# Patient Record
Sex: FEMALE | Race: BLACK OR AFRICAN AMERICAN | NOT HISPANIC OR LATINO | URBAN - METROPOLITAN AREA
[De-identification: names, ages, dates, MRNs, and addresses within clinical notes are randomized per-mention and may not be internally consistent; named-entity substitution may affect disease eponyms.]

---

## 2017-02-23 ENCOUNTER — EMERGENCY (EMERGENCY)
Facility: HOSPITAL | Age: 47
LOS: 1 days | Discharge: HOME | End: 2017-02-23
Attending: EMERGENCY MEDICINE | Admitting: EMERGENCY MEDICINE
Payer: MEDICAID

## 2017-02-23 VITALS
TEMPERATURE: 98 F | SYSTOLIC BLOOD PRESSURE: 110 MMHG | DIASTOLIC BLOOD PRESSURE: 39 MMHG | RESPIRATION RATE: 16 BRPM | OXYGEN SATURATION: 100 % | HEART RATE: 83 BPM

## 2017-02-23 LAB
ALBUMIN SERPL ELPH-MCNC: 3.9 G/DL — SIGNIFICANT CHANGE UP (ref 3.3–5)
ALP SERPL-CCNC: 47 U/L — SIGNIFICANT CHANGE UP (ref 40–120)
ALT FLD-CCNC: 16 U/L — SIGNIFICANT CHANGE UP (ref 4–33)
AST SERPL-CCNC: 24 U/L — SIGNIFICANT CHANGE UP (ref 4–32)
BASOPHILS # BLD AUTO: 0.03 K/UL — SIGNIFICANT CHANGE UP (ref 0–0.2)
BASOPHILS NFR BLD AUTO: 0.4 % — SIGNIFICANT CHANGE UP (ref 0–2)
BILIRUB SERPL-MCNC: 0.4 MG/DL — SIGNIFICANT CHANGE UP (ref 0.2–1.2)
BUN SERPL-MCNC: 6 MG/DL — LOW (ref 7–23)
CALCIUM SERPL-MCNC: 9.5 MG/DL — SIGNIFICANT CHANGE UP (ref 8.4–10.5)
CHLORIDE SERPL-SCNC: 100 MMOL/L — SIGNIFICANT CHANGE UP (ref 98–107)
CO2 SERPL-SCNC: 23 MMOL/L — SIGNIFICANT CHANGE UP (ref 22–31)
CREAT SERPL-MCNC: 0.82 MG/DL — SIGNIFICANT CHANGE UP (ref 0.5–1.3)
EOSINOPHIL # BLD AUTO: 0.54 K/UL — HIGH (ref 0–0.5)
EOSINOPHIL NFR BLD AUTO: 6.6 % — HIGH (ref 0–6)
GLUCOSE SERPL-MCNC: 95 MG/DL — SIGNIFICANT CHANGE UP (ref 70–99)
HCT VFR BLD CALC: 35.7 % — SIGNIFICANT CHANGE UP (ref 34.5–45)
HGB BLD-MCNC: 11.8 G/DL — SIGNIFICANT CHANGE UP (ref 11.5–15.5)
IMM GRANULOCYTES NFR BLD AUTO: 0.1 % — SIGNIFICANT CHANGE UP (ref 0–1.5)
LYMPHOCYTES # BLD AUTO: 4.17 K/UL — HIGH (ref 1–3.3)
LYMPHOCYTES # BLD AUTO: 50.7 % — HIGH (ref 13–44)
MCHC RBC-ENTMCNC: 30.5 PG — SIGNIFICANT CHANGE UP (ref 27–34)
MCHC RBC-ENTMCNC: 33.1 % — SIGNIFICANT CHANGE UP (ref 32–36)
MCV RBC AUTO: 92.2 FL — SIGNIFICANT CHANGE UP (ref 80–100)
MONOCYTES # BLD AUTO: 0.52 K/UL — SIGNIFICANT CHANGE UP (ref 0–0.9)
MONOCYTES NFR BLD AUTO: 6.3 % — SIGNIFICANT CHANGE UP (ref 2–14)
NEUTROPHILS # BLD AUTO: 2.96 K/UL — SIGNIFICANT CHANGE UP (ref 1.8–7.4)
NEUTROPHILS NFR BLD AUTO: 35.9 % — LOW (ref 43–77)
PLATELET # BLD AUTO: 258 K/UL — SIGNIFICANT CHANGE UP (ref 150–400)
PMV BLD: 10.5 FL — SIGNIFICANT CHANGE UP (ref 7–13)
POTASSIUM SERPL-MCNC: 4.6 MMOL/L — SIGNIFICANT CHANGE UP (ref 3.5–5.3)
POTASSIUM SERPL-SCNC: 4.6 MMOL/L — SIGNIFICANT CHANGE UP (ref 3.5–5.3)
PROT SERPL-MCNC: 7.1 G/DL — SIGNIFICANT CHANGE UP (ref 6–8.3)
RBC # BLD: 3.87 M/UL — SIGNIFICANT CHANGE UP (ref 3.8–5.2)
RBC # FLD: 13.2 % — SIGNIFICANT CHANGE UP (ref 10.3–14.5)
SODIUM SERPL-SCNC: 138 MMOL/L — SIGNIFICANT CHANGE UP (ref 135–145)
WBC # BLD: 8.23 K/UL — SIGNIFICANT CHANGE UP (ref 3.8–10.5)
WBC # FLD AUTO: 8.23 K/UL — SIGNIFICANT CHANGE UP (ref 3.8–10.5)

## 2017-02-23 PROCEDURE — 99284 EMERGENCY DEPT VISIT MOD MDM: CPT | Mod: 25

## 2017-02-23 PROCEDURE — 70450 CT HEAD/BRAIN W/O DYE: CPT | Mod: 26

## 2017-02-23 NOTE — ED ADULT TRIAGE NOTE - CHIEF COMPLAINT QUOTE
pt amb to triage c/o dizziness upon waking yesterday and today, presented to PMD today and was told blood sugar "low" unknown number, on presentation pt has no complaints,  on presentation

## 2017-02-24 LAB
CK MB BLD-MCNC: 1 NG/ML — SIGNIFICANT CHANGE UP (ref 1–4.7)
CK MB BLD-MCNC: 1.05 NG/ML — SIGNIFICANT CHANGE UP (ref 1–4.7)
CK MB BLD-MCNC: SIGNIFICANT CHANGE UP (ref 0–2.5)
CK MB BLD-MCNC: SIGNIFICANT CHANGE UP (ref 0–2.5)
CK SERPL-CCNC: 129 U/L — SIGNIFICANT CHANGE UP (ref 25–170)
CK SERPL-CCNC: 140 U/L — SIGNIFICANT CHANGE UP (ref 25–170)
HBA1C BLD-MCNC: 6.6 % — HIGH (ref 4–5.6)
TROPONIN T SERPL-MCNC: < 0.06 NG/ML — SIGNIFICANT CHANGE UP (ref 0–0.06)

## 2017-02-24 PROCEDURE — 93306 TTE W/DOPPLER COMPLETE: CPT | Mod: 26

## 2017-02-24 PROCEDURE — 70547 MR ANGIOGRAPHY NECK W/O DYE: CPT | Mod: 26

## 2017-02-24 PROCEDURE — 70551 MRI BRAIN STEM W/O DYE: CPT | Mod: 26

## 2017-02-24 PROCEDURE — 99285 EMERGENCY DEPT VISIT HI MDM: CPT | Mod: GC

## 2017-02-24 RX ORDER — ASPIRIN/CALCIUM CARB/MAGNESIUM 324 MG
324 TABLET ORAL ONCE
Qty: 0 | Refills: 0 | Status: COMPLETED | OUTPATIENT
Start: 2017-02-24 | End: 2017-02-24

## 2017-02-24 RX ORDER — LANOLIN ALCOHOL/MO/W.PET/CERES
3 CREAM (GRAM) TOPICAL ONCE
Qty: 0 | Refills: 0 | Status: COMPLETED | OUTPATIENT
Start: 2017-02-24 | End: 2017-02-24

## 2017-02-24 RX ORDER — ATORVASTATIN CALCIUM 80 MG/1
20 TABLET, FILM COATED ORAL AT BEDTIME
Qty: 0 | Refills: 0 | Status: DISCONTINUED | OUTPATIENT
Start: 2017-02-24 | End: 2017-02-27

## 2017-02-24 RX ORDER — SODIUM CHLORIDE 9 MG/ML
1000 INJECTION INTRAMUSCULAR; INTRAVENOUS; SUBCUTANEOUS ONCE
Qty: 0 | Refills: 0 | Status: COMPLETED | OUTPATIENT
Start: 2017-02-24 | End: 2017-02-24

## 2017-02-24 RX ORDER — METFORMIN HYDROCHLORIDE 850 MG/1
500 TABLET ORAL DAILY
Qty: 0 | Refills: 0 | Status: DISCONTINUED | OUTPATIENT
Start: 2017-02-24 | End: 2017-02-27

## 2017-02-24 RX ORDER — ASPIRIN/CALCIUM CARB/MAGNESIUM 324 MG
325 TABLET ORAL DAILY
Qty: 0 | Refills: 0 | Status: DISCONTINUED | OUTPATIENT
Start: 2017-02-24 | End: 2017-02-24

## 2017-02-24 RX ORDER — SODIUM CHLORIDE 9 MG/ML
1000 INJECTION INTRAMUSCULAR; INTRAVENOUS; SUBCUTANEOUS
Qty: 0 | Refills: 0 | Status: DISCONTINUED | OUTPATIENT
Start: 2017-02-24 | End: 2017-02-27

## 2017-02-24 RX ORDER — ACETAMINOPHEN 500 MG
650 TABLET ORAL ONCE
Qty: 0 | Refills: 0 | Status: COMPLETED | OUTPATIENT
Start: 2017-02-24 | End: 2017-02-24

## 2017-02-24 RX ADMIN — SODIUM CHLORIDE 1000 MILLILITER(S): 9 INJECTION INTRAMUSCULAR; INTRAVENOUS; SUBCUTANEOUS at 12:21

## 2017-02-24 RX ADMIN — Medication 3 MILLIGRAM(S): at 03:40

## 2017-02-24 RX ADMIN — Medication 650 MILLIGRAM(S): at 19:26

## 2017-02-24 RX ADMIN — SODIUM CHLORIDE 125 MILLILITER(S): 9 INJECTION INTRAMUSCULAR; INTRAVENOUS; SUBCUTANEOUS at 03:39

## 2017-02-24 RX ADMIN — SODIUM CHLORIDE 125 MILLILITER(S): 9 INJECTION INTRAMUSCULAR; INTRAVENOUS; SUBCUTANEOUS at 10:58

## 2017-02-24 RX ADMIN — Medication 650 MILLIGRAM(S): at 18:56

## 2017-02-24 RX ADMIN — Medication 324 MILLIGRAM(S): at 03:39

## 2017-02-24 RX ADMIN — METFORMIN HYDROCHLORIDE 500 MILLIGRAM(S): 850 TABLET ORAL at 12:21

## 2017-02-24 RX ADMIN — ATORVASTATIN CALCIUM 20 MILLIGRAM(S): 80 TABLET, FILM COATED ORAL at 23:28

## 2017-02-24 NOTE — ED PROVIDER NOTE - OBJECTIVE STATEMENT
pt c/o dizziness earlier today  no associated GI sxs no SOB  has chronic sternal chest pain worse with breathing or movement    Pt denies change in medications or diet  She denies fever abd pain or dysuria  She was seen by her PMD and told her glucose was low (on metformin only) and that her blood pressure was in the 80s  She was given something to eat and told to go to the hospital   Pt also felt left leg was heavy

## 2017-02-24 NOTE — ED CDU PROVIDER NOTE - CARDIAC, MLM
Normal rate, regular rhythm.  Heart sounds S1, S2.  No murmurs, rubs or gallops, no LE edema, mild TTP over sternum.

## 2017-02-24 NOTE — ED PROVIDER NOTE - MEDICAL DECISION MAKING DETAILS
pt with dizziness and reported hypotension  physical w/o orthostasis  no suspicious murmur  no focal finding on neuro   labs unremarkable    EKG  no acute ischemic changes  isolated small R in V3 likely due to lead placement   Because of complaints of leg heaviness  CT performed  negative  Plan place in CDU  serial enzymes  monitor  obtain ECHO

## 2017-02-24 NOTE — ED CDU PROVIDER NOTE - CHPI ED SYMPTOMS NEG
no nausea/no fever/no chills/no diaphoresis/no shortness of breath/no vomiting/no back pain/no syncope

## 2017-02-24 NOTE — ED CDU PROVIDER NOTE - ATTENDING CONTRIBUTION TO CARE
Dr. James Dr. James attending  Pt placed in CDU for monitoring and ECHO  Presented with dizziness and was told by PMD her sugar was low (on only metformin) and hwer BP was in the 80s   Pt was w/o sxs of fever SOB or GI volume loss  She notes chronic sternal pain with upper body movement    In ED had normal BP  no orthostatic changes  benign abd  no significant murmur,  nonfocal neuro unremarkable labs and nonischemic EKG

## 2017-02-24 NOTE — ED CDU PROVIDER NOTE - MEDICAL DECISION MAKING DETAILS
Pt. was evaluated in the ED and sent to CDU for cardiac telemetry monitoring, IV hydration, CE #2 and #3, echo, observation and reassessment.

## 2017-02-24 NOTE — ED CDU PROVIDER NOTE - PROGRESS NOTE DETAILS
CDU HANNAH Ruiz - Patient states she still feels lightheaded slightly when she stands. No other complaints this am. Exam: heart- RRR s1s2 nl Lungs - clear bilaterally  abd - soft NT ND extrem- no edema or cyanosis, Cardiac enzyme #1 trop negative  #2 trop negative. Plan for third cardiac enzyme this am. Echocardiogram to be done today. Continue to monitor on telemetry. CDU HANNAH Zhang - patient seen by neurology team this am. patient still with complaints of dizziness. neurology recommending MRI MRA for further evaluation. Continue to monitor. CDU HANNAH Zhang - Patient states she still feels lightheaded slightly when she stands. No other complaints this am. Exam: heart- RRR s1s2 nl Lungs - clear bilaterally  abd - soft NT ND extrem- no edema or cyanosis, Cardiac enzyme #1 trop negative  #2 trop negative. Plan for third cardiac enzyme this am. Echocardiogram to be done today. Continue to monitor on telemetry. CDU Attending Note: Dr. Dean - Pt states headache on left side of head has improved.  Occipital pain is resolved.  Describes it as heaviness, no numbness, no tingling, no weakness.  On exam, well appearing, stable vitals, no distress, clear lungs, normal cardiac, soft non tender abd, unremarkable neuro exam. Awaiting MRI and echo results. CDU HANNAH Marshall Addendum------  This patient was signed out to me by CDU HANNAH Flynn and CDU attending Dr. Dean on 02/24/17 at 1900 hrs; test results reviewed.  In interim, pt has been resting comfortably; no complaints in interim.  MRI/MRA pending completion.  Pt stable at present; will continue to monitor / reassess. DEBORA Marshall Addendum----  Pt. resting comfortably in interim; no issues to date; will continue to monitor / reassess. CDU HANNAH Arvizu: MRI results reviewed. Neuro attending seen pt at bedside. Pt stable for discharge and to follow up with neuro outpt at . CDU DC note Dahlia: Pt. stable, MR brain with contrast shows no concerning sx, pt. has mild HA and cont. to c/o L thigh "heaviness" which is worse from baseline LLE c/o following CVA. ERYN, EOMI, no photophobia, neck supple, full ROM, NT spine, neg SLR, full active ROM, good distal pulses. Will FU Neuro outpt.

## 2017-02-24 NOTE — ED CDU PROVIDER NOTE - OBJECTIVE STATEMENT
48 y/o F PMH DM (metformin) and HLD c/o orthostatic lightheadedness (resolves within seconds), sternal nonradiating CP and fatigue since yesterday. Pt states the CP is pleuritic and worse with exertion but is always there. Also notes left calf "tightness". Denies fever, chills, SOB, cough, abdominal pain, N/V, diaphoresis, dysuria, numbness/tingling/wekaness of extremities, recent travel, h/o DVT/PE. 48 y/o F PMH DM (metformin) and HLD c/o orthostatic lightheadedness (resolves within seconds), sternal nonradiating CP and fatigue since yesterday. Pt states the CP is pleuritic and worse with exertion but is always there. Also notes left calf "tightness". Denies fever, chills, SOB, cough, abdominal pain, N/V, diaphoresis, dysuria, numbness/tingling/wekaness/swelling of extremities, recent travel, h/o DVT/PE. Pt went to PMD office today (Brenna) who sent her to ED for "low BP and low sugar" per pt.

## 2017-02-24 NOTE — ED PROVIDER NOTE - PHYSICAL EXAMINATION
pt was not orthostatic  lying to standing  systolic went from 109  to  116  pt not tachycardic  central chest pain reproduced with upper body movement

## 2017-02-25 VITALS
RESPIRATION RATE: 14 BRPM | DIASTOLIC BLOOD PRESSURE: 76 MMHG | OXYGEN SATURATION: 100 % | HEART RATE: 97 BPM | TEMPERATURE: 98 F | SYSTOLIC BLOOD PRESSURE: 109 MMHG

## 2017-02-25 PROCEDURE — 99217: CPT

## 2017-02-25 RX ORDER — ACETAMINOPHEN 500 MG
650 TABLET ORAL ONCE
Qty: 0 | Refills: 0 | Status: COMPLETED | OUTPATIENT
Start: 2017-02-25 | End: 2017-02-25

## 2017-02-25 RX ADMIN — METFORMIN HYDROCHLORIDE 500 MILLIGRAM(S): 850 TABLET ORAL at 08:59

## 2017-02-25 RX ADMIN — Medication 650 MILLIGRAM(S): at 12:43

## 2017-03-24 PROCEDURE — 99218: CPT

## 2017-04-17 ENCOUNTER — EMERGENCY (EMERGENCY)
Facility: HOSPITAL | Age: 47
LOS: 1 days | Discharge: HOME | End: 2017-04-17
Attending: EMERGENCY MEDICINE | Admitting: EMERGENCY MEDICINE
Payer: MEDICAID

## 2017-04-17 VITALS
SYSTOLIC BLOOD PRESSURE: 117 MMHG | TEMPERATURE: 98 F | DIASTOLIC BLOOD PRESSURE: 79 MMHG | HEART RATE: 80 BPM | RESPIRATION RATE: 17 BRPM | OXYGEN SATURATION: 99 %

## 2017-04-17 PROCEDURE — 99284 EMERGENCY DEPT VISIT MOD MDM: CPT

## 2017-04-17 PROCEDURE — 93971 EXTREMITY STUDY: CPT | Mod: 26,LT

## 2017-04-17 RX ORDER — GABAPENTIN 400 MG/1
400 CAPSULE ORAL ONCE
Qty: 0 | Refills: 0 | Status: COMPLETED | OUTPATIENT
Start: 2017-04-17 | End: 2017-04-17

## 2017-04-17 RX ORDER — GABAPENTIN 400 MG/1
1 CAPSULE ORAL
Qty: 90 | Refills: 0 | OUTPATIENT
Start: 2017-04-17 | End: 2017-05-17

## 2017-04-17 RX ADMIN — GABAPENTIN 400 MILLIGRAM(S): 400 CAPSULE ORAL at 15:05

## 2017-04-17 NOTE — ED PROVIDER NOTE - PLAN OF CARE
Follow up with your PMD within 48-72 hrs. Show copies of your reports given to you. Take all of your medications as previously prescribed. Take Neurontin 300mg 1 tab 3x/day. You need to have tight control of you diabetes and aim to keep your HGA1C under 7- discuss further with your PMD . Follow up with a Neurologist within the week- referral list given. Worsening, continued or new concerning symptoms return to the emergency department.

## 2017-04-17 NOTE — ED PROVIDER NOTE - LOWER EXTREMITY EXAM, LEFT
SWELLING/TENDERNESS/Slight swelling to left upper leg. No calf tenderness. Pain to plantar surface of big toe.

## 2017-04-17 NOTE — ED PROVIDER NOTE - NS ED MD SCRIBE ATTENDING SCRIBE SECTIONS
HIV/PAST MEDICAL/SURGICAL/SOCIAL HISTORY/PHYSICAL EXAM/REVIEW OF SYSTEMS/VITAL SIGNS( Pullset)/DISPOSITION/HISTORY OF PRESENT ILLNESS

## 2017-04-17 NOTE — ED PROVIDER NOTE - PROGRESS NOTE DETAILS
RAHUL YOUNG TIBERIO- Duplex US negative for acute DVT. Neurontin started. Will DC home with PMD and Neuro follow up RAHUL WILLARD- Duplex US negative for acute DVT. Neurontin started. Will DC home with PMD and Neuro follow up- has a Neurologist she is following Dr. Mg.

## 2017-04-17 NOTE — ED ADULT TRIAGE NOTE - CHIEF COMPLAINT QUOTE
pt c/o of generalized body cramping x 2 weeks worsening today with left thigh swelling and left toe pain. appears comfortable in triage, ambulating with steady gait.

## 2017-04-17 NOTE — ED PROVIDER NOTE - OBJECTIVE STATEMENT
46 y/o F pt with PMHx of HLD, DM (on Metformin) and herniated disc presents to the ED for diffuse body aches, left leg swelling and pain in toes described as burning x2 weeks. Patient is followed by pain management and gets routine epidural injections. States taking medication for pain with no relief of symptoms. No prolonged immobilization. Denies fall. 48 y/o F pt with PMHx of HLD, DM (on Metformin) and herniated disc presents to the ED for diffuse body aches, left leg pain and swelling and pain in 1st toe described as burning x2 weeks. Patient is followed by pain management and gets routine epidural injections. States taking medication for pain with no relief of symptoms. No prolonged immobilization. Denies fall.

## 2017-04-17 NOTE — ED PROVIDER NOTE - CARE PLAN
Principal Discharge DX:	Diabetic neuropathy  Instructions for follow-up, activity and diet:	Follow up with your PMD within 48-72 hrs. Show copies of your reports given to you. Take all of your medications as previously prescribed. Take Neurontin 300mg 1 tab 3x/day. You need to have tight control of you diabetes and aim to keep your HGA1C under 7- discuss further with your PMD . Follow up with a Neurologist within the week- referral list given. Worsening, continued or new concerning symptoms return to the emergency department.

## 2017-04-17 NOTE — ED PROVIDER NOTE - MEDICAL DECISION MAKING DETAILS
Patient with chronic history of herniated disc with sciatica. Will obtain US to r/o atypical DVT. Description of toe pain appears very much likely peripheral neuropathy. Will provide Gabapentin. Patient has follow up with orthopedics and pain management.

## 2017-04-17 NOTE — ED PROVIDER NOTE - MUSCULOSKELETAL [+], MLM
diffuse body aches, left leg swelling, pain in toes diffuse body aches, left leg pain and swelling and pain in 1st toe

## 2017-04-19 RX ORDER — GABAPENTIN 400 MG/1
1 CAPSULE ORAL
Qty: 90 | Refills: 0 | OUTPATIENT
Start: 2017-04-19 | End: 2017-05-19

## 2017-10-06 ENCOUNTER — EMERGENCY (EMERGENCY)
Facility: HOSPITAL | Age: 47
LOS: 1 days | Discharge: HOME | End: 2017-10-06
Attending: EMERGENCY MEDICINE | Admitting: EMERGENCY MEDICINE
Payer: MEDICAID

## 2017-10-06 VITALS
RESPIRATION RATE: 18 BRPM | TEMPERATURE: 98 F | OXYGEN SATURATION: 100 % | SYSTOLIC BLOOD PRESSURE: 115 MMHG | HEART RATE: 83 BPM | DIASTOLIC BLOOD PRESSURE: 73 MMHG

## 2017-10-06 PROCEDURE — 99284 EMERGENCY DEPT VISIT MOD MDM: CPT | Mod: 25

## 2017-10-06 NOTE — ED ADULT TRIAGE NOTE - CHIEF COMPLAINT QUOTE
Pt c/o generalize back pain and swelling x 2 weeks that is getting worse. Pt has chronic back pain. Pt states that the left side of her back feels "freezing". Pt denies numbness in  b/l LE. Pt ambulating in triage with steady gait. PMH DM, HDL.

## 2017-10-07 VITALS
HEART RATE: 82 BPM | OXYGEN SATURATION: 100 % | SYSTOLIC BLOOD PRESSURE: 124 MMHG | DIASTOLIC BLOOD PRESSURE: 81 MMHG | RESPIRATION RATE: 16 BRPM

## 2017-10-07 DIAGNOSIS — R22.30 LOCALIZED SWELLING, MASS AND LUMP, UNSPECIFIED UPPER LIMB: Chronic | ICD-10-CM

## 2017-10-07 RX ORDER — DIAZEPAM 5 MG
1 TABLET ORAL
Qty: 6 | Refills: 0 | OUTPATIENT
Start: 2017-10-07

## 2017-10-07 RX ORDER — ACETAMINOPHEN 500 MG
325 TABLET ORAL ONCE
Qty: 0 | Refills: 0 | Status: DISCONTINUED | OUTPATIENT
Start: 2017-10-07 | End: 2017-10-07

## 2017-10-07 RX ORDER — ACETAMINOPHEN 500 MG
975 TABLET ORAL ONCE
Qty: 0 | Refills: 0 | Status: COMPLETED | OUTPATIENT
Start: 2017-10-07 | End: 2017-10-07

## 2017-10-07 RX ORDER — LIDOCAINE 4 G/100G
2 CREAM TOPICAL ONCE
Qty: 0 | Refills: 0 | Status: COMPLETED | OUTPATIENT
Start: 2017-10-07 | End: 2017-10-07

## 2017-10-07 RX ORDER — KETOROLAC TROMETHAMINE 30 MG/ML
30 SYRINGE (ML) INJECTION ONCE
Qty: 0 | Refills: 0 | Status: DISCONTINUED | OUTPATIENT
Start: 2017-10-07 | End: 2017-10-07

## 2017-10-07 RX ADMIN — Medication 30 MILLIGRAM(S): at 03:37

## 2017-10-07 RX ADMIN — LIDOCAINE 2 PATCH: 4 CREAM TOPICAL at 03:37

## 2017-10-07 RX ADMIN — Medication 975 MILLIGRAM(S): at 06:37

## 2017-10-07 RX ADMIN — Medication 30 MILLIGRAM(S): at 04:55

## 2017-10-07 NOTE — ED PROVIDER NOTE - NEUROLOGICAL SENSATION DIMINISHED
left upper extremity/left lower extremity left lower extremity/L3-S1 diminished on left compared to right/left upper extremity

## 2017-10-07 NOTE — ED PROVIDER NOTE - MEDICAL DECISION MAKING DETAILS
48 yo F c PMH of CVA last year and disc herniation presenting with 2 weeks of L arm and L leg pain and numbness, as well as back pain and swelling. VS are normal. Pt has point 48 yo F c PMH of CVA last year and disc herniation presenting with 2 weeks of L arm and L leg pain and numbness, as well as back pain and swelling. VS are normal. Pt has point tenderness diffusely throughout back, no marked C/T/L spinous process tenderness. Will tx with lidocaine patch, valium, and toradol, will reassess. 48 yo F with worsening right back pain.  No trauma, neuro intact.  Plan to pain control, reassess, outpatient spine follow-up.

## 2017-10-07 NOTE — ED PROVIDER NOTE - RESPIRATORY, MLM
Breath sounds clear and equal bilaterally. Breath sounds clear and equal bilaterally. no wheezes or ronchi

## 2017-10-07 NOTE — ED PROVIDER NOTE - MUSCULOSKELETAL MINIMAL EXAM
point TTP diffusely throughout back/motor intact/normal range of motion/neck supple point TTP diffusely throughout back, no marked C/T/L midline spinous process TTP; no ecchymosis, lacerations, or erythema/normal range of motion/neck supple/motor intact

## 2017-10-07 NOTE — ED PROVIDER NOTE - OBJECTIVE STATEMENT
48 yo F c PMH of HLD, DM, CVA in 2016 presenting with 2 week hx of L arm and L leg numbness and pain.  Pt had L arm numbness for 1 week with stroke last year, had no numbness in L arm since then until 2 weeks ago. Pt reports sharp pain in L arm and L leg that occurs at night and when pt wakes up, pt denies trauma. Pt reports L arm and L leg numbness that is constant for 2 weeks. Pt denies falls or trauma. Naproxen does not help alleviate the pain. Pt also reports associated diffuse back swelling and pain for 1 week. PCP today told pt to go to ED.    Meds: atoravastatin, metformin  PCP: Nayeli Ceja  Pharm: [facility] 46 yo F c PMH of HLD, DM, CVA in 2016 presenting with 2 week hx of L arm and L leg numbness and pain.  Pt had L arm numbness for 1 week with stroke last year, had no numbness in L arm since then until 2 weeks ago. Pt reports sharp pain in L arm and L leg that occurs at night and when pt wakes up, pt denies trauma. Pt reports L arm and L leg numbness that is constant for 2 weeks. Pt denies falls or trauma. Naproxen does not help alleviate the pain. Pt also reports associated diffuse back swelling and pain for 1 week. PCP today told pt to go to ED. Pt denies saddle anesthesia or bowel or bladder incontinence.    Meds: atoravastatin, metformin  PCP: Nayeli Ceja  Pharm: [facility] **ATTENDIN46 y/o F with PMH of HLD, DM, chronic back pain presenting with 2 week hx of R back pain/swelling.  Pt has h/o chronic back pain (was previously seeing a spine specialist and receiving monthly injections, but has not seen this doctor in 6 months), but reports that it has been worse in the past 2 weeks, with associated sensation of swelling of her back.  Pt denies falls or trauma.  Pt has been taking naproxen with minimal relief.  She also was prescribed oxycodone for pain, which she does not like to take because it makes her feel tired.  PCP today told pt to go to ED. Pt denies saddle anesthesia or bowel or bladder incontinence, extremity weakness or numbness.    Meds: atoravastatin, metformin  PCP: Nayeli Ceja  Pharm: [facility]

## 2017-10-07 NOTE — ED PROVIDER NOTE - CARE PLAN
Principal Discharge DX:	Pain Principal Discharge DX:	Pain  Instructions for follow-up, activity and diet:	1. You were seen for back pain. Your back pain improved with pain medication, a lidocaine patch, and warm compresses.  2.  the prescription for diazepam, toradol, and lidocaine patch from your pharmacy and take and use as prescribed.  3. Follow up with your primary care doctor within 48 hours. Follow up with a spinal surgeon within 48 hours.  4. Return to the emergency department immediately if you cannot control your urination or stool, if you have numbness or tingling between your thighs, if you cannot walk, if you have persistent or worsening pain, if you faint, or if you have a fever. Principal Discharge DX:	Pain  Instructions for follow-up, activity and diet:	1. You were seen for back pain. Your back pain improved with pain medication, a lidocaine patch, and warm compresses.  2.  the prescription for diazepam, toradol, and lidocaine patch from your pharmacy and take and use as prescribed.  3. Follow up with your primary care doctor within 48 hours. Follow up with a spinal surgeon within 48 hours. Call [phone] to make an appointment with a spine specialist.  4. Return to the emergency department immediately if you cannot control your urination or stool, if you have numbness or tingling between your thighs, if you cannot walk, if you have persistent or worsening pain, if you faint, or if you have a fever. Principal Discharge DX:	Back pain  Instructions for follow-up, activity and diet:	1. You were seen for back pain. Your back pain improved with pain medication, a lidocaine patch, and warm compresses.  2.  the prescription for diazepam, toradol, and lidocaine patch from your pharmacy and take and use as prescribed.  3. Follow up with your primary care doctor within 48 hours. Follow up with a spinal surgeon within 48 hours. Call [phone] to make an appointment with a spine specialist.  4. Return to the emergency department immediately if you cannot control your urination or stool, if you have numbness or tingling between your thighs, if you cannot walk, if you have persistent or worsening pain, if you faint, or if you have a fever.

## 2017-10-07 NOTE — ED PROVIDER NOTE - PMH
CVA (cerebral vascular accident)    Diabetes    Disc disorder  herniated  Hyperlipidemia, unspecified hyperlipidemia type

## 2017-10-07 NOTE — ED PROVIDER NOTE - PLAN OF CARE
1. You were seen for back pain. Your back pain improved with pain medication, a lidocaine patch, and warm compresses.  2.  the prescription for diazepam, toradol, and lidocaine patch from your pharmacy and take and use as prescribed.  3. Follow up with your primary care doctor within 48 hours. Follow up with a spinal surgeon within 48 hours.  4. Return to the emergency department immediately if you cannot control your urination or stool, if you have numbness or tingling between your thighs, if you cannot walk, if you have persistent or worsening pain, if you faint, or if you have a fever. 1. You were seen for back pain. Your back pain improved with pain medication, a lidocaine patch, and warm compresses.  2.  the prescription for diazepam, toradol, and lidocaine patch from your pharmacy and take and use as prescribed.  3. Follow up with your primary care doctor within 48 hours. Follow up with a spinal surgeon within 48 hours. Call [phone] to make an appointment with a spine specialist.  4. Return to the emergency department immediately if you cannot control your urination or stool, if you have numbness or tingling between your thighs, if you cannot walk, if you have persistent or worsening pain, if you faint, or if you have a fever.

## 2017-10-07 NOTE — ED PROVIDER NOTE - NEUROLOGICAL GAIT WEIGHT BEARING
5/5 BUE and BLE: deltoids, triceps, biceps, wrist flexors/extensors, hand  strength, hip flexors, knee flexors/extensors, dorsi/plantarflexors, hallux flexors/extensors normal/5/5 BUE and BLE: deltoids, triceps, biceps, wrist flexors/extensors, hand  strength, hip flexors, knee flexors/extensors, dorsi/plantarflexors, hallux flexors/extensors

## 2017-10-07 NOTE — ED PROVIDER NOTE - ATTENDING CONTRIBUTION TO CARE
48 y/o F with h/o hyperlipidemia, dm, chronic back pain here with 2 weeks of right sided diffuse back pain.  Pt also reports sensation of swelling to her back.  No preceding trauma.  No weakness, numbness, bladder or caitlyn incontinence, saddle anesthesia.  Pt reports h/o chronic back pain, take naproxyn with minimal relief.  Has not seen her spine doctor in 6 months (was previously getting injections for pain).  Well appearing, lying comfortably in stretcher, awake and alert, nontoxic.  VSS.  Lungs cta bl.  Cards nl S1/S2, RRR, no MRG.  Abd soft ntnd.  No pedal edema or calf tenderness.  No midline spinal tenderness.  Diffuse right paraspinal muscular tenderness to palpation from neck to low back.  No focal neuro deficits, strength is full and gait is normal.  Plan to pain control, reassess.

## 2017-10-07 NOTE — ED PROVIDER NOTE - PROGRESS NOTE DETAILS
pt states her back pain has improved s/p diazepam, toradol, lidocaine patch, and warm compress. will d/c with spinal surgery follow up

## 2017-10-07 NOTE — ED PROVIDER NOTE - FAMILY HISTORY
Mother  Still living? Unknown  Family history of breast cancer, Age at diagnosis: Age Unknown  Family history of diabetes mellitus (DM), Age at diagnosis: Age Unknown     Father  Still living? Unknown  Family history of diabetes mellitus (DM), Age at diagnosis: Age Unknown  Family history of hypertension, Age at diagnosis: Age Unknown

## 2017-10-07 NOTE — ED ADULT NURSE NOTE - OBJECTIVE STATEMENT
pt c/o left sided arm and leg pain/numbness, pt has sensation in all extremities and full ROM- pt is a/ox3, ambulatory at baseline, vitally stable in NAD pt has hx of stroke last year with no residual weakness. MD at bedside, awaiting further orders from MD, will continue to monitor, pt safety maintained. pt has no other medical complaints at this time,

## 2017-12-01 ENCOUNTER — OUTPATIENT (OUTPATIENT)
Dept: OUTPATIENT SERVICES | Facility: HOSPITAL | Age: 47
LOS: 1 days | End: 2017-12-01
Payer: MEDICAID

## 2017-12-01 DIAGNOSIS — R22.30 LOCALIZED SWELLING, MASS AND LUMP, UNSPECIFIED UPPER LIMB: Chronic | ICD-10-CM

## 2017-12-01 PROCEDURE — G9001: CPT

## 2017-12-26 ENCOUNTER — EMERGENCY (EMERGENCY)
Facility: HOSPITAL | Age: 47
LOS: 1 days | Discharge: HOME | End: 2017-12-26
Attending: EMERGENCY MEDICINE | Admitting: EMERGENCY MEDICINE
Payer: MEDICAID

## 2017-12-26 VITALS
HEART RATE: 86 BPM | TEMPERATURE: 99 F | DIASTOLIC BLOOD PRESSURE: 86 MMHG | RESPIRATION RATE: 16 BRPM | SYSTOLIC BLOOD PRESSURE: 125 MMHG | OXYGEN SATURATION: 100 %

## 2017-12-26 DIAGNOSIS — R22.30 LOCALIZED SWELLING, MASS AND LUMP, UNSPECIFIED UPPER LIMB: Chronic | ICD-10-CM

## 2017-12-26 LAB
ALBUMIN SERPL ELPH-MCNC: 4.4 G/DL — SIGNIFICANT CHANGE UP (ref 3.3–5)
ALP SERPL-CCNC: 44 U/L — SIGNIFICANT CHANGE UP (ref 40–120)
ALT FLD-CCNC: 20 U/L — SIGNIFICANT CHANGE UP (ref 4–33)
AST SERPL-CCNC: 36 U/L — HIGH (ref 4–32)
BASOPHILS # BLD AUTO: 0.07 K/UL — SIGNIFICANT CHANGE UP (ref 0–0.2)
BASOPHILS NFR BLD AUTO: 1.1 % — SIGNIFICANT CHANGE UP (ref 0–2)
BILIRUB SERPL-MCNC: < 0.2 MG/DL — LOW (ref 0.2–1.2)
BUN SERPL-MCNC: 6 MG/DL — LOW (ref 7–23)
CALCIUM SERPL-MCNC: 9 MG/DL — SIGNIFICANT CHANGE UP (ref 8.4–10.5)
CHLORIDE SERPL-SCNC: 101 MMOL/L — SIGNIFICANT CHANGE UP (ref 98–107)
CK MB BLD-MCNC: 0.8 — SIGNIFICANT CHANGE UP (ref 0–2.5)
CK MB BLD-MCNC: 2.14 NG/ML — SIGNIFICANT CHANGE UP (ref 1–4.7)
CK SERPL-CCNC: 278 U/L — HIGH (ref 25–170)
CO2 SERPL-SCNC: 26 MMOL/L — SIGNIFICANT CHANGE UP (ref 22–31)
CREAT SERPL-MCNC: 0.93 MG/DL — SIGNIFICANT CHANGE UP (ref 0.5–1.3)
EOSINOPHIL # BLD AUTO: 0.63 K/UL — HIGH (ref 0–0.5)
EOSINOPHIL NFR BLD AUTO: 9.9 % — HIGH (ref 0–6)
GLUCOSE SERPL-MCNC: 118 MG/DL — HIGH (ref 70–99)
HBA1C BLD-MCNC: 6.8 % — HIGH (ref 4–5.6)
HCT VFR BLD CALC: 36.1 % — SIGNIFICANT CHANGE UP (ref 34.5–45)
HGB BLD-MCNC: 12.2 G/DL — SIGNIFICANT CHANGE UP (ref 11.5–15.5)
IMM GRANULOCYTES # BLD AUTO: 0.01 # — SIGNIFICANT CHANGE UP
IMM GRANULOCYTES NFR BLD AUTO: 0.2 % — SIGNIFICANT CHANGE UP (ref 0–1.5)
LYMPHOCYTES # BLD AUTO: 3.73 K/UL — HIGH (ref 1–3.3)
LYMPHOCYTES # BLD AUTO: 58.7 % — HIGH (ref 13–44)
MCHC RBC-ENTMCNC: 30.1 PG — SIGNIFICANT CHANGE UP (ref 27–34)
MCHC RBC-ENTMCNC: 33.8 % — SIGNIFICANT CHANGE UP (ref 32–36)
MCV RBC AUTO: 89.1 FL — SIGNIFICANT CHANGE UP (ref 80–100)
MONOCYTES # BLD AUTO: 0.47 K/UL — SIGNIFICANT CHANGE UP (ref 0–0.9)
MONOCYTES NFR BLD AUTO: 7.4 % — SIGNIFICANT CHANGE UP (ref 2–14)
NEUTROPHILS # BLD AUTO: 1.44 K/UL — LOW (ref 1.8–7.4)
NEUTROPHILS NFR BLD AUTO: 22.7 % — LOW (ref 43–77)
NRBC # FLD: 0 — SIGNIFICANT CHANGE UP
NT-PROBNP SERPL-SCNC: 9.6 PG/ML — SIGNIFICANT CHANGE UP
PLATELET # BLD AUTO: 273 K/UL — SIGNIFICANT CHANGE UP (ref 150–400)
PMV BLD: 9.9 FL — SIGNIFICANT CHANGE UP (ref 7–13)
POTASSIUM SERPL-MCNC: 5.1 MMOL/L — SIGNIFICANT CHANGE UP (ref 3.5–5.3)
POTASSIUM SERPL-SCNC: 5.1 MMOL/L — SIGNIFICANT CHANGE UP (ref 3.5–5.3)
PROT SERPL-MCNC: 7.7 G/DL — SIGNIFICANT CHANGE UP (ref 6–8.3)
RBC # BLD: 4.05 M/UL — SIGNIFICANT CHANGE UP (ref 3.8–5.2)
RBC # FLD: 13.5 % — SIGNIFICANT CHANGE UP (ref 10.3–14.5)
SODIUM SERPL-SCNC: 138 MMOL/L — SIGNIFICANT CHANGE UP (ref 135–145)
TROPONIN T SERPL-MCNC: < 0.06 NG/ML — SIGNIFICANT CHANGE UP (ref 0–0.06)
WBC # BLD: 6.35 K/UL — SIGNIFICANT CHANGE UP (ref 3.8–10.5)
WBC # FLD AUTO: 6.35 K/UL — SIGNIFICANT CHANGE UP (ref 3.8–10.5)

## 2017-12-26 PROCEDURE — 93010 ELECTROCARDIOGRAM REPORT: CPT | Mod: 59

## 2017-12-26 PROCEDURE — 93971 EXTREMITY STUDY: CPT | Mod: 26,LT

## 2017-12-26 PROCEDURE — 99219: CPT | Mod: 25

## 2017-12-26 PROCEDURE — 71020: CPT | Mod: 26

## 2017-12-26 RX ORDER — DIPHENHYDRAMINE HCL 50 MG
50 CAPSULE ORAL ONCE
Qty: 0 | Refills: 0 | Status: COMPLETED | OUTPATIENT
Start: 2017-12-26 | End: 2017-12-26

## 2017-12-26 RX ORDER — ATORVASTATIN CALCIUM 80 MG/1
1 TABLET, FILM COATED ORAL
Qty: 0 | Refills: 0 | COMMUNITY

## 2017-12-26 RX ORDER — ATORVASTATIN CALCIUM 80 MG/1
40 TABLET, FILM COATED ORAL AT BEDTIME
Qty: 0 | Refills: 0 | Status: DISCONTINUED | OUTPATIENT
Start: 2017-12-26 | End: 2017-12-30

## 2017-12-26 RX ORDER — OXYCODONE AND ACETAMINOPHEN 5; 325 MG/1; MG/1
1 TABLET ORAL ONCE
Qty: 0 | Refills: 0 | Status: DISCONTINUED | OUTPATIENT
Start: 2017-12-26 | End: 2017-12-26

## 2017-12-26 RX ORDER — ASPIRIN/CALCIUM CARB/MAGNESIUM 324 MG
81 TABLET ORAL DAILY
Qty: 0 | Refills: 0 | Status: DISCONTINUED | OUTPATIENT
Start: 2017-12-27 | End: 2017-12-30

## 2017-12-26 RX ORDER — ASPIRIN/CALCIUM CARB/MAGNESIUM 324 MG
1 TABLET ORAL
Qty: 0 | Refills: 0 | COMMUNITY

## 2017-12-26 RX ORDER — ASPIRIN/CALCIUM CARB/MAGNESIUM 324 MG
325 TABLET ORAL ONCE
Qty: 0 | Refills: 0 | Status: COMPLETED | OUTPATIENT
Start: 2017-12-26 | End: 2017-12-26

## 2017-12-26 RX ORDER — ACETAMINOPHEN 500 MG
0 TABLET ORAL
Qty: 0 | Refills: 0 | COMMUNITY

## 2017-12-26 RX ORDER — KETOROLAC TROMETHAMINE 30 MG/ML
30 SYRINGE (ML) INJECTION ONCE
Qty: 0 | Refills: 0 | Status: DISCONTINUED | OUTPATIENT
Start: 2017-12-26 | End: 2017-12-26

## 2017-12-26 RX ADMIN — Medication 50 MILLIGRAM(S): at 21:04

## 2017-12-26 RX ADMIN — Medication 325 MILLIGRAM(S): at 18:39

## 2017-12-26 RX ADMIN — Medication 30 MILLIGRAM(S): at 21:19

## 2017-12-26 RX ADMIN — Medication 30 MILLIGRAM(S): at 21:04

## 2017-12-26 RX ADMIN — ATORVASTATIN CALCIUM 40 MILLIGRAM(S): 80 TABLET, FILM COATED ORAL at 21:33

## 2017-12-26 NOTE — ED PROVIDER NOTE - MEDICAL DECISION MAKING DETAILS
47F pmhx DM, HLD, here w/ right breast pain & heat x 3 days. On exam pt mildly ttp, slight increased temp noticed in R breast. Plan for EKG, CXR, basic labs, and out-pt mammogram f/u. Abscess vs CA vs cardiac issue.

## 2017-12-26 NOTE — ED PROVIDER NOTE - GASTROINTESTINAL NEGATIVE STATEMENT, MLM
no abdominal pain, no diarrhea, no nausea and no vomiting. no abdominal pain, no bloating, no constipation, no diarrhea, no nausea and no vomiting.

## 2017-12-26 NOTE — ED PROVIDER NOTE - ATTENDING CONTRIBUTION TO CARE
MD Goetz:  patient seen and evaluated with the resident.  I was present for key portions of the History & Physical, and I agree with the Impression & Plan.  MD Goetz:  46 yo F, c/o 2d of "breast pain."  She has no cutaneous or constitutional complaints to suggest infection, nor does she have any physical exam findings concerning for infectious etiology.  However, she has a MHx of CAD, and reports that her AMI that she had 3yrs ago felt just like this.  Furthermore, she describes a 1wk Hx of CAMPBELL.  ECG wnl.  Physical Exam: unremarkable.  Breast exam is unremarkable:  no masses, no erythema, no TTP.  CTA B, RRR.  Impression:  ACS until proven otherwise.  Plan:  , Hermelinda, reassess. If 1st trop negative --> CDU for stress in AM.

## 2017-12-26 NOTE — ED CDU PROVIDER INITIAL DAY NOTE - PMH
CVA (cerebral vascular accident)    Diabetes    Disc disorder  herniated  Hyperlipidemia, unspecified hyperlipidemia type CAD (coronary artery disease)  (Pt states she was told she had an MI in the past; denies hx/o stent or other interventional procedure)  Chronic back pain  (thoracic)  CVA (cerebral vascular accident)    Diabetes    Disc disorder  (herniated discs to back/?neck)  Hyperlipidemia, unspecified hyperlipidemia type

## 2017-12-26 NOTE — ED PROVIDER NOTE - OBJECTIVE STATEMENT
47F pmhx DM, HLD, right shoulder lipoma, here c/o R breast pain that started 3 days ago (Saturday night while sleeping). Pt describes waking up from this 8/10 pain in her R breast Saturday night, classifying it as a burning pain that produced actual heat in her R breast. Pt says she has never had this before however she does suffer from chronic pain in her shoulder and back of unknown origin. Per the pt the pain has been constant since. She denies fevers and discharge from her breast. She also denies headache, n/v/d, urinary symptoms. No history of heart problems and the pain is not related to movement or breathing. 47F pmhx DM, HLD, right shoulder lipoma, here c/o R breast pain that started 3 days ago (Saturday night while sleeping). Pt describes waking up from this 8/10 pain in her R breast Saturday night, classifying it as a burning pain that produced actual heat in her R breast. Pt says she has never had this before however she does suffer from chronic pain in her shoulder and back of unknown origin. Per the pt the pain has been constant since. She denies fevers and discharge from her breast. She also denies headache, n/v/d, urinary symptoms. Her pain is not related to movement or breathing. 47F pmhx DM, HLD, CAD, right shoulder lipoma, here c/o R breast pain that started 3 days ago (Saturday night while sleeping). Pt describes waking up from this 8/10 pain in her R breast Saturday night, classifying it as a burning pain that produced actual heat in her R breast. Pt says she has never had this before however she does suffer from chronic pain in her shoulder and back of unknown origin. Per the pt the pain has been constant since. She denies fevers and discharge from her breast. She also denies headache, n/v/d, urinary symptoms. Her pain is not related to movement or breathing.

## 2017-12-26 NOTE — ED CDU PROVIDER INITIAL DAY NOTE - CARDIOVASCULAR [-], MLM
no syncope/pt reports feeling like left leg has been swollen x 2 days (no pain, no hx/o trauma)/no palpitations

## 2017-12-26 NOTE — ED ADULT TRIAGE NOTE - CHIEF COMPLAINT QUOTE
Pt c/o of four days of right breast pain states pain was worse today and she had back  PMH DM MI no stents or surgery.

## 2017-12-26 NOTE — ED CDU PROVIDER INITIAL DAY NOTE - ATTENDING CONTRIBUTION TO CARE
MD Goetz:  I have personally performed a face to face diagnostic evaluation on this patient with the PA.  I have reviewed the ACP note and agree with the history, exam, and plan of care, except as noted.  History and Exam by me shows a 46 yo F, c/o 2d of "breast pain."  She has no cutaneous or constitutional complaints to suggest infection, nor does she have any physical exam findings concerning for infectious etiology.  However, she has a MHx of CAD, and reports that her AMI that she had 3yrs ago felt just like this.  Furthermore, she describes a 1wk Hx of CAMPBELL.  ECG wnl.  Physical Exam: unremarkable.  Breast exam is unremarkable:  no masses, no erythema, no TTP.  CTA B, RRR.   ECG unremarkable.  1st trop < 0.06. Impression:  ACS until proven otherwise.  Plan:  serial enzymes, CDU for stress in AM.

## 2017-12-26 NOTE — ED CDU PROVIDER INITIAL DAY NOTE - MUSCULOSKELETAL, MLM
Range of motion is not limited, no thoracic abnormality noted on visualization or palpation. Range of motion is not limited, no thoracic abnormality noted on visualization or palpation.  Breast exam bilaterally chaperoned by SIMEON Crane; on my exam, no palpable mass or TTP, no skin changes noted.  Lower extremities are without pitting edema; no gross asymmetry noted.  On calf palpation, no objective TTP; pt states "sometimes" she gets discomfort "on and off".

## 2017-12-26 NOTE — ED CDU PROVIDER INITIAL DAY NOTE - OBJECTIVE STATEMENT
47F pmhx DM, HLD, CAD, right shoulder lipoma, here c/o R breast pain that started 3 days ago (Saturday night while sleeping). Pt describes waking up from this 8/10 pain in her R breast Saturday night, classifying it as a burning pain that produced actual heat in her R breast. Pt says she has never had this before however she does suffer from chronic pain in her shoulder and back of unknown origin. Per the pt the pain has been constant since. She denies fevers and discharge from her breast. She also denies headache, n/v/d, urinary symptoms. Her pain is not related to movement or breathing.    CDU HANNAH Marshall Note------  46 yo female with PMH of CAD, DM, HTN, herniated discs, presenting to the ED with right breast pain.  Pt was evaluated in the ED had indicated that she had similar pain with episode of chest pain due to CAD in the past.  CE #1 was negative; pt was sent to CDU for cardiac telemetry monitoring, CE #2, stress test.   On CDU arrival, pt has no active c/o.  CDU plan discussed with patient who verbalizes agreement with plan. 47F pmhx DM, HLD, CAD, right shoulder lipoma, here c/o R breast pain that started 3 days ago (Saturday night while sleeping). Pt describes waking up from this 8/10 pain in her R breast Saturday night, classifying it as a burning pain that produced actual heat in her R breast. Pt says she has never had this before however she does suffer from chronic pain in her shoulder and back of unknown origin. Per the pt the pain has been constant since. She denies fevers and discharge from her breast. She also denies headache, n/v/d, urinary symptoms. Her pain is not related to movement or breathing.    CDU HANNAH Marshall Note------  46 yo female with PMH of CAD (pt states she was told she had a heart attack; denies having any type of intervention), DM, HTN, herniated discs, chronic back pain, presenting to the ED with right breast pain.  Pt was evaluated in the ED had indicated that she had similar pain with episode of chest pain due to CAD in the past.  CE #1 was negative; pt was sent to CDU for cardiac telemetry monitoring, CE #2, stress test.   On CDU arrival, pt has no active c/o apart from right breast pain; states pain feels "deep"; no c/o SOB/dyspnea / abdominal pain at this time.  Pt states she has also noticed x past 2 days that left leg "feels swollen"; no hx/o trauma or injury; no pain to leg; no active discomfort to left leg at the present time.  Pt. denies any right leg pain / discomfort / swelling / issues.  No hx/o fever/chills, cough, recent illness, N/V, or other c/o.  CDU plan discussed with patient who verbalizes agreement with plan.  Regarding pt's outpatient providers, pt states she does not like the cardiologist she was seeing in the past; states she has not followed up in "awhile" and will not be going back to that cardiologist.  Pt follows with PMD Dr. Briana Ceja.    Regarding home meds, pt states she takes aspirin 81 mg every other day as was advised to her by her PMD; states she stopped taking gabapentin and baclofen for her chronic back pain because she felt that it wasn't helping; hasn't followed back up with PMD regarding this.  Pt. also states she stopped taking metformin ~2 months ago because her fingerstick glucose checks have been averaging 120s - 130's (including postprandial checks) and pt felt metformin was no longer necessary.  Pt states her only home meds at present is aspirin 81 mg every other day, Tylenol prn, and atorvastatin 40 mg at bedtime.

## 2017-12-27 VITALS
HEART RATE: 76 BPM | SYSTOLIC BLOOD PRESSURE: 102 MMHG | RESPIRATION RATE: 16 BRPM | OXYGEN SATURATION: 99 % | DIASTOLIC BLOOD PRESSURE: 70 MMHG | TEMPERATURE: 98 F

## 2017-12-27 LAB
CHOLEST SERPL-MCNC: 238 MG/DL — HIGH (ref 120–199)
CK MB BLD-MCNC: 0.8 — SIGNIFICANT CHANGE UP (ref 0–2.5)
CK MB BLD-MCNC: 1.63 NG/ML — SIGNIFICANT CHANGE UP (ref 1–4.7)
CK SERPL-CCNC: 211 U/L — HIGH (ref 25–170)
HDLC SERPL-MCNC: 30 MG/DL — LOW (ref 45–65)
LIPID PNL WITH DIRECT LDL SERPL: 181 MG/DL — SIGNIFICANT CHANGE UP
TRIGL SERPL-MCNC: 240 MG/DL — HIGH (ref 10–149)
TROPONIN T SERPL-MCNC: < 0.06 NG/ML — SIGNIFICANT CHANGE UP (ref 0–0.06)

## 2017-12-27 PROCEDURE — 78452 HT MUSCLE IMAGE SPECT MULT: CPT | Mod: 26

## 2017-12-27 PROCEDURE — 93016 CV STRESS TEST SUPVJ ONLY: CPT | Mod: GC

## 2017-12-27 PROCEDURE — 93018 CV STRESS TEST I&R ONLY: CPT | Mod: GC

## 2017-12-27 PROCEDURE — 99217: CPT

## 2017-12-27 RX ORDER — ACETAMINOPHEN 500 MG
650 TABLET ORAL ONCE
Qty: 0 | Refills: 0 | Status: COMPLETED | OUTPATIENT
Start: 2017-12-27 | End: 2017-12-27

## 2017-12-27 RX ORDER — OXYCODONE AND ACETAMINOPHEN 5; 325 MG/1; MG/1
1 TABLET ORAL ONCE
Qty: 0 | Refills: 0 | Status: DISCONTINUED | OUTPATIENT
Start: 2017-12-27 | End: 2017-12-27

## 2017-12-27 RX ORDER — FAMOTIDINE 10 MG/ML
20 INJECTION INTRAVENOUS ONCE
Qty: 0 | Refills: 0 | Status: COMPLETED | OUTPATIENT
Start: 2017-12-27 | End: 2017-12-27

## 2017-12-27 RX ADMIN — Medication 650 MILLIGRAM(S): at 13:22

## 2017-12-27 RX ADMIN — OXYCODONE AND ACETAMINOPHEN 1 TABLET(S): 5; 325 TABLET ORAL at 00:35

## 2017-12-27 RX ADMIN — FAMOTIDINE 20 MILLIGRAM(S): 10 INJECTION INTRAVENOUS at 06:49

## 2017-12-27 RX ADMIN — Medication 81 MILLIGRAM(S): at 12:09

## 2017-12-27 RX ADMIN — OXYCODONE AND ACETAMINOPHEN 1 TABLET(S): 5; 325 TABLET ORAL at 01:33

## 2017-12-27 RX ADMIN — Medication 650 MILLIGRAM(S): at 13:52

## 2017-12-27 RX ADMIN — Medication 30 MILLILITER(S): at 06:49

## 2017-12-27 NOTE — ED CDU PROVIDER SUBSEQUENT DAY NOTE - ATTENDING CONTRIBUTION TO CARE
Dr Bloch, ATTENDING MD-  I performed a face to face bedside interview with patient regarding history of present illness, review of symptoms and past medical history. I completed an independent physical exam.  I have discussed patient's plan of care with PA.   Documentation as above in the note.

## 2017-12-27 NOTE — ED CDU PROVIDER DISPOSITION NOTE - ATTENDING CONTRIBUTION TO CARE
Dr Bloch, ATTENDING MD-  I performed a face to face bedside interview with patient regarding history of present illness, review of symptoms and past medical history. I completed an independent physical exam.  I have discussed patient's plan of care with PA.   Documentation as above in the note by me. Dr Bloch

## 2017-12-27 NOTE — ED CDU PROVIDER DISPOSITION NOTE - CLINICAL COURSE
Patient came in with right breast pain  on top of her usual chronic back pain and leg pain, 47F pmhx DM, HLD, CAD, right shoulder lipoma, here c/o R breast pain that started 3 days ago , no trauma  no exercise induced. Pt describes waking up from this 8/10 pain in her R breast Saturday night, classifying it as a burning pain that produced actual heat in her R breast. Pt says she has never had this before however she does suffer from chronic pain in her shoulder and back. The pain has been constant . Work up has been negative for DVT,  neg cardiac enzymes, neg stress test. labs sign for elvated glucose, elevated cholesterol. Patient is often noncompliant with meds by Hx.  Wes for discharge- will f/u PMD 47F pmhx DM, HLD, CAD, right shoulder lipoma, here c/o R breast pain that started 3 days ago , no trauma  no exercise induced. Pt describes waking up from this 8/10 pain in her R breast Saturday night, classifying it as a burning pain that produced actual heat in her R breast. Pt says she has never had this before however she does suffer from chronic pain in her shoulder and back. The pain has been constant . Work up has been negative for DVT,  neg cardiac enzymes, neg stress test. labs sign for elevated glucose, elevated cholesterol. Patient is often noncompliant with meds by Derik Harevy for discharge- will f/u PMD . Consider rheum referral.

## 2017-12-27 NOTE — ED CDU PROVIDER SUBSEQUENT DAY NOTE - HISTORY
47F pmhx DM, HLD, CAD, right shoulder lipoma, here c/o R breast pain that started 3 days ago. Has chronic pain syndromes, supposed hx of MI in past. 47F pmhx DM, HLD, CAD, right shoulder lipoma, here c/o R breast pain that started 3 days ago. Has chronic pain syndromes, supposed hx of MI in past. CVA, noncompliant with meds.

## 2017-12-27 NOTE — ED CDU PROVIDER SUBSEQUENT DAY NOTE - PROGRESS NOTE DETAILS
Pt resting comfortably in interim; no issues or complaints to date.  CE #2 negative; US LLE negative.  Pt. will be signed out to day CDU PA and attending at 0700 hrs. Pts stress test normal- ambulating around ED. pt to follow up with PMD. pt agrees with plan. ready for DC.

## 2017-12-27 NOTE — ED CDU PROVIDER SUBSEQUENT DAY NOTE - PMH
CAD (coronary artery disease)  (Pt states she was told she had an MI in the past; denies hx/o stent or other interventional procedure)  Chronic back pain  (thoracic)  CVA (cerebral vascular accident)    Diabetes    Disc disorder  (herniated discs to back/?neck)  Hyperlipidemia, unspecified hyperlipidemia type

## 2018-01-02 DIAGNOSIS — R69 ILLNESS, UNSPECIFIED: ICD-10-CM

## 2018-06-20 ENCOUNTER — APPOINTMENT (OUTPATIENT)
Dept: ORTHOPEDIC SURGERY | Facility: CLINIC | Age: 48
End: 2018-06-20
Payer: MEDICAID

## 2018-06-20 VITALS
WEIGHT: 172 LBS | HEIGHT: 64 IN | DIASTOLIC BLOOD PRESSURE: 70 MMHG | SYSTOLIC BLOOD PRESSURE: 102 MMHG | BODY MASS INDEX: 29.37 KG/M2 | HEART RATE: 78 BPM

## 2018-06-20 DIAGNOSIS — M54.16 RADICULOPATHY, LUMBAR REGION: ICD-10-CM

## 2018-06-20 PROCEDURE — 72040 X-RAY EXAM NECK SPINE 2-3 VW: CPT

## 2018-06-20 PROCEDURE — 72100 X-RAY EXAM L-S SPINE 2/3 VWS: CPT

## 2018-06-20 PROCEDURE — 99215 OFFICE O/P EST HI 40 MIN: CPT

## 2018-07-02 ENCOUNTER — FORM ENCOUNTER (OUTPATIENT)
Age: 48
End: 2018-07-02

## 2018-07-03 ENCOUNTER — APPOINTMENT (OUTPATIENT)
Dept: MRI IMAGING | Facility: CLINIC | Age: 48
End: 2018-07-03
Payer: MEDICAID

## 2018-07-03 ENCOUNTER — OUTPATIENT (OUTPATIENT)
Dept: OUTPATIENT SERVICES | Facility: HOSPITAL | Age: 48
LOS: 1 days | End: 2018-07-03
Payer: MEDICAID

## 2018-07-03 DIAGNOSIS — R22.30 LOCALIZED SWELLING, MASS AND LUMP, UNSPECIFIED UPPER LIMB: Chronic | ICD-10-CM

## 2018-07-03 DIAGNOSIS — Z00.8 ENCOUNTER FOR OTHER GENERAL EXAMINATION: ICD-10-CM

## 2018-07-03 PROCEDURE — 72148 MRI LUMBAR SPINE W/O DYE: CPT | Mod: 26

## 2018-07-03 PROCEDURE — 72141 MRI NECK SPINE W/O DYE: CPT

## 2018-07-03 PROCEDURE — 72141 MRI NECK SPINE W/O DYE: CPT | Mod: 26

## 2018-07-03 PROCEDURE — 72148 MRI LUMBAR SPINE W/O DYE: CPT

## 2018-07-06 ENCOUNTER — RESULT REVIEW (OUTPATIENT)
Age: 48
End: 2018-07-06

## 2018-07-16 PROBLEM — M51.9 UNSPECIFIED THORACIC, THORACOLUMBAR AND LUMBOSACRAL INTERVERTEBRAL DISC DISORDER: Chronic | Status: ACTIVE | Noted: 2017-04-17

## 2018-07-26 ENCOUNTER — EMERGENCY (EMERGENCY)
Facility: HOSPITAL | Age: 48
LOS: 1 days | Discharge: HOME | End: 2018-07-26
Attending: EMERGENCY MEDICINE | Admitting: EMERGENCY MEDICINE
Payer: MEDICAID

## 2018-07-26 VITALS
OXYGEN SATURATION: 100 % | HEART RATE: 83 BPM | TEMPERATURE: 98 F | RESPIRATION RATE: 16 BRPM | DIASTOLIC BLOOD PRESSURE: 75 MMHG | SYSTOLIC BLOOD PRESSURE: 118 MMHG

## 2018-07-26 DIAGNOSIS — R22.30 LOCALIZED SWELLING, MASS AND LUMP, UNSPECIFIED UPPER LIMB: Chronic | ICD-10-CM

## 2018-07-26 PROCEDURE — 99283 EMERGENCY DEPT VISIT LOW MDM: CPT

## 2018-07-26 RX ORDER — IBUPROFEN 200 MG
600 TABLET ORAL ONCE
Qty: 0 | Refills: 0 | Status: COMPLETED | OUTPATIENT
Start: 2018-07-26 | End: 2018-07-26

## 2018-07-26 RX ORDER — ACETAMINOPHEN 500 MG
650 TABLET ORAL ONCE
Qty: 0 | Refills: 0 | Status: COMPLETED | OUTPATIENT
Start: 2018-07-26 | End: 2018-07-26

## 2018-07-26 RX ADMIN — Medication 650 MILLIGRAM(S): at 21:52

## 2018-07-26 RX ADMIN — Medication 600 MILLIGRAM(S): at 21:52

## 2018-07-26 RX ADMIN — Medication 1 TABLET(S): at 21:52

## 2018-07-26 NOTE — ED PROVIDER NOTE - OBJECTIVE STATEMENT
48y F presents to the ED for tactile fever, headache, body aches, dizziness, and ear pain x3 days. States left ear pain worse than right ear pain. Also has mild cough. No recent travel or sick contact.

## 2018-07-26 NOTE — ED PROVIDER NOTE - ENMT, MLM
Airway patent, Nasal mucosa clear. Mouth with normal mucosa. Throat has no vesicles, no oropharyngeal exudates and uvula is midline. Left TM erythematous and bulging. No discharge. Right TM clear. Dry mucous membranes. Supple neck

## 2018-07-26 NOTE — ED PROVIDER NOTE - MEDICAL DECISION MAKING DETAILS
48y F with few days of left earache, myalgia, headache, and subjective fever. Likely viral syndrome. However given worsening earache will cover for otitis media. Give abx, NSAIDs, tylenol, and dc home with PCP follow up 48y F with few days of left earache, myalgia, headache, and subjective fever. Likely viral syndrome. However given worsening earache and tm changes will cover for otitis media. Give abx, NSAIDs, tylenol, and dc home with PCP follow up

## 2018-07-27 PROBLEM — I63.9 CEREBRAL INFARCTION, UNSPECIFIED: Chronic | Status: ACTIVE | Noted: 2017-10-07

## 2018-07-27 PROBLEM — M54.9 DORSALGIA, UNSPECIFIED: Chronic | Status: ACTIVE | Noted: 2017-12-26

## 2018-07-27 PROBLEM — I25.10 ATHEROSCLEROTIC HEART DISEASE OF NATIVE CORONARY ARTERY WITHOUT ANGINA PECTORIS: Chronic | Status: ACTIVE | Noted: 2017-12-26

## 2018-08-13 ENCOUNTER — APPOINTMENT (OUTPATIENT)
Dept: PHYSICAL MEDICINE AND REHAB | Facility: CLINIC | Age: 48
End: 2018-08-13

## 2018-08-20 ENCOUNTER — EMERGENCY (EMERGENCY)
Facility: HOSPITAL | Age: 48
LOS: 1 days | Discharge: HOME | End: 2018-08-20
Attending: EMERGENCY MEDICINE | Admitting: EMERGENCY MEDICINE
Payer: MEDICAID

## 2018-08-20 VITALS
SYSTOLIC BLOOD PRESSURE: 118 MMHG | HEART RATE: 78 BPM | RESPIRATION RATE: 18 BRPM | DIASTOLIC BLOOD PRESSURE: 97 MMHG | TEMPERATURE: 98 F | OXYGEN SATURATION: 100 %

## 2018-08-20 DIAGNOSIS — R22.30 LOCALIZED SWELLING, MASS AND LUMP, UNSPECIFIED UPPER LIMB: Chronic | ICD-10-CM

## 2018-08-20 PROCEDURE — 99284 EMERGENCY DEPT VISIT MOD MDM: CPT | Mod: 25

## 2018-08-20 PROCEDURE — 93010 ELECTROCARDIOGRAM REPORT: CPT

## 2018-08-20 NOTE — ED ADULT TRIAGE NOTE - CHIEF COMPLAINT QUOTE
Pt sent by PCP for abnormal EKG.  PT co epigastric pain that radiates to back.  Pain worse with lying and deep breathing.  ekg in progress.  PT NAD.

## 2018-08-21 VITALS
RESPIRATION RATE: 17 BRPM | DIASTOLIC BLOOD PRESSURE: 65 MMHG | HEART RATE: 73 BPM | SYSTOLIC BLOOD PRESSURE: 117 MMHG | OXYGEN SATURATION: 99 % | TEMPERATURE: 98 F

## 2018-08-21 LAB
ALBUMIN SERPL ELPH-MCNC: 4.4 G/DL — SIGNIFICANT CHANGE UP (ref 3.3–5)
ALP SERPL-CCNC: 55 U/L — SIGNIFICANT CHANGE UP (ref 40–120)
ALT FLD-CCNC: 17 U/L — SIGNIFICANT CHANGE UP (ref 4–33)
AST SERPL-CCNC: 20 U/L — SIGNIFICANT CHANGE UP (ref 4–32)
BASOPHILS # BLD AUTO: 0.05 K/UL — SIGNIFICANT CHANGE UP (ref 0–0.2)
BASOPHILS NFR BLD AUTO: 0.7 % — SIGNIFICANT CHANGE UP (ref 0–2)
BILIRUB SERPL-MCNC: < 0.2 MG/DL — LOW (ref 0.2–1.2)
BUN SERPL-MCNC: 8 MG/DL — SIGNIFICANT CHANGE UP (ref 7–23)
CALCIUM SERPL-MCNC: 9.2 MG/DL — SIGNIFICANT CHANGE UP (ref 8.4–10.5)
CHLORIDE SERPL-SCNC: 100 MMOL/L — SIGNIFICANT CHANGE UP (ref 98–107)
CHOLEST SERPL-MCNC: 196 MG/DL — SIGNIFICANT CHANGE UP (ref 120–199)
CO2 SERPL-SCNC: 25 MMOL/L — SIGNIFICANT CHANGE UP (ref 22–31)
CREAT SERPL-MCNC: 0.69 MG/DL — SIGNIFICANT CHANGE UP (ref 0.5–1.3)
EOSINOPHIL # BLD AUTO: 0.67 K/UL — HIGH (ref 0–0.5)
EOSINOPHIL NFR BLD AUTO: 9.4 % — HIGH (ref 0–6)
GLUCOSE SERPL-MCNC: 149 MG/DL — HIGH (ref 70–99)
HCG SERPL-ACNC: < 5 MIU/ML — SIGNIFICANT CHANGE UP
HCT VFR BLD CALC: 35 % — SIGNIFICANT CHANGE UP (ref 34.5–45)
HDLC SERPL-MCNC: 31 MG/DL — LOW (ref 45–65)
HGB BLD-MCNC: 10.9 G/DL — LOW (ref 11.5–15.5)
IMM GRANULOCYTES # BLD AUTO: 0.01 # — SIGNIFICANT CHANGE UP
IMM GRANULOCYTES NFR BLD AUTO: 0.1 % — SIGNIFICANT CHANGE UP (ref 0–1.5)
LIDOCAIN IGE QN: 30.2 U/L — SIGNIFICANT CHANGE UP (ref 7–60)
LIPID PNL WITH DIRECT LDL SERPL: 149 MG/DL — SIGNIFICANT CHANGE UP
LYMPHOCYTES # BLD AUTO: 3.8 K/UL — HIGH (ref 1–3.3)
LYMPHOCYTES # BLD AUTO: 53.1 % — HIGH (ref 13–44)
MCHC RBC-ENTMCNC: 27.3 PG — SIGNIFICANT CHANGE UP (ref 27–34)
MCHC RBC-ENTMCNC: 31.1 % — LOW (ref 32–36)
MCV RBC AUTO: 87.5 FL — SIGNIFICANT CHANGE UP (ref 80–100)
MONOCYTES # BLD AUTO: 0.48 K/UL — SIGNIFICANT CHANGE UP (ref 0–0.9)
MONOCYTES NFR BLD AUTO: 6.7 % — SIGNIFICANT CHANGE UP (ref 2–14)
NEUTROPHILS # BLD AUTO: 2.15 K/UL — SIGNIFICANT CHANGE UP (ref 1.8–7.4)
NEUTROPHILS NFR BLD AUTO: 30 % — LOW (ref 43–77)
NRBC # FLD: 0 — SIGNIFICANT CHANGE UP
PLATELET # BLD AUTO: 279 K/UL — SIGNIFICANT CHANGE UP (ref 150–400)
PMV BLD: 10.1 FL — SIGNIFICANT CHANGE UP (ref 7–13)
POTASSIUM SERPL-MCNC: 4.2 MMOL/L — SIGNIFICANT CHANGE UP (ref 3.5–5.3)
POTASSIUM SERPL-SCNC: 4.2 MMOL/L — SIGNIFICANT CHANGE UP (ref 3.5–5.3)
PROT SERPL-MCNC: 7.6 G/DL — SIGNIFICANT CHANGE UP (ref 6–8.3)
RBC # BLD: 4 M/UL — SIGNIFICANT CHANGE UP (ref 3.8–5.2)
RBC # FLD: 14.6 % — HIGH (ref 10.3–14.5)
SODIUM SERPL-SCNC: 137 MMOL/L — SIGNIFICANT CHANGE UP (ref 135–145)
TRIGL SERPL-MCNC: 162 MG/DL — HIGH (ref 10–149)
TROPONIN T, HIGH SENSITIVITY: < 6 NG/L — SIGNIFICANT CHANGE UP (ref ?–14)
TROPONIN T, HIGH SENSITIVITY: < 6 NG/L — SIGNIFICANT CHANGE UP (ref ?–14)
WBC # BLD: 7.16 K/UL — SIGNIFICANT CHANGE UP (ref 3.8–10.5)
WBC # FLD AUTO: 7.16 K/UL — SIGNIFICANT CHANGE UP (ref 3.8–10.5)

## 2018-08-21 PROCEDURE — 76705 ECHO EXAM OF ABDOMEN: CPT | Mod: 26

## 2018-08-21 PROCEDURE — 71046 X-RAY EXAM CHEST 2 VIEWS: CPT | Mod: 26

## 2018-08-21 RX ORDER — FAMOTIDINE 10 MG/ML
20 INJECTION INTRAVENOUS ONCE
Qty: 0 | Refills: 0 | Status: COMPLETED | OUTPATIENT
Start: 2018-08-21 | End: 2018-08-21

## 2018-08-21 RX ORDER — IBUPROFEN 200 MG
400 TABLET ORAL ONCE
Qty: 0 | Refills: 0 | Status: COMPLETED | OUTPATIENT
Start: 2018-08-21 | End: 2018-08-21

## 2018-08-21 RX ORDER — LIDOCAINE 4 G/100G
5 CREAM TOPICAL ONCE
Qty: 0 | Refills: 0 | Status: COMPLETED | OUTPATIENT
Start: 2018-08-21 | End: 2018-08-21

## 2018-08-21 RX ORDER — ACETAMINOPHEN 500 MG
975 TABLET ORAL ONCE
Qty: 0 | Refills: 0 | Status: COMPLETED | OUTPATIENT
Start: 2018-08-21 | End: 2018-08-21

## 2018-08-21 RX ADMIN — Medication 975 MILLIGRAM(S): at 02:04

## 2018-08-21 RX ADMIN — LIDOCAINE 5 MILLILITER(S): 4 CREAM TOPICAL at 02:04

## 2018-08-21 RX ADMIN — Medication 975 MILLIGRAM(S): at 01:01

## 2018-08-21 RX ADMIN — Medication 400 MILLIGRAM(S): at 07:16

## 2018-08-21 RX ADMIN — Medication 30 MILLILITER(S): at 02:04

## 2018-08-21 RX ADMIN — FAMOTIDINE 20 MILLIGRAM(S): 10 INJECTION INTRAVENOUS at 02:04

## 2018-08-21 NOTE — ED PROVIDER NOTE - NS ED ROS FT
CONST: no fevers, no chills, no trauma  EYES: no pain, no visual disturbances  ENT: no sore throat, no epistaxis, no rhinorrhea, no hearing changes  CV: + chest pain, no palpitations, no orthopnea, no extremity pain or swelling  RESP: no shortness of breath, no cough, no sputum, no pleurisy, no wheezing  ABD: no abdominal pain, no nausea, no vomiting, no diarrhea, no black or bloody stool  : no dysuria, no hematuria, no frequency, no urgency  MSK: + back pain, no neck pain, no extremity pain  NEURO: no headache, no sensory disturbances, no focal weakness, no dizziness  HEME: no easy bleeding or bruising  SKIN: no diaphoresis, no rash

## 2018-08-21 NOTE — ED PROVIDER NOTE - CARE PLAN
Principal Discharge DX:	Chest pain Principal Discharge DX:	Chest pain  Secondary Diagnosis:	Gastroesophageal reflux disease without esophagitis

## 2018-08-21 NOTE — ED PROVIDER NOTE - MEDICAL DECISION MAKING DETAILS
47 yo F hx HTN, HLD, DM2, CVA, presenting with CP, ACS r/o, likely admit 47 yo F hx HTN, HLD, DM2, CVA, presenting with CP, ACS r/o, no risk factors for PE, no EKG findings suspicious for pericarditis, no friction rub heard on auscultation, pulses are equal b/l and not hypertensive- patient is stable low risk aortic dissection, no difficulty breathing/sob unlikely tension pneumo or Boorhave's (no retching/emesis), likely CDU vs admit for stress testing 49 yo F hx HTN, HLD, DM2, CVA, presenting with CP, ACS r/o, no risk factors for PE, no EKG findings suspicious for pericarditis, no friction rub heard on auscultation, pulses are equal b/l and not hypertensive- patient is stable low risk aortic dissection, no difficulty breathing/sob unlikely tension pneumo or Boorhave's (no retching/emesis). Will obtain labs and imaging and reassess.

## 2018-08-21 NOTE — ED PROVIDER NOTE - PHYSICAL EXAMINATION
GEN: NAD, A & O x 3  HEAD/EYES: NCAT, PERRL, EOMI, anicteric sclerae, no conjunctival pallor  ENT: mucus membranes moist, oropharynx WNL, trachea midline, no JVD  RESP: lungs CTA with equal breath sounds bilaterally, chest wall nontender and atraumatic  CV: heart with reg rhythm S1, S2, no murmur; distal pulses intact and symmetric bilaterally  ABDOMEN: normoactive bowel sounds, soft, nondistended, nontender, no palpable masses  : no CVAT  MSK: extremities atraumatic, no edema, no asymmetry. back ttp ~T4-T5, there is no spinal deformity or stepoff and the back is ranged painlessly.  SKIN: warm, dry, no rash, no bruising, no cyanosis. color appropriate for ethnicity  NEURO: alert, mentating appropriately, no facial asymmetry. gross sensation, motor, coordination are intact  PSYCH: Affect appropriate

## 2018-08-21 NOTE — ED PROVIDER NOTE - ATTENDING CONTRIBUTION TO CARE
DR. SANTOS, ATTENDING MD-  I performed a face to face bedside interview with patient regarding history of present illness, review of symptoms and past medical history. I completed an independent physical exam.  I have discussed patient's plan of care with the resident.   Documentation as above in the note.   HPI: 49 yo F hx HTN, DM2, HLD, CVA, CAD (no stents/CABG), presenting with chest pain x 4 days of chest pain radiating to back, sharp in nature, worse with lying supine and with deep breath described as "needle". Denies nausea/vomiting. No numbness/tingling. Pain acutely worsened tonight, non exertional. Denies recent URI sx, no congestion/cough. No recent travel/surgeries. No calf pain/swelling. States she did not take aspirin because she cannot take aspirin as it makes her throat feel like it is closing/getting stuck. Hasn't seen a cardiologist in years.   EXAM: NAD, heart RRR, no M/R/G, lungs ctab, abd tenderness to palpation in RUQ, otherwise diffusely non tender, no guarding, no rebound.   MDM: concern for GERD vs ACS. Will obtain labs for both and imaging. Most likely should stay for cardiac stress given history of MI complaint of chest pain and not seeing Cards for years. Pt amenable to plan. DR. SANTOS, ATTENDING MD-  I performed a face to face bedside interview with patient regarding history of present illness, review of symptoms and past medical history. I completed an independent physical exam.  I have discussed patient's plan of care with the resident.   Documentation as above in the note.   HPI: 47 yo F hx HTN, DM2, HLD, CVA, CAD (no stents/CABG), presenting with chest pain x 4 days of chest pain radiating to back, sharp in nature, worse with lying supine and with deep breath described as "needle". Denies nausea/vomiting. No numbness/tingling. Pain acutely worsened tonight, non exertional. Denies recent URI sx, no congestion/cough. No recent travel/surgeries. No calf pain/swelling. States she did not take aspirin because she cannot take aspirin as it makes her throat feel like it is closing/getting stuck. Hasn't seen a cardiologist in years.   EXAM: NAD, heart RRR, no M/R/G, lungs ctab, abd tenderness to palpation in RUQ, otherwise diffusely non tender, no guarding, no rebound.   MDM: concern for GERD vs ACS. Will obtain labs for both and imaging. Normal stress Dec 2017, Normal ECHO within last year.

## 2018-08-21 NOTE — ED PROVIDER NOTE - PROGRESS NOTE DETAILS
[place] PGY2: Pt resting comfortably at bedside, states that her pain is improved since coming to ED. Discussed lab results and ultrasound findings, and recommended patient stay for stress testing. Patient unable to stay as her daughter is leaving for college in the morning. She understands the risks of leaving without further work up, and will follow up with cardiologist or return to ED for any worsening symptoms. Montrell PGY2: pt changed her mind and would like to stay for further work up, will CDU pt for stress testing [place]rell PGY2: Pt resting comfortably at bedside, states that her pain is improved since coming to ED. Discussed lab results and ultrasound findings. Trop neg x 1. Will continue to monitor and reassess. Repeat Trop. TOM: Upon chart review, pt has had a normal Stress test Dec 2017, and normal ECHO within last year. Trop Neg x2. Will discharge to f/u with PMD and cards, most likely not ACS causing symptoms, most likely GERD. Will advise strong return precautions.

## 2018-08-21 NOTE — ED ADULT NURSE NOTE - OBJECTIVE STATEMENT
pt arrives w/ c/o chest pain and back pain x few days. pt states she was seen at her PMD earlier and told she had an abnormal EKG and referred to the ED. pt states also has hx of chronic back pain. IV accessed 22 gauge Left hand labs sent. waiting further orders will continue to monitor.

## 2018-08-21 NOTE — ED PROVIDER NOTE - OBJECTIVE STATEMENT
47 yo F hx HTN, DM2, HLD, CVA, CAD (no stents/CABG), presenting with chest pain x 4 days of chest pain radiating to back, sharp in nature, worse with lying supine and with deep breath described as "needle". Denies nausea/vomiting. No numbness/tingling. Pain acutely worsened tonight, non exertional. Denies recent URI sx, no congestion/cough. No recent travel/surgeries. No calf pain/swelling. States she did not take aspirin because she cannot take aspirin as it makes her throat feel like it is closing/getting stuck.      PCP Dr. Stubbs, no cardiologist, last stress/echo two years ago 47 yo F hx HTN, DM2, HLD, CVA, CAD (no stents/CABG), presenting with chest pain x 4 days of chest pain radiating to back, sharp in nature, worse with lying supine and with deep breath described as "needle". Denies nausea/vomiting. No numbness/tingling. Pain acutely worsened tonight, non exertional. Denies recent URI sx, no congestion/cough. No recent travel/surgeries. No calf pain/swelling. States she did not take aspirin because she cannot take aspirin as it makes her throat feel like it is closing/getting stuck.    Sent in by PMD for abnormal EKG today.     PCP Dr. Stubbs, no cardiologist, last stress/echo two years ago

## 2019-01-16 NOTE — ED ADULT NURSE NOTE - CAS EDN DISCHARGE ASSESSMENT
23262 Problem Focused - Low complexity
Alert and oriented to person, place and time/Awake/Patient baseline mental status

## 2019-02-01 ENCOUNTER — OUTPATIENT (OUTPATIENT)
Dept: OUTPATIENT SERVICES | Facility: HOSPITAL | Age: 49
LOS: 1 days | End: 2019-02-01
Payer: MEDICAID

## 2019-02-01 DIAGNOSIS — R22.30 LOCALIZED SWELLING, MASS AND LUMP, UNSPECIFIED UPPER LIMB: Chronic | ICD-10-CM

## 2019-02-01 PROCEDURE — G9001: CPT

## 2019-02-18 ENCOUNTER — EMERGENCY (EMERGENCY)
Facility: HOSPITAL | Age: 49
LOS: 1 days | Discharge: HOME | End: 2019-02-18
Attending: EMERGENCY MEDICINE | Admitting: EMERGENCY MEDICINE
Payer: MEDICAID

## 2019-02-18 VITALS — SYSTOLIC BLOOD PRESSURE: 132 MMHG | HEART RATE: 91 BPM | DIASTOLIC BLOOD PRESSURE: 89 MMHG | RESPIRATION RATE: 18 BRPM

## 2019-02-18 DIAGNOSIS — R22.30 LOCALIZED SWELLING, MASS AND LUMP, UNSPECIFIED UPPER LIMB: Chronic | ICD-10-CM

## 2019-02-18 LAB
ALBUMIN SERPL ELPH-MCNC: 4.3 G/DL — SIGNIFICANT CHANGE UP (ref 3.3–5)
ALP SERPL-CCNC: 56 U/L — SIGNIFICANT CHANGE UP (ref 40–120)
ALT FLD-CCNC: 13 U/L — SIGNIFICANT CHANGE UP (ref 4–33)
ANION GAP SERPL CALC-SCNC: 14 MMO/L — SIGNIFICANT CHANGE UP (ref 7–14)
APTT BLD: 28.9 SEC — SIGNIFICANT CHANGE UP (ref 27.5–36.3)
AST SERPL-CCNC: 21 U/L — SIGNIFICANT CHANGE UP (ref 4–32)
BASOPHILS # BLD AUTO: 0.05 K/UL — SIGNIFICANT CHANGE UP (ref 0–0.2)
BASOPHILS NFR BLD AUTO: 0.7 % — SIGNIFICANT CHANGE UP (ref 0–2)
BILIRUB SERPL-MCNC: < 0.2 MG/DL — LOW (ref 0.2–1.2)
BUN SERPL-MCNC: 8 MG/DL — SIGNIFICANT CHANGE UP (ref 7–23)
CALCIUM SERPL-MCNC: 9 MG/DL — SIGNIFICANT CHANGE UP (ref 8.4–10.5)
CHLORIDE SERPL-SCNC: 101 MMOL/L — SIGNIFICANT CHANGE UP (ref 98–107)
CO2 SERPL-SCNC: 23 MMOL/L — SIGNIFICANT CHANGE UP (ref 22–31)
CREAT SERPL-MCNC: 0.72 MG/DL — SIGNIFICANT CHANGE UP (ref 0.5–1.3)
EOSINOPHIL # BLD AUTO: 0.62 K/UL — HIGH (ref 0–0.5)
EOSINOPHIL NFR BLD AUTO: 9.2 % — HIGH (ref 0–6)
GLUCOSE SERPL-MCNC: 156 MG/DL — HIGH (ref 70–99)
HCT VFR BLD CALC: 39.4 % — SIGNIFICANT CHANGE UP (ref 34.5–45)
HGB BLD-MCNC: 12.5 G/DL — SIGNIFICANT CHANGE UP (ref 11.5–15.5)
IMM GRANULOCYTES NFR BLD AUTO: 0.1 % — SIGNIFICANT CHANGE UP (ref 0–1.5)
INR BLD: 1.01 — SIGNIFICANT CHANGE UP (ref 0.88–1.17)
LYMPHOCYTES # BLD AUTO: 3.24 K/UL — SIGNIFICANT CHANGE UP (ref 1–3.3)
LYMPHOCYTES # BLD AUTO: 48.1 % — HIGH (ref 13–44)
MCHC RBC-ENTMCNC: 28.9 PG — SIGNIFICANT CHANGE UP (ref 27–34)
MCHC RBC-ENTMCNC: 31.7 % — LOW (ref 32–36)
MCV RBC AUTO: 91.2 FL — SIGNIFICANT CHANGE UP (ref 80–100)
MONOCYTES # BLD AUTO: 0.43 K/UL — SIGNIFICANT CHANGE UP (ref 0–0.9)
MONOCYTES NFR BLD AUTO: 6.4 % — SIGNIFICANT CHANGE UP (ref 2–14)
NEUTROPHILS # BLD AUTO: 2.38 K/UL — SIGNIFICANT CHANGE UP (ref 1.8–7.4)
NEUTROPHILS NFR BLD AUTO: 35.5 % — LOW (ref 43–77)
NRBC # FLD: 0 K/UL — LOW (ref 25–125)
PLATELET # BLD AUTO: 283 K/UL — SIGNIFICANT CHANGE UP (ref 150–400)
PMV BLD: 9.9 FL — SIGNIFICANT CHANGE UP (ref 7–13)
POTASSIUM SERPL-MCNC: 4.1 MMOL/L — SIGNIFICANT CHANGE UP (ref 3.5–5.3)
POTASSIUM SERPL-SCNC: 4.1 MMOL/L — SIGNIFICANT CHANGE UP (ref 3.5–5.3)
PROT SERPL-MCNC: 7.5 G/DL — SIGNIFICANT CHANGE UP (ref 6–8.3)
PROTHROM AB SERPL-ACNC: 11.5 SEC — SIGNIFICANT CHANGE UP (ref 9.8–13.1)
RBC # BLD: 4.32 M/UL — SIGNIFICANT CHANGE UP (ref 3.8–5.2)
RBC # FLD: 15.1 % — HIGH (ref 10.3–14.5)
SODIUM SERPL-SCNC: 138 MMOL/L — SIGNIFICANT CHANGE UP (ref 135–145)
TROPONIN T, HIGH SENSITIVITY: < 6 NG/L — SIGNIFICANT CHANGE UP (ref ?–14)
WBC # BLD: 6.73 K/UL — SIGNIFICANT CHANGE UP (ref 3.8–10.5)
WBC # FLD AUTO: 6.73 K/UL — SIGNIFICANT CHANGE UP (ref 3.8–10.5)

## 2019-02-18 PROCEDURE — 70450 CT HEAD/BRAIN W/O DYE: CPT | Mod: 26

## 2019-02-18 PROCEDURE — 99220: CPT

## 2019-02-18 RX ORDER — ACETAMINOPHEN 500 MG
975 TABLET ORAL ONCE
Qty: 0 | Refills: 0 | Status: COMPLETED | OUTPATIENT
Start: 2019-02-18 | End: 2019-02-18

## 2019-02-18 RX ORDER — ASPIRIN/CALCIUM CARB/MAGNESIUM 324 MG
81 TABLET ORAL ONCE
Qty: 0 | Refills: 0 | Status: COMPLETED | OUTPATIENT
Start: 2019-02-18 | End: 2019-02-18

## 2019-02-18 RX ORDER — KETOROLAC TROMETHAMINE 30 MG/ML
30 SYRINGE (ML) INJECTION ONCE
Qty: 0 | Refills: 0 | Status: DISCONTINUED | OUTPATIENT
Start: 2019-02-18 | End: 2019-02-18

## 2019-02-18 RX ADMIN — Medication 30 MILLIGRAM(S): at 23:16

## 2019-02-18 RX ADMIN — Medication 81 MILLIGRAM(S): at 19:47

## 2019-02-18 RX ADMIN — Medication 975 MILLIGRAM(S): at 20:09

## 2019-02-18 NOTE — ED ADULT TRIAGE NOTE - CHIEF COMPLAINT QUOTE
headache that began two days ago worsening today to pain and pressure behind L eye, onset of L sided arm weakness last night that she noted was worse when she awoke today 5am. Pt does have L  weakness no drift no facial droop.     FIT eval by Dr Rodriguez no code stroke called d/t headache that began two days ago worsening today to pain and pressure behind L eye, onset of L sided arm weakness last night that she noted was worse when she awoke today 5am. Pt does have L  weakness no drift no facial droop.     FIT eval by Dr Rodriguez no code stroke called d/t window

## 2019-02-18 NOTE — ED ADULT NURSE NOTE - CHIEF COMPLAINT QUOTE
headache that began two days ago worsening today to pain and pressure behind L eye, onset of L sided arm weakness last night that she noted was worse when she awoke today 5am. Pt does have L  weakness no drift no facial droop.     FIT eval by Dr Rodriguez no code stroke called d/t window

## 2019-02-18 NOTE — ED ADULT NURSE NOTE - OBJECTIVE STATEMENT
Pt received in exam room Pt received in TR-A. Presents to Ed    headache that began two days ago worsening today to pain and pressure behind L eye, onset of L sided arm weakness last night that she noted was worse when she awoke today 5am. Pt does have L  weakness no drift no facial droop. Pt received in TR-A. Presents to ED co headache x 2 days ago with worsening pain behind left eye today. States she had lefta rm weakness     headache that began two days ago worsening today to pain and pressure behind L eye, onset of L sided arm weakness last night that she noted was worse when she awoke today 5am. Pt does have L  weakness no drift no facial droop. Pt received in TR-A. Presents to ED co headache x 2 days ago with worsening pain behind left eye today. States she also had left arm weakness since yesterday and began worse this morning after waking up. No facial droopmDenies n/v/d, dizziness, sob, fevers, chills. No swelling noted to extremities. Pt received in TR-A. Presents to ED co headache x 2 days ago with worsening pain behind left eye today. States she also had left arm weakness since yesterday and began worse this morning after waking up. No facial droop or slurred speech. Pt states she had hx of heart attack in 2014 and was hospitalized for 8 days and states "nothing was done". Denies n/v/d, dizziness, sob, fevers, chills. No swelling noted to extremities. Labs sent. IV placed. VS stable. NSR on cm. In nad. Will continue to monitor.

## 2019-02-18 NOTE — CONSULT NOTE ADULT - ASSESSMENT
Impression:    Plan: Impression: Current headaches could be migrainous vs cervicogenic in nature. Left sided weakness probably secondary to radiculopathy.    Plan: Impression: Current headaches could be migrainous vs cervicogenic in nature. Left sided weakness probably secondary to radiculopathy. Headaches could also be from pseudotumor cerebri.    Plan: Impression: Current headaches could be migrainous vs cervicogenic in nature. Left sided weakness probably secondary to radiculopathy. Can be worked up further on outpatient basis.    Plan:  -outpatient neurology follow up with either [address] [place] [address] (phone: [phone]) or / neurology clinic ([phone])    case discussed with Dr. Antony Cha, attending neurologist

## 2019-02-18 NOTE — ED PROVIDER NOTE - CLINICAL SUMMARY MEDICAL DECISION MAKING FREE TEXT BOX
radha recurrent sx of headache and chest pain which have been worked up in the past. however, as the sx have recurred they will be addressed.  CT head ordered and neurology to be consulted.   cardiac troponins. ASA to be given.

## 2019-02-18 NOTE — ED PROVIDER NOTE - ATTENDING CONTRIBUTION TO CARE
radha: hx from pt. Some details are vague and appears to have diff recalling some details.  1: left sided headaches for 3 days. blurred vision left eye 4 days. left sided weakness more than one day.   2: fluttering in chest, appears ot be chronic and intermittent.  3: mid chest pain for ?? one day. intermittent, pt states she had an MI in 2004, but then changed that to 2014.  states she was hospitalized at [facility] for 8 days but did not have a cardiac cath.   states she has been having intermittent CP in the past , every 2 months.  PMH includes DM and elevated cholesterol. She called her PCP last week re the palpitations and was told to take ASA. She did not take ASA today.   exam: there is diffuse mild left sided weakness upper and lower extremity, compared to Rt. There is a  constricted left visual field.   There is reproducible central and left sided chest pain.   EKG: pending.  review of EMR indicates a past w/u for headaches and left sided weakness, including MRs of head and neck whi9ch were normal. Pt also has a normal echo and normal nuclear stress test.   impression: recurrent sx which have been worked up in the past. however, as the sx have recurred they min be addressed.  CT head ordered and neurology to be consulted.   cardiac troponins. ASA to be given.  labs and cxr. radha: hx from pt. Some details are vague and appears to have diff recalling some details.  1: left sided headaches for 3 days. blurred vision left eye 4 days. left sided weakness more than one day.   2: fluttering in chest, appears to be chronic and intermittent.  3: mid chest pain for ?? one day. intermittent, pt states she had an MI in 2004, but then changed that to 2014.  states she was hospitalized at [facility] for 8 days but did not have a cardiac cath.   states she has been having intermittent CP in the past , every 2 months.  PMH includes DM and elevated cholesterol. She called her PCP last week re the palpitations and was told to take ASA. She did not take ASA today.   exam: there is diffuse mild left sided weakness upper and lower extremity, compared to Rt. There is a  constricted left visual field.   There is reproducible central and left sided chest pain.   EKG: pending.  review of EMR indicates a past w/u for headaches and left sided weakness, including MRs of head and neck whi9ch were normal. Pt also has a normal echo and normal nuclear stress test.   impression: recurrent sx which have been worked up in the past. however, as the sx have recurred they min be addressed.  CT head ordered and neurology to be consulted.   cardiac troponins. ASA to be given.  labs and cxr.

## 2019-02-18 NOTE — ED PROVIDER NOTE - PROGRESS NOTE DETAILS
Jennifer BRYANT: I was asked to see this patient for potential stroke. Patient is a 50 yo F with history of DM, hyperlipidemia, CVA here for left eye heaviness, left arm and leg weakness since last night. Patient states symptoms worse since 10/11 AM. She is c/o mild headache and pain in her left shoulder. Exam: CN 2-12 intact, no pronator drift, strength is decreased on left, 4/5 compared to right. Patient feels heavy in left leg. Patient possibly has a CVA, however patient outside of window for TPA. No stroke code called based on time of onset. Patient to be seen in main ED. radha: given toradol for chronic shoulder pain. will transfer to CDU for MR evaluation and trending troponins. Sukh Josue (Resident): CDU accepted patient - placing MRI orders for morning - CDU aware MRI's being ordered and will follow or sign out to follow results in observation unit

## 2019-02-18 NOTE — ED ADULT NURSE NOTE - NSIMPLEMENTINTERV_GEN_ALL_ED
Implemented All Universal Safety Interventions:  Couch to call system. Call bell, personal items and telephone within reach. Instruct patient to call for assistance. Room bathroom lighting operational. Non-slip footwear when patient is off stretcher. Physically safe environment: no spills, clutter or unnecessary equipment. Stretcher in lowest position, wheels locked, appropriate side rails in place.

## 2019-02-18 NOTE — ED PROVIDER NOTE - OBJECTIVE STATEMENT
radha: hx from pt. Some details are vague and appears to have diff recalling some details.  1: left sided headaches for 3 days. blurred vision left eye 4 days. left sided weakness more than one day.   2: fluttering in chest, appears to be chronic and intermittent.  3: mid chest pain for ?? one day. intermittent, pt states she had an MI in 2004, but then changed that to 2014.  states she was hospitalized at [facility] for 8 days but did not have a cardiac cath.   states she has been having intermittent CP in the past , every 2 months.  PMH includes DM and elevated cholesterol. She called her PCP last week re the palpitations and was told to take ASA. She did not take ASA today.   exam: there is diffuse mild left sided weakness upper and lower extremity, compared to Rt. There is a  constricted left visual field.   There is reproducible central and left sided chest pain.   EKG: pending.  review of EMR indicates a past w/u for headaches and left sided weakness, including MRs of head and neck whi9ch were normal. Pt also has a normal echo and normal nuclear stress test.   impression: recurrent sx which have been worked up in the past. however, as the sx have recurred they min be addressed.  CT head ordered and neurology to be consulted.   cardiac troponins. ASA to be given.

## 2019-02-18 NOTE — CONSULT NOTE ADULT - SUBJECTIVE AND OBJECTIVE BOX
MEDHAT [id number] is a 49y old  Female who presents with a chief complaint of     HPI:          MEDICATIONS  (STANDING):    MEDICATIONS  (PRN):    PAST MEDICAL & SURGICAL HISTORY:  Chronic back pain: (thoracic)  CAD (coronary artery disease): (Pt states she was told she had an MI in the past; denies hx/o stent or other interventional procedure)  CVA (cerebral vascular accident)  Disc disorder: (herniated discs to back/?neck)  Hyperlipidemia, unspecified hyperlipidemia type  Diabetes  Mass of shoulder region: R shoulder 1998 for lipoma    FAMILY HISTORY:  Family history of hypertension (Father)  Family history of diabetes mellitus (DM) (Mother, Father)  Family history of breast cancer (Mother)    Allergies    No Known Allergies    Intolerances        SHx - No smoking, No ETOH, No drug abuse      Review of Systems:  CONSTITUTIONAL:   HEENT:  No visual loss, blurred vision, double vision.  No hearing loss, sneezing, congestion, runny nose or sore throat.  SKIN:  No rash or itching.  CARDIOVASCULAR:  No chest pain, chest pressure or chest discomfort. No palpitations or edema.  RESPIRATORY:  No shortness of breath, cough or sputum.  GASTROINTESTINAL:  No anorexia, nausea, vomiting or diarrhea. No abdominal pain.  GENITOURINARY:  NO dysuria. No increased frequency. No retention. No incontinence.  NEUROLOGICAL:  See HPI  MUSCULOSKELETAL:  No muscle, back pain, joint pain or stiffness.  HEMATOLOGIC:  No anemia, bleeding or bruising.  PSYCHIATRIC:    ENDOCRINOLOGIC:  No reports of sweating, cold or heat intolerance. No polyuria or polydipsia.        Vital Signs Last 24 Hrs  T(C): 36.7 (18 Feb 2019 19:08), Max: 36.7 (18 Feb 2019 19:08)  T(F): 98.1 (18 Feb 2019 19:08), Max: 98.1 (18 Feb 2019 19:08)  HR: 82 (18 Feb 2019 19:08) (82 - 91)  BP: 124/88 (18 Feb 2019 19:08) (124/88 - 132/89)  BP(mean): --  RR: 18 (18 Feb 2019 19:08) (18 - 18)  SpO2: 100% (18 Feb 2019 19:08) (100% - 100%)    General Exam:   General appearance: No acute distress    Cardiac:  Pulm:                 Neurological Exam:  Mental Status: Orientated to self, date and place.  Attention intact.  No dysarthria. Speech fluent.  Cranial Nerves:   PERRL, EOMI, VFF, no nystagmus.    CN V1-3 intact to light touch .  No facial asymmetry.  Hearing intact to finger rub bilaterally.  Tongue, uvula and palate midline.  Sternocleidomastoid and Trapezius intact bilaterally.    Motor:   Tone: normal.                  Strength:     [] Upper extremity                      Delt       Bicep    Tricep                                                  R         5/5 5/5 5/5 5/5                                               L          5/5 5/5 5/5 5/5  [] Lower extremity                       HF          KE          KF        DF         PF                                               R        5/5 5/5 5/5 5/5 5/5                                               L         5/5 5/5 5/5 5/5 5/5  Pronator drift: none                 Dysmetria: None to finger-nose-finger or heel-shin-heel  No truncal ataxia.    Tremor: No resting, postural or action tremor.  No myoclonus.    Sensation: intact to light touch, pinprick, vibration and proprioception    Deep Tendon Reflexes:     Biceps          Triceps      BR        Patellar        Ankle         Babinski                                         R       2+                   2+           2+            2+               2+           downgoing                                         L        2+                  2+           2+            2+               2+           downgoing    Gait: normal.      Other:    02-18    138  |  101  |  8   ----------------------------<  156<H>  4.1   |  23  |  0.72    Ca    9.0      18 Feb 2019 18:30    TPro  7.5  /  Alb  4.3  /  TBili  < 0.2<L>  /  DBili  x   /  AST  21  /  ALT  13  /  AlkPhos  56  02-18                            12.5   6.73  )-----------( 283      ( 18 Feb 2019 18:30 )             39.4       Radiology    CT:   MRI  EKG:  tele:  TTE:  EEG: MEDHAT [id number] is a 49y old  Female who presents with a chief complaint of     HPI:          MEDICATIONS  (STANDING):    MEDICATIONS  (PRN):    PAST MEDICAL & SURGICAL HISTORY:  Chronic back pain: (thoracic)  CAD (coronary artery disease): (Pt states she was told she had an MI in the past; denies hx/o stent or other interventional procedure)  CVA (cerebral vascular accident)  Disc disorder: (herniated discs to back/?neck)  Hyperlipidemia, unspecified hyperlipidemia type  Diabetes  Mass of shoulder region: R shoulder 1998 for lipoma    FAMILY HISTORY:  Family history of hypertension (Father)  Family history of diabetes mellitus (DM) (Mother, Father)  Family history of breast cancer (Mother)    Allergies    No Known Allergies    Intolerances        SHx - No smoking, No ETOH, No drug abuse      Review of Systems:    see hpi    Vital Signs Last 24 Hrs  T(C): 36.7 (18 Feb 2019 19:08), Max: 36.7 (18 Feb 2019 19:08)  T(F): 98.1 (18 Feb 2019 19:08), Max: 98.1 (18 Feb 2019 19:08)  HR: 82 (18 Feb 2019 19:08) (82 - 91)  BP: 124/88 (18 Feb 2019 19:08) (124/88 - 132/89)  BP(mean): --  RR: 18 (18 Feb 2019 19:08) (18 - 18)  SpO2: 100% (18 Feb 2019 19:08) (100% - 100%)    Neurological Exam:  Mental Status: Orientated to self, date and place.  Attention intact.  No dysarthria. Speech fluent.  Cranial Nerves:   PERRL, EOMI, VFF, no nystagmus.    CN V1-3 intact to light touch .  No facial asymmetry.  Hearing intact to finger rub bilaterally.  Tongue, uvula and palate midline.  Sternocleidomastoid and Trapezius intact bilaterally.    Motor:   Tone: normal.                  Strength:     [] Upper extremity                      Delt       Bicep    Tricep                                                  R         5/5        5/5        5/5       5/5                                               L          5/5        5/5        5/5       5/5  [] Lower extremity                       HF          KE          KF        DF         PF                                               R        5/5        5/5        5/5       5/5       5/5                                               L         5/5        5/5       5/5       5/5        5/5  Pronator drift: none                 Dysmetria: None to finger-nose-finger or heel-shin-heel  No truncal ataxia.    Tremor: No resting, postural or action tremor.  No myoclonus.    Sensation: intact to light touch, pinprick, vibration and proprioception    Deep Tendon Reflexes:     Biceps          Triceps      BR        Patellar        Ankle         Babinski                                         R       2+                   2+           2+            2+               2+           downgoing                                         L        2+                  2+           2+            2+               2+           downgoing    Gait: normal.      Other:    02-18    138  |  101  |  8   ----------------------------<  156<H>  4.1   |  23  |  0.72    Ca    9.0      18 Feb 2019 18:30    TPro  7.5  /  Alb  4.3  /  TBili  < 0.2<L>  /  DBili  x   /  AST  21  /  ALT  13  /  AlkPhos  56  02-18                            12.5   6.73  )-----------( 283      ( 18 Feb 2019 18:30 )             39.4       Radiology    CT:   MRI  EKG:  tele:  TTE:  EEG: MEDHAT [id number] is a 49y old  Female who presents with a chief complaint of     HPI: 48 yo F with history of DM, cervical radiculopathy, presents to the ER with worsening left arm pain and weakness along with headaches. She reports that the pain has worsened in the last 3 weeks, and was acutely worse yesterday, which prompted her to come the ER. She has been to the ER several times over the years for similar complaints. She has a sensation of left arm "heaviness."    She reports that she has been having headaches for years. During her usual headaches, she develops watery eyes, phonophobia, and tinnitus. Denies any flashing lights. Denies any visual loss. She reports that she gets headaches 2 times per month.     She has had     Per ER documentation      MEDICATIONS  (STANDING):    MEDICATIONS  (PRN):    PAST MEDICAL & SURGICAL HISTORY:  Chronic back pain: (thoracic)  CAD (coronary artery disease): (Pt states she was told she had an MI in the past; denies hx/o stent or other interventional procedure)  CVA (cerebral vascular accident)  Disc disorder: (herniated discs to back/?neck)  Hyperlipidemia, unspecified hyperlipidemia type  Diabetes  Mass of shoulder region: R shoulder 1998 for lipoma    FAMILY HISTORY:  Family history of hypertension (Father)  Family history of diabetes mellitus (DM) (Mother, Father)  Family history of breast cancer (Mother)    Allergies    No Known Allergies    Intolerances        SHx - No smoking, No ETOH, No drug abuse      Review of Systems:    see hpi    Vital Signs Last 24 Hrs  T(C): 36.7 (18 Feb 2019 19:08), Max: 36.7 (18 Feb 2019 19:08)  T(F): 98.1 (18 Feb 2019 19:08), Max: 98.1 (18 Feb 2019 19:08)  HR: 82 (18 Feb 2019 19:08) (82 - 91)  BP: 124/88 (18 Feb 2019 19:08) (124/88 - 132/89)  BP(mean): --  RR: 18 (18 Feb 2019 19:08) (18 - 18)  SpO2: 100% (18 Feb 2019 19:08) (100% - 100%)    Neurological Exam:  Mental Status: Orientated to self, date and place.  Attention intact.  No dysarthria. Speech fluent.  Cranial Nerves:  EOMI, VFF, no nystagmus. No facial asymmetry.   Tongue midline.                    Strength:     [] Upper extremity                      Delt       Bicep    Tricep                                                  R         4+/5        4+/5        4+/5       4+/5                                               L          5/5        5/5        5/5       5/5  [] Lower extremity                       HF          KE          KF        DF         PF                                               R        5/5        5/5        5/5       5/5       5/5                                               L         5/5        5/5       5/5       5/5        5/5  Pronator drift: none                 Dysmetria: None to finger-nose-finger  No truncal ataxia.    Tremor: No resting, postural or action tremor.      Sensation: subjectively, it is diminshed on the left arm compared to the right arm    Deep Tendon Reflexes:     Biceps          BR        Patellar        Ankle         Babinski                                         R       2+       2+            1+               1+           downgoing                                         L        2+       2+            1+               1+           downgoing    Other:    02-18    138  |  101  |  8   ----------------------------<  156<H>  4.1   |  23  |  0.72    Ca    9.0      18 Feb 2019 18:30    TPro  7.5  /  Alb  4.3  /  TBili  < 0.2<L>  /  DBili  x   /  AST  21  /  ALT  13  /  AlkPhos  56  02-18                            12.5   6.73  )-----------( 283      ( 18 Feb 2019 18:30 )             39.4       Radiology    CT: < from: CT Head No Cont (02.18.19 @ 19:24) >  FINDINGS:  There is no CT evidence of acute transcortical infarct. Age-related   involutional changes and microvascular ischemic changes are seen.    There is no hydrocephalus, mass effect, midline shift, or acute   intracranial hemorrhage. No extra-axial collection. Basal cisterns are   patent.    Mild mucosal thickening involving the ethmoid air cells. The visualized   paranasal sinuses and mastoid air cells are otherwise clear.    The calvarium is intact.    IMPRESSION:  No evidence of acute intracranial pathology.    < end of copied text >    MRI  EKG:  tele:  TTE:  EEG: MEDHAT [id number] is a 49y old  Female who presents with a chief complaint of     HPI: 50 yo F with history of DM, cervical radiculopathy, presents to the ER with worsening left arm pain and weakness along with headaches. She reports that the pain has worsened in the last 3 weeks, and was acutely worse yesterday, which prompted her to come the ER. She has been to the ER several times over the years for similar complaints. She has a sensation of left arm "heaviness," as well as left leg paresthesias.     She reports that she has been having headaches for years. During her usual headaches, she develops watery eyes, phonophobia, and tinnitus. Denies any flashing lights. Denies any visual loss. She reports that she gets headaches 2 times per month and she uses tylenol which relieves her headache.    She has had workup for these headaches in the past. In 2017, she also had headaches with left sided weakness. At that time, she underwent an MRI brain which was significant for partially empty sella. She also had an MRI cervical spine done previously as well, which showed right C6-C7 disc herniation. She is being managed nonsurgically.     MEDICATIONS  (STANDING):    MEDICATIONS  (PRN):    PAST MEDICAL & SURGICAL HISTORY:  Chronic back pain: (thoracic)  CAD (coronary artery disease): (Pt states she was told she had an MI in the past; denies hx/o stent or other interventional procedure)  CVA (cerebral vascular accident)  Disc disorder: (herniated discs to back/?neck)  Hyperlipidemia, unspecified hyperlipidemia type  Diabetes  Mass of shoulder region: R shoulder 1998 for lipoma    FAMILY HISTORY:  Family history of hypertension (Father)  Family history of diabetes mellitus (DM) (Mother, Father)  Family history of breast cancer (Mother)    Allergies    No Known Allergies    Intolerances        SHx - No smoking, No ETOH, No drug abuse      Review of Systems:    see hpi    Vital Signs Last 24 Hrs  T(C): 36.7 (18 Feb 2019 19:08), Max: 36.7 (18 Feb 2019 19:08)  T(F): 98.1 (18 Feb 2019 19:08), Max: 98.1 (18 Feb 2019 19:08)  HR: 82 (18 Feb 2019 19:08) (82 - 91)  BP: 124/88 (18 Feb 2019 19:08) (124/88 - 132/89)  BP(mean): --  RR: 18 (18 Feb 2019 19:08) (18 - 18)  SpO2: 100% (18 Feb 2019 19:08) (100% - 100%)    Neurological Exam:  Mental Status: Orientated to self, date and place.  Attention intact.  No dysarthria. Speech fluent.  Cranial Nerves:  EOMI, VFF, no nystagmus. No facial asymmetry.   Tongue midline.                    Strength:     [] Upper extremity                      Delt       Bicep    Tricep                                                  R         4+/5        4+/5        4+/5       4+/5                                               L          5/5        5/5        5/5       5/5  [] Lower extremity                       HF          KE          KF        DF         PF                                               R        5/5        5/5        5/5       5/5       5/5                                               L         5/5        5/5       5/5       5/5        5/5  Pronator drift: none                 Dysmetria: None to finger-nose-finger  No truncal ataxia.    Tremor: No resting, postural or action tremor.      Sensation: subjectively, it is diminshed on the left arm compared to the right arm    Deep Tendon Reflexes:     Biceps          BR        Patellar        Ankle         Babinski                                         R       2+       2+            1+               1+           downgoing                                         L        2+       2+            1+               1+           downgoing    Other:    02-18    138  |  101  |  8   ----------------------------<  156<H>  4.1   |  23  |  0.72    Ca    9.0      18 Feb 2019 18:30    TPro  7.5  /  Alb  4.3  /  TBili  < 0.2<L>  /  DBili  x   /  AST  21  /  ALT  13  /  AlkPhos  56  02-18                            12.5   6.73  )-----------( 283      ( 18 Feb 2019 18:30 )             39.4       Radiology    CT: < from: CT Head No Cont (02.18.19 @ 19:24) >  FINDINGS:  There is no CT evidence of acute transcortical infarct. Age-related   involutional changes and microvascular ischemic changes are seen.    There is no hydrocephalus, mass effect, midline shift, or acute   intracranial hemorrhage. No extra-axial collection. Basal cisterns are   patent.    Mild mucosal thickening involving the ethmoid air cells. The visualized   paranasal sinuses and mastoid air cells are otherwise clear.    The calvarium is intact.    IMPRESSION:  No evidence of acute intracranial pathology.    < end of copied text >    MRI  EKG:  tele:  TTE:  EEG:

## 2019-02-19 VITALS
HEART RATE: 74 BPM | SYSTOLIC BLOOD PRESSURE: 112 MMHG | TEMPERATURE: 98 F | OXYGEN SATURATION: 99 % | RESPIRATION RATE: 16 BRPM | DIASTOLIC BLOOD PRESSURE: 74 MMHG

## 2019-02-19 PROCEDURE — 70548 MR ANGIOGRAPHY NECK W/DYE: CPT | Mod: 26

## 2019-02-19 PROCEDURE — 70551 MRI BRAIN STEM W/O DYE: CPT | Mod: 26

## 2019-02-19 PROCEDURE — 99217: CPT

## 2019-02-19 RX ORDER — ACETAMINOPHEN 500 MG
650 TABLET ORAL ONCE
Qty: 0 | Refills: 0 | Status: COMPLETED | OUTPATIENT
Start: 2019-02-19 | End: 2019-02-19

## 2019-02-19 RX ORDER — ATORVASTATIN CALCIUM 80 MG/1
40 TABLET, FILM COATED ORAL AT BEDTIME
Qty: 0 | Refills: 0 | Status: DISCONTINUED | OUTPATIENT
Start: 2019-02-19 | End: 2019-02-22

## 2019-02-19 RX ADMIN — Medication 650 MILLIGRAM(S): at 14:11

## 2019-02-19 RX ADMIN — Medication 650 MILLIGRAM(S): at 04:43

## 2019-02-19 RX ADMIN — Medication 30 MILLILITER(S): at 03:43

## 2019-02-19 RX ADMIN — Medication 650 MILLIGRAM(S): at 03:43

## 2019-02-19 RX ADMIN — Medication 650 MILLIGRAM(S): at 14:10

## 2019-02-19 NOTE — ED CDU PROVIDER INITIAL DAY NOTE - ATTENDING CONTRIBUTION TO CARE
Seen and examined, have discussed plan of care with PA.   I agree with note as stated above, having amended the EMR as needed to reflect the findings. Seen and examined, have discussed plan of care with PA.   I agree with note as stated above, having amended the EMR as needed to reflect the findings.    Ermias:   1: left sided headaches for 3 days. blurred vision left eye 4 days. left sided weakness more than one day.   2: fluttering in chest, appears to be chronic and intermittent.  3: mid chest pain for ?? one day. intermittent, pt states she had an MI in 2004, but then changed that to 2014.  states she was hospitalized at [facility] for 8 days but did not have a cardiac cath.   states she has been having intermittent CP in the past , every 2 months.  PMH includes DM and elevated cholesterol. She called her PCP last week re the palpitations and was told to take ASA. She did not take ASA today.   exam: there is diffuse mild left sided weakness upper and lower extremity, compared to Rt. There is a  constricted left visual field.   There is reproducible central and left sided chest pain.   EKG: pending.  review of EMR indicates a past w/u for headaches and left sided weakness, including MRs of head and neck whi9ch were normal. Pt also has a normal echo and normal nuclear stress test.   impression: recurrent sx which have been worked up in the past. however, as the sx have recurred they min be addressed.  pt transferred to CDU for continued diagnostic w/u including MRI brain    cardiac troponins.

## 2019-02-19 NOTE — ED CDU PROVIDER SUBSEQUENT DAY NOTE - CARDIAC, MLM
Normal rate, regular rhythm.  Heart sounds S1, S2.  No murmurs, rubs or gallops. No LE edema. No calf tenderness.

## 2019-02-19 NOTE — ED CDU PROVIDER DISPOSITION NOTE - CLINICAL COURSE
49F with c/o HA x 5d, inc. L sided weakness x 2-3d assoc with neck pain and leg pain and chest burning pain. In ED had normal trop and no dynamic changes on EKG and improved HA but L sided weakness. CT head neg., sent to CDU for MRI and neuro eval. Pt. with improved pain overnight after meds and also improved strength exam on reassessment. Neuro eval. at bedside and review of MRI recommend 49F with c/o HA x 5d, inc. L sided weakness x 2-3d assoc with neck pain and leg pain and chest burning pain. In ED had normal trop and no dynamic changes on EKG and improved HA but L sided weakness. CT head neg., sent to CDU for MRI and neuro eval. Pt. with improved pain overnight after meds and also improved strength exam on reassessment. Neuro eval. at bedside and review of MRI recommend dc home as no signs of acute ischemic stroke, limitation may have been pain related.

## 2019-02-19 NOTE — ED CDU PROVIDER INITIAL DAY NOTE - MEDICAL DECISION MAKING DETAILS
Pt is a 48 y/o F nonsmoker PMHx DM, HLD, "chronic pain" p/w headache x 5 days -- r/o cva, likely musculoskeletal pain with radiculopathy -- mri, pain control, neuro checks

## 2019-02-19 NOTE — ED CDU PROVIDER INITIAL DAY NOTE - OBJECTIVE STATEMENT
Pt is a 48 y/o F nonsmoker PMHx DM, HLD, "chronic pain" p/w headache x 5 days.  Pt notes gradual onset, initially minimal left sided nonradiating headache, described as aching pain, which resolves with tylenol and motrin.  Pt states over 5 days, headache gradually worsened to maximal intensity of 8/10 yesterday.  Pt states she has had same headache intermittently for 3 years.  Pt notes for past three days, having gradual onset, left upper shoulder pain radiating down left arm and left lower back pain radiating down whole leg, associated with arm and leg "weakness" which pt describes as "heaviness."  Pt states she has had same pain for "at least 2 years."  Pt states she has seen a neurologist twice for similar problems and was told she was "thinking too much."  Pt also notes intermittent sharp sternal nonradiating, nonexertional, nonpleuritic, nonpositional chest pain since yesterday, lasting for seconds, without any specific triggers, which resolves with drinking cold water.  Pt denies any fevers, chills, nausea, vomiting, lightheadedness, dizziness, double vision, blurred vision, head trauma, use of blood thinners, calf pain/swelling, h/o dvt/pe in past, hemoptysis, h/o malignancy, recent prolonged immobilization, recent surgeries, hormonal replacement therapy, OCP use, illicit drug use, family history of heart disease.  Pt states she had a heart attack and a "mild" stroke in 2014, for which she was admitted to [facility] for "a few days," during which pt had a stress test, which pt states was normal.  Pt states she did not have a catheterization, stents, MRI, nor CT scan during that hospital stay.  In ED, pt had CT head which was normal.  Pt was seen by neurology who recommended discharge.  Pt received tylenol, with which pt states headache completely resolved.  Pt also notes weakness has resolved, but continues to have pain in arm and leg.

## 2019-02-19 NOTE — ED CDU PROVIDER SUBSEQUENT DAY NOTE - PROGRESS NOTE DETAILS
Pt resting comfortably.  Pt only complaining of "upset stomach" and recurrence of left shoulder pain.  Maalox and tylenol was ordered.

## 2019-02-19 NOTE — ED CDU PROVIDER DISPOSITION NOTE - NSFOLLOWUPINSTRUCTIONS_ED_ALL_ED_FT
Follow up with Neurology at [address] [place] [address]. [phone]. Take copy of results with you. Return to ER for any new or worsening symptoms, weakness, numbness, vision changes, slurred speech, vomiting or any other concerns.    Follow up with Cardiology and Gastroenterology for epigastric pain. You may take Pepcid 20mg twice a day as needed for burning pain. Take Maalox as needed over the counter.

## 2019-02-19 NOTE — ED CDU PROVIDER SUBSEQUENT DAY NOTE - MEDICAL DECISION MAKING DETAILS
48 y/o female with pmhx of DM, HLD, chronic back pain, presents to ED c/o multiple complaints, c/o acute on chronic left sided headaches and left shoulder pain radiating down arm, left leg pain and burning chest pain. plan- sent to cdu for r/o CVA MRI, neuro following, likely msk pain with radiculopathy, GI cocktail for burning CP. trop negative. outpt cards/GI follow up

## 2019-02-19 NOTE — ED CDU PROVIDER SUBSEQUENT DAY NOTE - MUSCULOSKELETAL, MLM
Spine appears normal, range of motion is not limited,. +left trapezius muscle TTP. full rom. atraumatic.

## 2019-02-19 NOTE — ED CDU PROVIDER SUBSEQUENT DAY NOTE - HISTORY
50 y/o female with pmhx of DM, HLD, chronic back pain, presents to ED c/o multiple complaints. Pt c/o left sided body pain x 3 days. States had gradual onset left sided headache, left shoulder pain that radiates down her left arm and left leg pain. Worse with movement, feels a pulling sensation in her shoulder. No heavy lifting or injuries. Feels weaker on that side. Pt c/o intermittent chest tightness x few days. 48 y/o female with pmhx of DM, HLD, chronic back pain, presents to ED c/o multiple complaints. Pt c/o left sided body pain x 3 days. States had gradual onset left sided headache, left shoulder pain that radiates down her left arm and left leg pain. Headaches similar to her headaches in the past. Better with tylenol. Worse with movement, feels a pulling sensation in her shoulder. No heavy lifting or injuries. Feels weaker on that side. Pt c/o intermittent chest "burning" x few days. Worse with food and sipping water. States she has had similar symptoms in the past. No recent travel, surgeries, hospitalizations, le edema. No fever, sob, palpitations, n/v, abd pain, numbness, slurred speech, dizziness, photophobia, facial drooping, ataxia.

## 2019-02-19 NOTE — ED CDU PROVIDER INITIAL DAY NOTE - NONTENDER LOCATION
left lower quadrant/umbilical/suprapubic/periumbilical/left costovertebral angle/right upper quadrant/right lower quadrant/left upper quadrant/right costovertebral angle

## 2019-02-20 DIAGNOSIS — Z71.89 OTHER SPECIFIED COUNSELING: ICD-10-CM

## 2019-03-06 ENCOUNTER — APPOINTMENT (OUTPATIENT)
Dept: CARDIOLOGY | Facility: CLINIC | Age: 49
End: 2019-03-06
Payer: MEDICAID

## 2019-03-06 ENCOUNTER — NON-APPOINTMENT (OUTPATIENT)
Age: 49
End: 2019-03-06

## 2019-03-06 VITALS
HEIGHT: 64 IN | SYSTOLIC BLOOD PRESSURE: 126 MMHG | DIASTOLIC BLOOD PRESSURE: 72 MMHG | WEIGHT: 170 LBS | BODY MASS INDEX: 29.02 KG/M2

## 2019-03-06 DIAGNOSIS — R07.9 CHEST PAIN, UNSPECIFIED: ICD-10-CM

## 2019-03-06 DIAGNOSIS — Z83.3 FAMILY HISTORY OF DIABETES MELLITUS: ICD-10-CM

## 2019-03-06 DIAGNOSIS — Z82.49 FAMILY HISTORY OF ISCHEMIC HEART DISEASE AND OTHER DISEASES OF THE CIRCULATORY SYSTEM: ICD-10-CM

## 2019-03-06 PROCEDURE — 93000 ELECTROCARDIOGRAM COMPLETE: CPT

## 2019-03-06 PROCEDURE — 93306 TTE W/DOPPLER COMPLETE: CPT

## 2019-03-06 PROCEDURE — 99204 OFFICE O/P NEW MOD 45 MIN: CPT

## 2019-03-06 RX ORDER — DICLOFENAC SODIUM 75 MG/1
75 TABLET, DELAYED RELEASE ORAL
Qty: 60 | Refills: 1 | Status: DISCONTINUED | COMMUNITY
Start: 2018-06-20 | End: 2019-03-06

## 2019-03-06 NOTE — DISCUSSION/SUMMARY
[FreeTextEntry1] : In a summary Nataly Portillo is a middle aged female with exertional shortness of breath, palpitations and chest pain, echo done showed normal LV systolic function. Stress echo to rule out ischemia. Hypercholesterolemia, on statins and low cholesterol diet. Diabetes, on medications and low Carb diet. Lose weight. Further plan based on stress test results.

## 2019-03-06 NOTE — REVIEW OF SYSTEMS
[Blurry Vision] : blurred vision [Shortness Of Breath] : shortness of breath [Palpitations] : palpitations [Joint Pain] : joint pain [Dizziness] : dizziness [Numbness (Hypesthesia)] : numbness [Negative] : Endocrine [Seeing Double (Diplopia)] : no diplopia [Eye Pain] : no eye pain [Eyeglasses] : not currently wearing eyeglasses [Lower Ext Edema] : no extremity edema [Tremor] : no tremor was seen [Convulsions] : no convulsions [Tingling (Paresthesia)] : no tingling [Easy Bleeding] : no tendency for easy bleeding [Easy Bruising] : no tendency for easy bruising

## 2019-03-06 NOTE — HISTORY OF PRESENT ILLNESS
[FreeTextEntry1] : Nataly Portillo is a 49 year old female with history of hypercholesterolemia and diabetes comes for cardiac evaluation. Complaining of mild shortness of breath on walking fast along with palpitations and mild heaviness in the chest which is relieved with rest in few minutes of 2 months duration which is getting worse. Complaint to medications and diet. Follows up with PCP.

## 2019-03-06 NOTE — REASON FOR VISIT
[Consultation] : a consultation regarding [Chest Pain] : chest pain [FreeTextEntry1] : shortness of breath on exertion.

## 2019-03-13 ENCOUNTER — APPOINTMENT (OUTPATIENT)
Dept: CARDIOLOGY | Facility: CLINIC | Age: 49
End: 2019-03-13
Payer: MEDICAID

## 2019-03-18 ENCOUNTER — APPOINTMENT (OUTPATIENT)
Dept: GASTROENTEROLOGY | Facility: CLINIC | Age: 49
End: 2019-03-18

## 2019-03-28 ENCOUNTER — APPOINTMENT (OUTPATIENT)
Dept: CARDIOLOGY | Facility: CLINIC | Age: 49
End: 2019-03-28
Payer: MEDICAID

## 2019-03-28 PROCEDURE — 93351 STRESS TTE COMPLETE: CPT

## 2019-04-01 NOTE — ED PROVIDER NOTE - RESPIRATORY [+], MLM
Calling insurance at [phone] to check status of prior auth. Rep [facility] Humira PA was denied on 3/28 due to criteria not met. LVM for appeals dept to get more info on how to appeal and why PA was denied.   COUGH

## 2019-05-31 NOTE — ED CDU PROVIDER SUBSEQUENT DAY NOTE - ENMT, MLM
Airway patent. Nasal mucosa clear. Mouth with normal mucosa. Throat has no vesicles, no oropharyngeal exudates and uvula is midline. [Negative] : Heme/Lymph

## 2019-07-01 ENCOUNTER — APPOINTMENT (OUTPATIENT)
Dept: SURGICAL ONCOLOGY | Facility: CLINIC | Age: 49
End: 2019-07-01
Payer: MEDICAID

## 2019-07-01 VITALS
DIASTOLIC BLOOD PRESSURE: 78 MMHG | SYSTOLIC BLOOD PRESSURE: 117 MMHG | BODY MASS INDEX: 29.02 KG/M2 | HEIGHT: 64 IN | WEIGHT: 170 LBS | OXYGEN SATURATION: 99 % | HEART RATE: 68 BPM

## 2019-07-01 DIAGNOSIS — K31.9 DISEASE OF STOMACH AND DUODENUM, UNSPECIFIED: ICD-10-CM

## 2019-07-01 DIAGNOSIS — N64.4 MASTODYNIA: ICD-10-CM

## 2019-07-01 DIAGNOSIS — Z86.69 PERSONAL HISTORY OF OTHER DISEASES OF THE NERVOUS SYSTEM AND SENSE ORGANS: ICD-10-CM

## 2019-07-01 PROCEDURE — 99214 OFFICE O/P EST MOD 30 MIN: CPT

## 2019-07-01 RX ORDER — BLOOD SUGAR DIAGNOSTIC
STRIP MISCELLANEOUS
Qty: 50 | Refills: 0 | Status: ACTIVE | COMMUNITY
Start: 2019-03-06

## 2019-07-01 RX ORDER — PANCRELIPASE LIPASE, PANCRELIPASE PROTEASE, PANCRELIPASE AMYLASE 15000; 47000; 63000 [USP'U]/1; [USP'U]/1; [USP'U]/1
15000-47000 CAPSULE, DELAYED RELEASE ORAL
Qty: 90 | Refills: 0 | Status: ACTIVE | COMMUNITY
Start: 2019-04-10

## 2019-07-01 RX ORDER — TOBRAMYCIN AND DEXAMETHASONE 3; 1 MG/ML; MG/ML
0.3-0.1 SUSPENSION/ DROPS OPHTHALMIC
Qty: 5 | Refills: 0 | Status: ACTIVE | COMMUNITY
Start: 2019-04-16

## 2019-07-01 RX ORDER — BLOOD-GLUCOSE METER
W/DEVICE KIT MISCELLANEOUS
Qty: 1 | Refills: 0 | Status: ACTIVE | COMMUNITY
Start: 2019-03-12

## 2019-07-01 RX ORDER — MOMETASONE FUROATE 1 MG/ML
0.1 SOLUTION TOPICAL
Qty: 60 | Refills: 0 | Status: ACTIVE | COMMUNITY
Start: 2019-03-07

## 2019-07-01 RX ORDER — SODIUM CHLORIDE 0.65 %
0.65 AEROSOL, SPRAY (ML) NASAL
Qty: 45 | Refills: 0 | Status: ACTIVE | COMMUNITY
Start: 2019-03-06

## 2019-07-01 RX ORDER — LORATADINE 10 MG/1
10 TABLET ORAL
Qty: 30 | Refills: 0 | Status: ACTIVE | COMMUNITY
Start: 2019-03-21

## 2019-07-01 NOTE — REASON FOR VISIT
[Initial Consultation] : an initial consultation for [Mastodynia] : mastodynia [FreeTextEntry2] : Right shoulder lipoma

## 2019-07-01 NOTE — PHYSICAL EXAM
[Normal] : supple, no neck mass and thyroid not enlarged [Normal Neck Lymph Nodes] : normal neck lymph nodes  [Normal Supraclavicular Lymph Nodes] : normal supraclavicular lymph nodes [Normal Axillary Lymph Nodes] : normal axillary lymph nodes [Normal] : oriented to person, place and time, with appropriate affect [de-identified] : fibrocystic but no masses or nipple discharge  [de-identified] : soft, mobile 10 cm right shoulder lipoma  [de-identified] : 15 cm mobile liopma right shoulder

## 2019-07-01 NOTE — CONSULT LETTER
[Dear  ___] : Dear  [unfilled], [Consult Letter:] : I had the pleasure of evaluating your patient, [unfilled]. [Please see my note below.] : Please see my note below. [Consult Closing:] : Thank you very much for allowing me to participate in the care of this patient.  If you have any questions, please do not hesitate to contact me. [Sincerely,] : Sincerely, [FreeTextEntry1] : I will keep you informed of the pathology of the lipoma and the sonogram results. [FreeTextEntry3] : Jhony Gomez MD FACS\par Chief of Surgical Oncology\par \par

## 2019-07-01 NOTE — HISTORY OF PRESENT ILLNESS
[de-identified] : 49 year old female presents for an initial consultation for breast pain.  She reports bilateral breast discomfort which is cyclic in nature and when laying on them.  She denies palpable breast masses, nipple discharge, skin changes or inversion.  She has a family history of breast cancer involving her mother in her 70s.  No family history of ovarian cancer. No prior abnormal mammograms or biopsies. \par \par Bilateral mammogram performed on 12/12/18 with no evidence of malignancy. BIRADS 2\par \par Bilateral Breast ultrasound 2/28/19 - Small hypoechoic nodule 9:00 right breast suggesting benign etiology.  Small cyst 9:00 right breast and 9 mm cyst 12:00 left breast increased in size.  BIRADS 3\par \par PMH notable for right shoulder lipoma which was excised 10 years ago however has significantly grown back in size.  She would like to have it removed. She states it puts pressure on her right breast. Other PMH notable for DM, HTN, HLD.

## 2019-07-01 NOTE — ASSESSMENT
[FreeTextEntry1] : IMP:\par Mastodynia\par Right shoulder lipoma\par BIRADS 3 breast ultrasound\par +FH\par \par PLAN:\par B/L breast ultrasound 8/2019\par \par Excision of right shoulder lipoma

## 2019-07-01 NOTE — REVIEW OF SYSTEMS
[Patient Intake Form Reviewed] : Patient intake form was reviewed [Negative] : Heme/Lymph [de-identified] : see HPI

## 2019-07-17 ENCOUNTER — APPOINTMENT (OUTPATIENT)
Dept: SURGICAL ONCOLOGY | Facility: HOSPITAL | Age: 49
End: 2019-07-17

## 2019-09-07 ENCOUNTER — EMERGENCY (EMERGENCY)
Facility: HOSPITAL | Age: 49
LOS: 1 days | Discharge: HOME | End: 2019-09-07
Admitting: EMERGENCY MEDICINE
Payer: MEDICAID

## 2019-09-07 VITALS
TEMPERATURE: 99 F | OXYGEN SATURATION: 100 % | SYSTOLIC BLOOD PRESSURE: 113 MMHG | DIASTOLIC BLOOD PRESSURE: 75 MMHG | HEART RATE: 81 BPM | RESPIRATION RATE: 18 BRPM

## 2019-09-07 VITALS
SYSTOLIC BLOOD PRESSURE: 122 MMHG | HEART RATE: 79 BPM | RESPIRATION RATE: 16 BRPM | OXYGEN SATURATION: 99 % | TEMPERATURE: 99 F | DIASTOLIC BLOOD PRESSURE: 80 MMHG

## 2019-09-07 DIAGNOSIS — R22.30 LOCALIZED SWELLING, MASS AND LUMP, UNSPECIFIED UPPER LIMB: Chronic | ICD-10-CM

## 2019-09-07 LAB
APPEARANCE UR: CLEAR — SIGNIFICANT CHANGE UP
BILIRUB UR-MCNC: NEGATIVE — SIGNIFICANT CHANGE UP
BLOOD UR QL VISUAL: NEGATIVE — SIGNIFICANT CHANGE UP
COLOR SPEC: SIGNIFICANT CHANGE UP
GLUCOSE UR-MCNC: NEGATIVE — SIGNIFICANT CHANGE UP
KETONES UR-MCNC: NEGATIVE — SIGNIFICANT CHANGE UP
LEUKOCYTE ESTERASE UR-ACNC: NEGATIVE — SIGNIFICANT CHANGE UP
NITRITE UR-MCNC: NEGATIVE — SIGNIFICANT CHANGE UP
PH UR: 6.5 — SIGNIFICANT CHANGE UP (ref 5–8)
PROT UR-MCNC: NEGATIVE — SIGNIFICANT CHANGE UP
SP GR SPEC: 1.02 — SIGNIFICANT CHANGE UP (ref 1–1.04)
UROBILINOGEN FLD QL: NORMAL — SIGNIFICANT CHANGE UP

## 2019-09-07 PROCEDURE — 99284 EMERGENCY DEPT VISIT MOD MDM: CPT

## 2019-09-07 RX ORDER — PSEUDOEPHEDRINE HCL 30 MG
1 TABLET ORAL
Qty: 7 | Refills: 0
Start: 2019-09-07

## 2019-09-07 RX ORDER — PSEUDOEPHEDRINE HCL 30 MG
30 TABLET ORAL ONCE
Refills: 0 | Status: COMPLETED | OUTPATIENT
Start: 2019-09-07 | End: 2019-09-07

## 2019-09-07 RX ORDER — IBUPROFEN 200 MG
1 TABLET ORAL
Qty: 15 | Refills: 0
Start: 2019-09-07 | End: 2019-09-11

## 2019-09-07 RX ORDER — KETOROLAC TROMETHAMINE 30 MG/ML
60 SYRINGE (ML) INJECTION ONCE
Refills: 0 | Status: DISCONTINUED | OUTPATIENT
Start: 2019-09-07 | End: 2019-09-07

## 2019-09-07 RX ADMIN — Medication 30 MILLIGRAM(S): at 19:36

## 2019-09-07 RX ADMIN — Medication 60 MILLIGRAM(S): at 19:21

## 2019-09-07 RX ADMIN — Medication 1 TABLET(S): at 19:21

## 2019-09-07 NOTE — ED ADULT NURSE NOTE - NSIMPLEMENTINTERV_GEN_ALL_ED
Implemented All Universal Safety Interventions:  [place] to call system. Call bell, personal items and telephone within reach. Instruct patient to call for assistance. Room bathroom lighting operational. Non-slip footwear when patient is off stretcher. Physically safe environment: no spills, clutter or unnecessary equipment. Stretcher in lowest position, wheels locked, appropriate side rails in place.

## 2019-09-07 NOTE — ED PROVIDER NOTE - OBJECTIVE STATEMENT
49 y.o female with a PMHx of HTN, HLD, DM, presents to the ED complaining of having chills along with sinus congestion and subjective fevers over the past few days. patient also states that she has been having occasional body aches. patient states that she attempted to take medications however did not have any relief. Pt states that since she has gotten sick now her daughter and multiple family members have gotten sick. Pt denies having any sore throat, CP, SOB, CAMPBELL, abdominal pain, nausea, vomiting, or diarrhea. Pt also mentioned that she had mild discomfort when urinating and wanted to check if she had a UTI.

## 2019-09-07 NOTE — ED ADULT NURSE NOTE - CHIEF COMPLAINT QUOTE
patient reports subjective fevers and chills x 1 week, when asked about the temperature she states it was "99 or 100". was seen at [facility] and given motrin. denies cough HA or NVD

## 2019-09-07 NOTE — ED PROVIDER NOTE - PATIENT PORTAL LINK FT
You can access the FollowMyHealth Patient Portal offered by [facility] by registering at the following website: [email or web address]. By joining [employer]’s FollowMyHealth portal, you will also be able to view your health information using other applications (apps) compatible with our system.

## 2019-09-07 NOTE — ED ADULT TRIAGE NOTE - CHIEF COMPLAINT QUOTE
patient reports subjective fevers and chills x 1 week, when asked about the temperature she states it was "99 or 100". was seen at [facility] and given motrin. denies cough HA or NVD

## 2019-09-07 NOTE — ED ADULT NURSE REASSESSMENT NOTE - GENERAL PATIENT STATE
comfortable appearance/improvement verbalized/states she feels alittle better, continues with body aches and sore throat. improvement verbalized/states she feels alittle better, continues with body aches./comfortable appearance

## 2019-09-07 NOTE — ED ADULT NURSE NOTE - OBJECTIVE STATEMENT
Received pt into spot #9 in intake area. Pt c/o chills, nasal congestion x 1 week. Denies any pain at present. Pt eval by HANNAH Vines. Awaiting orders.

## 2019-09-09 LAB
BACTERIA UR CULT: SIGNIFICANT CHANGE UP
SPECIMEN SOURCE: SIGNIFICANT CHANGE UP

## 2019-09-12 RX ORDER — IBUPROFEN 200 MG
1 TABLET ORAL
Qty: 15 | Refills: 0
Start: 2019-09-12 | End: 2019-09-16

## 2019-09-12 RX ORDER — PSEUDOEPHEDRINE HCL 30 MG
1 TABLET ORAL
Qty: 7 | Refills: 0
Start: 2019-09-12

## 2019-09-12 NOTE — ED POST DISCHARGE NOTE - REASON FOR FOLLOW-UP
Other Pt contacted ED.  Rx failed to send to pharmacy.  Prescriptions for Augmentin, Sudafed and IBU ERX to pharmacy.

## 2019-09-26 ENCOUNTER — APPOINTMENT (OUTPATIENT)
Dept: CARDIOLOGY | Facility: CLINIC | Age: 49
End: 2019-09-26

## 2020-02-18 ENCOUNTER — EMERGENCY (EMERGENCY)
Facility: HOSPITAL | Age: 50
LOS: 1 days | Discharge: HOME | End: 2020-02-18
Attending: STUDENT IN AN ORGANIZED HEALTH CARE EDUCATION/TRAINING PROGRAM | Admitting: EMERGENCY MEDICINE
Payer: MEDICAID

## 2020-02-18 VITALS
DIASTOLIC BLOOD PRESSURE: 87 MMHG | SYSTOLIC BLOOD PRESSURE: 127 MMHG | RESPIRATION RATE: 16 BRPM | OXYGEN SATURATION: 99 % | HEART RATE: 70 BPM | TEMPERATURE: 99 F

## 2020-02-18 DIAGNOSIS — R22.30 LOCALIZED SWELLING, MASS AND LUMP, UNSPECIFIED UPPER LIMB: Chronic | ICD-10-CM

## 2020-02-18 PROCEDURE — 99284 EMERGENCY DEPT VISIT MOD MDM: CPT

## 2020-02-18 RX ORDER — METOCLOPRAMIDE HCL 10 MG
10 TABLET ORAL ONCE
Refills: 0 | Status: COMPLETED | OUTPATIENT
Start: 2020-02-18 | End: 2020-02-18

## 2020-02-18 RX ORDER — ACETAMINOPHEN 500 MG
2 TABLET ORAL
Qty: 56 | Refills: 0
Start: 2020-02-18 | End: 2020-02-24

## 2020-02-18 RX ORDER — MECLIZINE HCL 12.5 MG
50 TABLET ORAL ONCE
Refills: 0 | Status: COMPLETED | OUTPATIENT
Start: 2020-02-18 | End: 2020-02-18

## 2020-02-18 RX ORDER — ACETAMINOPHEN 500 MG
975 TABLET ORAL ONCE
Refills: 0 | Status: COMPLETED | OUTPATIENT
Start: 2020-02-18 | End: 2020-02-18

## 2020-02-18 RX ORDER — IBUPROFEN 200 MG
1 TABLET ORAL
Qty: 28 | Refills: 0
Start: 2020-02-18 | End: 2020-02-24

## 2020-02-18 RX ORDER — SODIUM CHLORIDE 9 MG/ML
1000 INJECTION INTRAMUSCULAR; INTRAVENOUS; SUBCUTANEOUS ONCE
Refills: 0 | Status: COMPLETED | OUTPATIENT
Start: 2020-02-18 | End: 2020-02-18

## 2020-02-18 RX ORDER — MECLIZINE HCL 12.5 MG
1 TABLET ORAL
Qty: 15 | Refills: 0
Start: 2020-02-18 | End: 2020-02-22

## 2020-02-18 RX ADMIN — Medication 50 MILLIGRAM(S): at 17:39

## 2020-02-18 RX ADMIN — SODIUM CHLORIDE 1000 MILLILITER(S): 9 INJECTION INTRAMUSCULAR; INTRAVENOUS; SUBCUTANEOUS at 17:40

## 2020-02-18 RX ADMIN — Medication 975 MILLIGRAM(S): at 17:39

## 2020-02-18 RX ADMIN — Medication 10 MILLIGRAM(S): at 17:40

## 2020-02-18 NOTE — ED PROVIDER NOTE - NS ED ROS FT
GENERAL: No fever or chills  EYES: no change in vision  HEENT: see hpi  CARDIAC: no chest pain or palpitations   PULMONARY: no cough or SOB  GI: no abdominal pain, nausea, vomiting, diarrhea, or constipation   : No changes in urination  SKIN: no rashes  NEURO: see hpi  MSK: see hpi
116

## 2020-02-18 NOTE — ED PROVIDER NOTE - NSFOLLOWUPINSTRUCTIONS_ED_ALL_ED_FT
You were seen in the ED for dizziness  and left shoulder pain   The following labs/imaging were obtained: see attached (if applicable)  Take meclizine as instructed.   Take Acetaminophen (Tylenol 650mg each 6hrs) AND /OR Ibuprofen (Motrin 600mg each 6hrs) with meals for pain.  Return to the ED if you develop fever, slurred speech, weakness, worsening or new concerning symptoms.  Follow up with your primary care in 2-3 days and ENT and Ortho.   Discussed with pt results of work up, strict return precautions, and need for follow up.  Pt expressed understanding and agrees with plan. You were seen in the ED for dizziness  and left shoulder pain   The following labs/imaging were obtained: see attached (if applicable)  Take meclizine as instructed. DO NOT drive or operate heavy machinery while taking this medication.   Take Acetaminophen (Tylenol 650mg each 6hrs) AND /OR Ibuprofen (Motrin 600mg each 6hrs) with meals for pain.  Return to the ED if you develop fever, slurred speech, weakness, worsening or new concerning symptoms.  Follow up with your primary care in 2-3 days and ENT and Ortho.   Discussed with pt results of work up, strict return precautions, and need for follow up.  Pt expressed understanding and agrees with plan.

## 2020-02-18 NOTE — ED ADULT TRIAGE NOTE - CHIEF COMPLAINT QUOTE
Pt c/o dizziness x2 weeks with left arm pain x1 month.  Also c/o HA x2 weeks, coughing and sneezing x1 month.  Appears comfortable

## 2020-02-18 NOTE — ED PROVIDER NOTE - CLINICAL SUMMARY MEDICAL DECISION MAKING FREE TEXT BOX
51 yo F with DM, HLD, chronic back pain presents with headache with no neuro deficits or fever for 2 weeks, left shoulder pain s/p fall and diagnosed AC dissociation x1 wk , and dizziness for 2 weeks. Vitals WNL.  R sided lipoma (present for 10 years according to patient, not growing), Left shoulder pain worse on the anterior and medial aspect with no erythema, warmth or swelling, reduced flexion, otherwise nl ROM. Likely peripheral vertigo given nl neuro exam, duration of symptoms and precipitating factors. No concern for central vertigo. Shoulder pain likely secondary to AC dissociation, no concern for septic joint. Headache likely 2nd to migraines; no concern for SAH or meningitis. Will provide analgesia, anti-histamines, ortho f/u and re-kenisha

## 2020-02-18 NOTE — ED PROVIDER NOTE - PHYSICAL EXAMINATION
Gen: AAOx3, non-toxic  Head: NCAT  HEENT: EOMI, oral mucosa moist, normal conjunctiva  Lung: CTAB, no respiratory distress, no wheezes/rhonchi/rales B/L, speaking in full sentences  CV: RRR, no murmurs, rubs or gallops  Abd: soft, NTND, no guarding, no CVA tenderness  MSK: R sided lipoma (present for 10 years according to patient, not growing), Left shoulder pain worse on the anterior and medial aspect with no erythema, warmth or swelling, reduced flexion, otherwise nl ROM.   Neuro: No focal sensory or motor deficits, normal CN exam, nl finger to nose test. Able to walk on a straight line.   Skin: Warm, well perfused, no rash  Psych: normal affect.

## 2020-02-18 NOTE — ED PROVIDER NOTE - PROGRESS NOTE DETAILS
Reports improvement, dizziness improved, aware of need for ortho and ENT f/u. Patient instructed not to drive or operate heavy machinery while taking meclizine. Harman: Patient endorsed to me from Dr. Devi.  Patient noted to have improving headache with no neurologic complaints.  Denies nausea, or vomiting, or dizziness at this time.  Photophobia now improved.  NEURO: alert, CN 2-12 intact, reflexes nl, sensation nl, coordination nl, finger to nose nl, romberg negative, motor 5/5 RUE/LUE/RLE/LLE/EHL/Plantar flexion, no pronator drift, gait nl.  Pt instructed to follow up with orthopedics and neurology.

## 2020-02-18 NOTE — ED PROVIDER NOTE - ATTENDING CONTRIBUTION TO CARE
Destini Devi M.D: 50F hx DM, HLD, migraines p/w left shoulder pain chronic since fall one month ago diagnosed with AC separation at [facility] via xray, placed in sling which she has used intermittently, since then notes pain in shoulder, nonradiating, worse with movement, better with rest. no numbness/tingling/weakness in arm. also notes 2 weeks of dizziness and headache. headache described as diffuse, throbbing with photophobia and nausea, feels typical of her migraines but worse. also notes dizziness described as room spinning sensation only with movement. no symptoms at rest. no neck pain. no other complaints. has not taken anything for symptoms. on exam no midline ctl spine tenderness cn 2-12 itnact gross motor and sensory intact. 2+ radial pulse in LUE. ttp over shoulder joint, full ROM in left shoulder limited 2/2 pain. suspect shoulder pain is chronic from fall/ AC injury. no new trauma, no indication to reimage at this time. headache is likely 2/2 known migraines, no neuro deficits no concern for intracranial pathology, and has normal ct from 1 month ago. dizziness sounds peripheral-- no concern for central pathology at this time given symptoms only with movement. will give medication for symptom relief and reassess.

## 2020-02-18 NOTE — ED PROVIDER NOTE - PATIENT PORTAL LINK FT
You can access the FollowMyHealth Patient Portal offered by [facility] by registering at the following website: [email or web address]. By joining [employer]’s FollowMyHealth portal, you will also be able to view your health information using other applications (apps) compatible with our system.

## 2020-02-18 NOTE — ED PROVIDER NOTE - OBJECTIVE STATEMENT
49 yo F with DM, HLD, chronic back pain presents with headache with no neuro deficits or fever for 2 weeks, left shoulder pain s/p fall and diagnosed AC dissociation x1 wk , and dizziness for 2 weeks. Reports a blt throbbing diffuse headache with photophobia and phonophobia similar to previous migraines but more severe with no associated neurological deficits or vision changes. Reports intermittent dizziness lasting less than 1 minute induced by rapid head or body movements with no neuro deficits. + left ear tinnitus. Reports a worsening left shoulder pain after the fall with no fever, redness or swelling, took ibuprofen with no relief.

## 2020-02-19 NOTE — ED ADULT NURSE NOTE - NS ED NURSE RECORD ANOTHER VITAL SIGN
OPCM received a voicemail from [facility] stating [facility] was supposed to call patient's mother on 2/14 to notify her that patient can restart school on 2/18. She stated she would check in to see if they can call patient's mother again. OPCM called patient's mother to see if patient was able to start school as indicated. No answer, OPCM left a voicemail requesting a call back.    Yes, record another set of vital signs

## 2020-09-28 ENCOUNTER — EMERGENCY (EMERGENCY)
Facility: HOSPITAL | Age: 50
LOS: 1 days | Discharge: HOME | End: 2020-09-28
Attending: EMERGENCY MEDICINE | Admitting: EMERGENCY MEDICINE
Payer: COMMERCIAL

## 2020-09-28 VITALS
TEMPERATURE: 99 F | DIASTOLIC BLOOD PRESSURE: 86 MMHG | RESPIRATION RATE: 18 BRPM | HEIGHT: 64 IN | SYSTOLIC BLOOD PRESSURE: 134 MMHG | HEART RATE: 95 BPM | OXYGEN SATURATION: 100 %

## 2020-09-28 VITALS
RESPIRATION RATE: 18 BRPM | HEART RATE: 67 BPM | TEMPERATURE: 98 F | OXYGEN SATURATION: 100 % | DIASTOLIC BLOOD PRESSURE: 89 MMHG | SYSTOLIC BLOOD PRESSURE: 156 MMHG

## 2020-09-28 DIAGNOSIS — R22.30 LOCALIZED SWELLING, MASS AND LUMP, UNSPECIFIED UPPER LIMB: Chronic | ICD-10-CM

## 2020-09-28 LAB
ALBUMIN SERPL ELPH-MCNC: 4.5 G/DL — SIGNIFICANT CHANGE UP (ref 3.3–5)
ALP SERPL-CCNC: 55 U/L — SIGNIFICANT CHANGE UP (ref 40–120)
ALT FLD-CCNC: 20 U/L — SIGNIFICANT CHANGE UP (ref 4–33)
ANION GAP SERPL CALC-SCNC: 11 MMO/L — SIGNIFICANT CHANGE UP (ref 7–14)
APPEARANCE UR: CLEAR — SIGNIFICANT CHANGE UP
AST SERPL-CCNC: 17 U/L — SIGNIFICANT CHANGE UP (ref 4–32)
BASOPHILS # BLD AUTO: 0.05 K/UL — SIGNIFICANT CHANGE UP (ref 0–0.2)
BASOPHILS NFR BLD AUTO: 0.8 % — SIGNIFICANT CHANGE UP (ref 0–2)
BILIRUB SERPL-MCNC: < 0.2 MG/DL — LOW (ref 0.2–1.2)
BILIRUB UR-MCNC: NEGATIVE — SIGNIFICANT CHANGE UP
BLOOD UR QL VISUAL: NEGATIVE — SIGNIFICANT CHANGE UP
BUN SERPL-MCNC: 10 MG/DL — SIGNIFICANT CHANGE UP (ref 7–23)
CALCIUM SERPL-MCNC: 9.6 MG/DL — SIGNIFICANT CHANGE UP (ref 8.4–10.5)
CHLORIDE SERPL-SCNC: 100 MMOL/L — SIGNIFICANT CHANGE UP (ref 98–107)
CK SERPL-CCNC: 127 U/L — SIGNIFICANT CHANGE UP (ref 25–170)
CO2 SERPL-SCNC: 26 MMOL/L — SIGNIFICANT CHANGE UP (ref 22–31)
COLOR SPEC: COLORLESS — SIGNIFICANT CHANGE UP
CREAT SERPL-MCNC: 0.79 MG/DL — SIGNIFICANT CHANGE UP (ref 0.5–1.3)
EOSINOPHIL # BLD AUTO: 0.58 K/UL — HIGH (ref 0–0.5)
EOSINOPHIL NFR BLD AUTO: 8.8 % — HIGH (ref 0–6)
ERYTHROCYTE [SEDIMENTATION RATE] IN BLOOD: 23 MM/HR — SIGNIFICANT CHANGE UP (ref 4–25)
GLUCOSE SERPL-MCNC: 154 MG/DL — HIGH (ref 70–99)
GLUCOSE UR-MCNC: 500 — HIGH
HCT VFR BLD CALC: 37.8 % — SIGNIFICANT CHANGE UP (ref 34.5–45)
HGB BLD-MCNC: 12.7 G/DL — SIGNIFICANT CHANGE UP (ref 11.5–15.5)
IMM GRANULOCYTES NFR BLD AUTO: 0 % — SIGNIFICANT CHANGE UP (ref 0–1.5)
KETONES UR-MCNC: NEGATIVE — SIGNIFICANT CHANGE UP
LEUKOCYTE ESTERASE UR-ACNC: NEGATIVE — SIGNIFICANT CHANGE UP
LIDOCAIN IGE QN: 26.6 U/L — SIGNIFICANT CHANGE UP (ref 7–60)
LYMPHOCYTES # BLD AUTO: 2.88 K/UL — SIGNIFICANT CHANGE UP (ref 1–3.3)
LYMPHOCYTES # BLD AUTO: 43.8 % — SIGNIFICANT CHANGE UP (ref 13–44)
MCHC RBC-ENTMCNC: 30.7 PG — SIGNIFICANT CHANGE UP (ref 27–34)
MCHC RBC-ENTMCNC: 33.6 % — SIGNIFICANT CHANGE UP (ref 32–36)
MCV RBC AUTO: 91.3 FL — SIGNIFICANT CHANGE UP (ref 80–100)
MONOCYTES # BLD AUTO: 0.57 K/UL — SIGNIFICANT CHANGE UP (ref 0–0.9)
MONOCYTES NFR BLD AUTO: 8.7 % — SIGNIFICANT CHANGE UP (ref 2–14)
NEUTROPHILS # BLD AUTO: 2.5 K/UL — SIGNIFICANT CHANGE UP (ref 1.8–7.4)
NEUTROPHILS NFR BLD AUTO: 37.9 % — LOW (ref 43–77)
NITRITE UR-MCNC: NEGATIVE — SIGNIFICANT CHANGE UP
NRBC # FLD: 0 K/UL — SIGNIFICANT CHANGE UP (ref 0–0)
PH UR: 7 — SIGNIFICANT CHANGE UP (ref 5–8)
PLATELET # BLD AUTO: 227 K/UL — SIGNIFICANT CHANGE UP (ref 150–400)
PMV BLD: 10 FL — SIGNIFICANT CHANGE UP (ref 7–13)
POTASSIUM SERPL-MCNC: 4.1 MMOL/L — SIGNIFICANT CHANGE UP (ref 3.5–5.3)
POTASSIUM SERPL-SCNC: 4.1 MMOL/L — SIGNIFICANT CHANGE UP (ref 3.5–5.3)
PROT SERPL-MCNC: 7.5 G/DL — SIGNIFICANT CHANGE UP (ref 6–8.3)
PROT UR-MCNC: NEGATIVE — SIGNIFICANT CHANGE UP
RBC # BLD: 4.14 M/UL — SIGNIFICANT CHANGE UP (ref 3.8–5.2)
RBC # FLD: 11.9 % — SIGNIFICANT CHANGE UP (ref 10.3–14.5)
SODIUM SERPL-SCNC: 137 MMOL/L — SIGNIFICANT CHANGE UP (ref 135–145)
SP GR SPEC: 1.01 — SIGNIFICANT CHANGE UP (ref 1–1.04)
TROPONIN T, HIGH SENSITIVITY: < 6 NG/L — SIGNIFICANT CHANGE UP (ref ?–14)
UROBILINOGEN FLD QL: NORMAL — SIGNIFICANT CHANGE UP
WBC # BLD: 6.58 K/UL — SIGNIFICANT CHANGE UP (ref 3.8–10.5)
WBC # FLD AUTO: 6.58 K/UL — SIGNIFICANT CHANGE UP (ref 3.8–10.5)

## 2020-09-28 PROCEDURE — 93010 ELECTROCARDIOGRAM REPORT: CPT

## 2020-09-28 PROCEDURE — 71046 X-RAY EXAM CHEST 2 VIEWS: CPT | Mod: 26

## 2020-09-28 PROCEDURE — 99284 EMERGENCY DEPT VISIT MOD MDM: CPT | Mod: 25

## 2020-09-28 RX ORDER — KETOROLAC TROMETHAMINE 30 MG/ML
15 SYRINGE (ML) INJECTION ONCE
Refills: 0 | Status: DISCONTINUED | OUTPATIENT
Start: 2020-09-28 | End: 2020-09-28

## 2020-09-28 RX ORDER — ACETAMINOPHEN 500 MG
975 TABLET ORAL ONCE
Refills: 0 | Status: COMPLETED | OUTPATIENT
Start: 2020-09-28 | End: 2020-09-28

## 2020-09-28 RX ORDER — FAMOTIDINE 10 MG/ML
20 INJECTION INTRAVENOUS ONCE
Refills: 0 | Status: COMPLETED | OUTPATIENT
Start: 2020-09-28 | End: 2020-09-28

## 2020-09-28 RX ADMIN — FAMOTIDINE 20 MILLIGRAM(S): 10 INJECTION INTRAVENOUS at 14:45

## 2020-09-28 RX ADMIN — Medication 30 MILLILITER(S): at 14:45

## 2020-09-28 RX ADMIN — Medication 15 MILLIGRAM(S): at 17:00

## 2020-09-28 RX ADMIN — Medication 975 MILLIGRAM(S): at 16:20

## 2020-09-28 NOTE — ED PROVIDER NOTE - PATIENT PORTAL LINK FT
You can access the FollowMyHealth Patient Portal offered by [facility] by registering at the following website: [email or web address]. By joining [employer]’s FollowMyHealth portal, you will also be able to view your health information using other applications (apps) compatible with our system.

## 2020-09-28 NOTE — ED PROVIDER NOTE - ATTENDING CONTRIBUTION TO CARE
Dr Bloch, ATTENDING MD-  I performed a face to face bedside interview with patient regarding history of present illness, review of symptoms and past medical history. I completed an independent physical exam.  I have discussed patient's plan of care with PA.   Documentation as above in the note.   Patient examined, well appearing NAD, HEENT nml no jaundice, heart soundsnml lungs clear, abd soft nontender, mild diffuse muscle tenderness, no weakness or numbness, pulses intact, no edema. skin nml.

## 2020-09-28 NOTE — ED PROVIDER NOTE - NSFOLLOWUPINSTRUCTIONS_ED_ALL_ED_FT
Advance activity as tolerated.  Continue all previously prescribed medications as directed unless otherwise instructed.  Follow up with your primary care physician in 48-72 hours- bring copies of your results.  Return to the ER for worsening or persistent symptoms, and/or ANY NEW OR CONCERNING SYMPTOMS. If you have issues obtaining follow up, please call: [phone] to obtain a doctor or specialist who takes your insurance in your area.  You may call [phone] to make an appointment with the internal medicine clinic.

## 2020-09-28 NOTE — ED PROVIDER NOTE - MUSCULOSKELETAL NEGATIVE STATEMENT, MLM
no back pain, no gout, no musculoskeletal pain, no neck pain, and no weakness. no back pain, no gout, no neck pain, and no weakness. +body aches

## 2020-09-28 NOTE — ED PROVIDER NOTE - CLINICAL SUMMARY MEDICAL DECISION MAKING FREE TEXT BOX
51 y/o F pmh HTN, HLD, DM2, GERD c/o total body aches, generalized weakness x 2 weeks. Pt went to PCP about 9 days for same issue, noted hyperglycemia, pcp increased glipizide dose to 10mg. Has not taken anything for the pain. Denies fever, chills, cp, sob, abd pain, n/v/d, dysuria.    -labs, cxr, ekg

## 2020-09-28 NOTE — ED ADULT NURSE NOTE - NSIMPLEMENTINTERV_GEN_ALL_ED
Implemented All Universal Safety Interventions:  [place] to call system. Call bell, personal items and telephone within reach. Instruct patient to call for assistance. Room bathroom lighting operational. Non-slip footwear when patient is off stretcher. Physically safe environment: no spills, clutter or unnecessary equipment. Stretcher in lowest position, wheels locked, appropriate side rails in place.

## 2020-09-28 NOTE — ED PROVIDER NOTE - OBJECTIVE STATEMENT
51 y/o F pmh HTN, HLD, DM2, GERD c/o total body aches, generalized weakness x 2 weeks. Pt went to PCP about 9 days for same issue, noted hyperglycemia, pcp increased glipizide dose to 10mg. 51 y/o F pmh HTN, HLD, DM2, GERD c/o total body aches, generalized weakness x 2 weeks. Pt went to PCP about 9 days for same issue, noted hyperglycemia, pcp increased glipizide dose to 10mg. Has not taken anything for the pain. Denies fever, chills, cp, sob, abd pain, n/v/d, dysuria.

## 2020-09-28 NOTE — ED ADULT NURSE NOTE - OBJECTIVE STATEMENT
A&OX3 and ambulatory. Pt. has c/o generalized weakness and body aches for a few weeks. She states that she was recently diagnosed with hyperglycemia at her PCP, on PO medication no insulin. Denies N/V/D. IV 22G inserted LAC.

## 2020-09-28 NOTE — ED PROVIDER NOTE - PROGRESS NOTE DETAILS
HANNAH Hoffman: Pt feeling better. Advised to discuss with PCP to d/c statin use as this may be contributing to pt's myalgias

## 2020-11-04 ENCOUNTER — NON-APPOINTMENT (OUTPATIENT)
Age: 50
End: 2020-11-04

## 2020-11-04 ENCOUNTER — APPOINTMENT (OUTPATIENT)
Dept: CARDIOLOGY | Facility: CLINIC | Age: 50
End: 2020-11-04
Payer: COMMERCIAL

## 2020-11-04 VITALS
WEIGHT: 188 LBS | HEART RATE: 83 BPM | DIASTOLIC BLOOD PRESSURE: 72 MMHG | BODY MASS INDEX: 32.1 KG/M2 | SYSTOLIC BLOOD PRESSURE: 106 MMHG | TEMPERATURE: 97.7 F | OXYGEN SATURATION: 99 % | HEIGHT: 64 IN

## 2020-11-04 DIAGNOSIS — E78.00 PURE HYPERCHOLESTEROLEMIA, UNSPECIFIED: ICD-10-CM

## 2020-11-04 DIAGNOSIS — R06.02 SHORTNESS OF BREATH: ICD-10-CM

## 2020-11-04 DIAGNOSIS — R07.89 OTHER CHEST PAIN: ICD-10-CM

## 2020-11-04 PROCEDURE — 93306 TTE W/DOPPLER COMPLETE: CPT

## 2020-11-04 PROCEDURE — 93225 XTRNL ECG REC<48 HRS REC: CPT

## 2020-11-04 PROCEDURE — 99214 OFFICE O/P EST MOD 30 MIN: CPT

## 2020-11-04 PROCEDURE — 99072 ADDL SUPL MATRL&STAF TM PHE: CPT

## 2020-11-04 PROCEDURE — 93000 ELECTROCARDIOGRAM COMPLETE: CPT | Mod: 59

## 2020-11-04 RX ORDER — KETOTIFEN FUMARATE 0.25 MG/ML
0.03 SOLUTION/ DROPS OPHTHALMIC
Qty: 10 | Refills: 0 | Status: DISCONTINUED | COMMUNITY
Start: 2019-03-12 | End: 2020-11-04

## 2020-11-04 RX ORDER — EMTRICITABINE, RILPIVIRINE HYDROCHLORIDE, AND TENOFOVIR ALAFENAMIDE 200; 25; 25 MG/1; MG/1; MG/1
200-25-25 TABLET ORAL
Qty: 30 | Refills: 0 | Status: DISCONTINUED | COMMUNITY
Start: 2019-04-10 | End: 2020-11-04

## 2020-11-04 RX ORDER — GUAIFENESIN AND DEXTROMETHORPHAN HYDROBROMIDE 100; 10 MG/5ML; MG/5ML
100-10 SOLUTION ORAL
Qty: 240 | Refills: 0 | Status: DISCONTINUED | COMMUNITY
Start: 2019-04-29 | End: 2020-11-04

## 2020-11-04 RX ORDER — CLINDAMYCIN PHOSPHATE 1 G/10ML
1 GEL TOPICAL
Qty: 60 | Refills: 0 | Status: DISCONTINUED | COMMUNITY
Start: 2019-03-07 | End: 2020-11-04

## 2020-11-04 RX ORDER — APIXABAN 5 MG/1
5 TABLET, FILM COATED ORAL
Qty: 60 | Refills: 0 | Status: DISCONTINUED | COMMUNITY
Start: 2019-04-10 | End: 2020-11-04

## 2020-11-04 RX ORDER — GLIPIZIDE 2.5 MG/1
2.5 TABLET, FILM COATED, EXTENDED RELEASE ORAL
Qty: 30 | Refills: 0 | Status: DISCONTINUED | COMMUNITY
Start: 2019-03-22 | End: 2020-11-04

## 2020-11-04 RX ORDER — PNV NO.95/FERROUS FUM/FOLIC AC 28MG-0.8MG
325 TABLET ORAL DAILY
Refills: 0 | Status: ACTIVE | COMMUNITY

## 2020-11-04 RX ORDER — MULTIVITAMIN
TABLET ORAL DAILY
Refills: 0 | Status: ACTIVE | COMMUNITY

## 2020-11-04 RX ORDER — KETOCONAZOLE 20.5 MG/ML
2 SHAMPOO, SUSPENSION TOPICAL
Qty: 120 | Refills: 0 | Status: DISCONTINUED | COMMUNITY
Start: 2019-03-07 | End: 2020-11-04

## 2020-11-04 RX ORDER — CIPROFLOXACIN HYDROCHLORIDE 250 MG/1
250 TABLET, FILM COATED ORAL
Qty: 6 | Refills: 0 | Status: DISCONTINUED | COMMUNITY
Start: 2019-04-29 | End: 2020-11-04

## 2020-11-04 RX ORDER — PNV NO.95/FERROUS FUM/FOLIC AC 28MG-0.8MG
325 TABLET ORAL TWICE DAILY
Refills: 0 | Status: DISCONTINUED | COMMUNITY
End: 2020-11-04

## 2020-11-04 RX ORDER — NALOXEGOL OXALATE 25 MG/1
25 TABLET, FILM COATED ORAL
Qty: 30 | Refills: 0 | Status: DISCONTINUED | COMMUNITY
Start: 2019-04-10 | End: 2020-11-04

## 2020-11-04 RX ORDER — MELOXICAM 15 MG/1
15 TABLET ORAL
Qty: 30 | Refills: 0 | Status: DISCONTINUED | COMMUNITY
Start: 2019-03-06 | End: 2020-11-04

## 2020-11-04 RX ORDER — HYDROXYZINE HYDROCHLORIDE 25 MG/1
25 TABLET ORAL
Qty: 30 | Refills: 0 | Status: DISCONTINUED | COMMUNITY
Start: 2019-03-06 | End: 2020-11-04

## 2020-11-04 RX ORDER — IBUPROFEN 400 MG/1
400 TABLET, FILM COATED ORAL
Refills: 0 | Status: DISCONTINUED | COMMUNITY
End: 2020-11-04

## 2020-11-04 RX ORDER — BENZOYL PEROXIDE 5 G/100G
5 LIQUID TOPICAL
Qty: 148 | Refills: 0 | Status: DISCONTINUED | COMMUNITY
Start: 2019-03-07 | End: 2020-11-04

## 2020-11-04 RX ORDER — GUAIFENESIN 100 MG/5ML
100 LIQUID ORAL
Qty: 300 | Refills: 0 | Status: DISCONTINUED | COMMUNITY
Start: 2019-03-11 | End: 2020-11-04

## 2020-11-04 RX ORDER — METFORMIN ER 500 MG 500 MG/1
500 TABLET ORAL
Qty: 30 | Refills: 0 | Status: DISCONTINUED | COMMUNITY
Start: 2019-04-29 | End: 2020-11-04

## 2020-11-04 RX ORDER — GABAPENTIN 300 MG/1
300 CAPSULE ORAL DAILY
Refills: 0 | Status: ACTIVE | COMMUNITY

## 2020-11-04 NOTE — REVIEW OF SYSTEMS
[Blurry Vision] : blurred vision [Shortness Of Breath] : shortness of breath [Palpitations] : palpitations [Joint Pain] : joint pain [Dizziness] : dizziness [Numbness (Hypesthesia)] : numbness [Negative] : Endocrine [Seeing Double (Diplopia)] : no diplopia [Eye Pain] : no eye pain [Eyeglasses] : not currently wearing eyeglasses [Chest Pain] : no chest pain [Lower Ext Edema] : no extremity edema [Tremor] : no tremor was seen [Convulsions] : no convulsions [Tingling (Paresthesia)] : no tingling [Easy Bleeding] : no tendency for easy bleeding [Easy Bruising] : no tendency for easy bruising

## 2020-11-04 NOTE — HISTORY OF PRESENT ILLNESS
[FreeTextEntry1] : Nataly Portillo is a 50 year old female with history of hypercholesterolemia and diabetes comes for follow up visit. Complaining of mild shortness of breath on exertion along with mild chest pressure when she runs to catch bus which is chronic and getting worse recently and relieved with rest in few minutes. Also complaining of on and off palpitations, random onset, relieved by itself of few weeks duration. Does drink one cup of coffee. Compliant to medications and diet. Follows up with PCP.

## 2020-11-04 NOTE — REASON FOR VISIT
[Follow-Up - Clinic] : a clinic follow-up of [Chest Pain] : chest pain [Palpitations] : palpitations [FreeTextEntry1] : shortness of breath on exertion.

## 2020-11-04 NOTE — DISCUSSION/SUMMARY
[FreeTextEntry1] : In a summary MsRivka Nataly Tracy is a middle aged female with chronic  shortness of breath and chest pressure which is getting worse, previous stress test was negative for ischemia and diabetes, echo done showed normal LV systolic function. Will get Cardiac CT angiogram to rule out significant coronary blockages. Explained Ms. Tracy in detail. Wants think about it and let me know. Palpitations, stop drinking Coffee. 24 Hour Holter monitor to rule out significant arrhythmias. Further plan based on test results.

## 2020-11-10 ENCOUNTER — APPOINTMENT (OUTPATIENT)
Dept: CARDIOLOGY | Facility: CLINIC | Age: 50
End: 2020-11-10

## 2020-11-11 ENCOUNTER — NON-APPOINTMENT (OUTPATIENT)
Age: 50
End: 2020-11-11

## 2020-11-11 ENCOUNTER — APPOINTMENT (OUTPATIENT)
Dept: CARDIOLOGY | Facility: CLINIC | Age: 50
End: 2020-11-11
Payer: COMMERCIAL

## 2020-11-11 DIAGNOSIS — R00.2 PALPITATIONS: ICD-10-CM

## 2020-11-11 PROCEDURE — 93227 XTRNL ECG REC<48 HR R&I: CPT

## 2020-11-11 PROCEDURE — 99072 ADDL SUPL MATRL&STAF TM PHE: CPT

## 2021-05-18 ENCOUNTER — EMERGENCY (EMERGENCY)
Facility: HOSPITAL | Age: 51
LOS: 1 days | Discharge: HOME | End: 2021-05-18
Attending: STUDENT IN AN ORGANIZED HEALTH CARE EDUCATION/TRAINING PROGRAM | Admitting: STUDENT IN AN ORGANIZED HEALTH CARE EDUCATION/TRAINING PROGRAM
Payer: COMMERCIAL

## 2021-05-18 VITALS
SYSTOLIC BLOOD PRESSURE: 114 MMHG | HEART RATE: 100 BPM | HEIGHT: 64 IN | RESPIRATION RATE: 18 BRPM | OXYGEN SATURATION: 98 % | TEMPERATURE: 99 F | DIASTOLIC BLOOD PRESSURE: 78 MMHG

## 2021-05-18 VITALS
DIASTOLIC BLOOD PRESSURE: 77 MMHG | TEMPERATURE: 98 F | OXYGEN SATURATION: 100 % | SYSTOLIC BLOOD PRESSURE: 114 MMHG | RESPIRATION RATE: 17 BRPM | HEART RATE: 79 BPM

## 2021-05-18 DIAGNOSIS — R22.30 LOCALIZED SWELLING, MASS AND LUMP, UNSPECIFIED UPPER LIMB: Chronic | ICD-10-CM

## 2021-05-18 PROCEDURE — 73562 X-RAY EXAM OF KNEE 3: CPT | Mod: 26,LT

## 2021-05-18 PROCEDURE — 99283 EMERGENCY DEPT VISIT LOW MDM: CPT

## 2021-05-18 RX ORDER — ACETAMINOPHEN 500 MG
650 TABLET ORAL ONCE
Refills: 0 | Status: COMPLETED | OUTPATIENT
Start: 2021-05-18 | End: 2021-05-18

## 2021-05-18 RX ORDER — IBUPROFEN 200 MG
400 TABLET ORAL ONCE
Refills: 0 | Status: COMPLETED | OUTPATIENT
Start: 2021-05-18 | End: 2021-05-18

## 2021-05-18 RX ADMIN — Medication 400 MILLIGRAM(S): at 14:41

## 2021-05-18 RX ADMIN — Medication 650 MILLIGRAM(S): at 14:41

## 2021-05-18 NOTE — ED PROVIDER NOTE - NSFOLLOWUPINSTRUCTIONS_ED_ALL_ED_FT
Rest, ice, elevate area.  Take Motrin 600mg every 6-8 hrs with food for pain.  Follow up with PMD within 48-72 hrs. Follow up with the Orthopedist doctor within the week to discuss possible MRI vs. Physical therapy- referral list given.    Any worsening pain, swelling, weakness, numbness or any NEW CONCERNING symptoms return to the Emergency Dept.

## 2021-05-18 NOTE — ED PROVIDER NOTE - OBJECTIVE STATEMENT
50 y/o female with pmhx of DM not on insulin, presents to ED c/o atraumatic acute on chronic left knee pain x 2 days. History of knee pain in the past and was told she has "arthritis." Pt has PMD. Has not followed up with ortho nor had xr before. Pt states she also has left ear pain x 1 week. Pt denies any fever, chills, ear discharge, tinnitus, dizziness, weakness, numbness, tingling, calf pain.

## 2021-05-18 NOTE — ED PROVIDER NOTE - PATIENT PORTAL LINK FT
You can access the FollowMyHealth Patient Portal offered by [facility] by registering at the following website: [email or web address]. By joining [employer]’s FollowMyHealth portal, you will also be able to view your health information using other applications (apps) compatible with our system.

## 2021-05-18 NOTE — ED PROVIDER NOTE - MUSCULOSKELETAL, MLM
Spine appears normal, range of motion is not limited. Left knee without swelling or bony tenderness, full rom, no calf tenderness. Dp pulse 2+ sensation intact

## 2021-05-18 NOTE — ED ADULT NURSE NOTE - OBJECTIVE STATEMENT
pt received to intake 1, aox4.  pt c/o left knee pain since she woke up this am.  pt denies trauma.  no swelling noted.  pt has trouble weight bearing.  pt is able to transfer from stretcher to wheelchair.  awaiting further orders

## 2021-05-18 NOTE — ED PROVIDER NOTE - PROGRESS NOTE DETAILS
HANNAH Serra- pt pain well controlled. ambulatory. will dc home with ortho follow up pt amenable w/ plan

## 2021-05-18 NOTE — ED PROVIDER NOTE - CLINICAL SUMMARY MEDICAL DECISION MAKING FREE TEXT BOX
52 y/o female with pmhx of DM not on insulin, presents to ED c/o atraumatic acute on chronic left knee pain x 2 days. History of knee pain in the past and was told she has "arthritis." Pt has PMD. Has not followed up with ortho nor had xr before. Pt states she also has left ear pain x 1 week. Pt denies any fever, chills, ear discharge, tinnitus, dizziness, weakness, numbness, tingling, calf pain. on exam pt well appearing NAD ambulatory, no bony tenderness full rom, plan to check knee xr provide tylenol and advil, refer to ortho outpatient. ENT exam unremarkable no signs of infection will refer outpatient. pt amenable w/ plan

## 2021-05-18 NOTE — ED ADULT TRIAGE NOTE - CHIEF COMPLAINT QUOTE
pt coming with chronic kira. knee pain >left knee worse x few days, denies trauma.    Pt ambulatory to ER.     [id number] prior to arrival.

## 2021-05-18 NOTE — ED PROVIDER NOTE - ATTENDING CONTRIBUTION TO CARE
Yemi BRYANT: I agree with the above provided history and exam and addend/modify it as follows.    51F w/ pmh DM - p/w L knee pain x 1 wk, worse the past 2 days, but has also had similar pain the past and was told she has arthritis. Pt denies leg swelling. Pt also reports left ear, neck, and shoulder pain x 1 week. No fever, n/v/d/c, chest / abd pain, cough, sob, dizziness, dysuria/hematuria. No recent travel, medication change, illness, or hospitalization.     *GEN:   comfortable, in no acute distress, AOx3  *EYES:   pupils equally round and reactive to light, extra-occular movements intact  *HEENT:   airway patent  *CV:   regular rate and rhythm  *RESP:   clear to auscultation bilaterally, non-labored  *ABD:   soft, non-tender  *:   no cva/flank tenderness  *EXTREM:   no MSK tenderness, full ROM throughout including L  knee, no leg swelling  *SKIN:   dry, intact  *NEURO:   AOx3, no focal weakness or loss of sensation     Plan to check screening XR knee, although unlikely fracture/dislocation. Sx more likely 2/2 arthritis vs other msk etiology. ear exam wnl; plan to give pain meds, dc w/ close outpt f/u    I Juan Bragg MD performed a history and physical exam of the patient and discussed their management with the resident and /or advanced care provider. I reviewed the resident and /or ACP's note and agree with the documented findings and plan of care. My medical decision making and observations are found above.

## 2021-05-18 NOTE — ED PROVIDER NOTE - SKIN, MLM
Skin normal color for race, warm, dry and intact. No evidence of rash. attending Psychiatrist without NP/Trainee

## 2021-05-18 NOTE — ED ADULT NURSE NOTE - CHIEF COMPLAINT QUOTE
pt coming with chronic kira. knee pain >left knee worse x few days, denies trauma.    Pt ambulatory to ER.     QG=[id number] prior to arrival.

## 2021-05-18 NOTE — ED PROVIDER NOTE - ENMT, MLM
Airway patent, Nasal mucosa clear. Mouth with normal mucosa. Throat has no vesicles, no oropharyngeal exudates and uvula is midline. Ears: TM grey b/l, no discharge, no erythema. No tenderness, no swelling.

## 2021-07-19 NOTE — ED CDU PROVIDER NOTE - NEUROLOGICAL SPEECH
Medial Branch Nerve Block    Medial branch nerve block is a procedure to numb the nerves that supply the joints between your spinal bones (facet joints). The facet joints are located on the back of your spine. You may have the procedure on your neck or your upper, middle, or lower spine.  During this procedure, your health care provider will inject a numbing medicine (local anesthetic) around the medial nerves near the facet joint being treated. If more than one facet joint is causing pain, you may have more than one injection. In most cases, an anti-inflammatory medicine (steroid) will also be injected. You may need this procedure if:  · You have back pain from wear and tear (osteoarthritis) of your facet joint.  · You have an injury to a facet joint.  · Your health care provider wants to diagnose a facet joint as the cause of your pain. If the procedure relieves the pain, this indicates that the facet joint was the cause.  The local anesthetic will relieve pain for several days. The steroid may continue to relieve pain for several weeks. If your pain returns when the medicines wear off, this procedure may be repeated.  Tell a health care provider about:  · Any allergies you have.  · All medicines you are taking, including vitamins, herbs, eye drops, creams, and over-the-counter medicines.  · Any problems you or family members have had with anesthetic medicines.  · Any blood disorders you have.  · Any surgeries you have had.  · Any medical conditions you have.  · Whether you are pregnant or may be pregnant.  What are the risks?  Generally, this is a safe procedure. However, problems may occur, including:  · Infection.  · Bleeding.  · Allergic reactions to medicines or dyes.  · Damage to other structures or organs.  · Injection of the anesthetic into a blood vessel, which may decrease blood supply to your spinal cord and cause damage.  · Spread of the anesthetic to nearby nerves, which may cause temporary weakness  or numbness.  What happens before the procedure?  · Ask your health care provider about:  ? Changing or stopping your regular medicines. This is especially important if you are taking diabetes medicines or blood thinners.  ? Taking medicines such as aspirin and ibuprofen. These medicines can thin your blood. Do not take these medicines unless your health care provider tells you to take them.  ? Taking over-the-counter medicines, vitamins, herbs, and supplements.  · Plan to have someone take you home from the hospital or clinic.  · Follow instructions from your health care provider about any eating or drinking restrictions before the procedure.  · Ask your health care provider what steps will be taken to help prevent infection. These may include:  ? Removing hair at the injection site.  ? Washing skin with a germ-killing soap.  What happens during the procedure?  · An IV may be inserted into one of your veins.  · You will be given one or more of the following:  ? A medicine to help you relax (sedative).  ? A medicine to numb the area (local anesthetic). Your health care provider will feel for the facet joint or joints that are causing pain and inject a short-acting local anesthetic into the skin over the joint or joints.  · Your health care provider will then pass a needle into the area around the facet joint.  · Your health care provider may use a type of X-ray (fluoroscopy) to look at images of your spinal cord. If so, the health care provider will inject a small amount of dye into the facet joint area. The dye will show up on fluoroscopy and help locate the exact area to inject the long-acting anesthetic.  · The medicine will then be injected. Along with the long-acting anesthetic, a steroid medicine may also be injected.  · The needle will be removed, and a bandage will be placed over the injection site.  The procedure may vary among health care providers and hospitals.  What can I expect after the  procedure?  · Your blood pressure, heart rate, breathing rate, and blood oxygen level will be monitored until you leave the hospital or clinic.  · You should feel less pain in your back.  · You may have some soreness around the injection site.  Follow these instructions at home:  Injection site care  · Leave your bandage on for 24 hours.  · Do not take baths, swim, or use a hot tub until your health care provider approves.  · Check your injection site every day for signs of infection. Check for:  ? Redness, swelling, or pain.  ? Fluid or blood.  ? Warmth.  ? Pus or a bad smell.  · If directed, put ice on the injection area:  ? Put ice in a plastic bag.  ? Place a towel between your skin and the bag.  ? Leave the ice on for 20 minutes, 2-3 times a day.  General instructions  · Take over-the-counter and prescription medicines only as told by your health care provider.  · Do not drive for 24 hours if you were given a sedative during the procedure.  · Return to your normal activities as told by your health care provider. Ask your health care provider what activities are safe for you.  · Keep a log of your pain after the procedure. Keep track of how much pain you have and when you have it. This will help your health care provider plan your future treatment.  ? You should have relief of pain from the anesthetic for up to 3 days.  ? After that you may notice some pain again until the steroid starts to help. Pain relief from the steroid may last for a few weeks.  · Keep all follow-up visits as told by your health care provider. This is important.  Contact your health care provider if:  · Your pain is not relieved or gets worse at home.  · You have a fever or chills.  · You have any signs of infection.  · You develop any numbness or weakness.  Summary  · Medial branch nerve block is a procedure to numb the nerves that supply the joints between your spinal bones (facet joint).  · You may have the procedure on your neck or  "your upper, middle, or lower spine.  · This procedure may be done both to diagnose and relieve facet joint pain.  · A long-acting local anesthetic is injected close to the nerve that supplies the facet joint. An anti-inflammatory medicine (steroid) will also be injected.  This information is not intended to replace advice given to you by your health care provider. Make sure you discuss any questions you have with your health care provider.  Document Revised: 04/10/2020 Document Reviewed: 08/22/2019  [employer] Patient Education © 2021 [employer]      ----    -------  Education about Medial Branch Blockade and RF Therapy:    This medial branch blockade (MBB) suggested is intended for diagnostic purposes, with the intent of offering the patient Radiofrequency thermal rhizotomy (RF) if the MBB is diagnostically effective.  The diagnostic blockade is necessary to determine the likelihood that RF therapy could be efficacious in providing long term relief to the patient.    Medial branches are sensory nerve branches that connect to a facet joint and transmit sensations & pain signals from that joint.  Facet is a term for the type of joints found in the spine.  Medial branches are the nerves that go to a facet, and therefore are also sometimes called \"facet joint nerves\" (FJNs).      In a medial branch blockade procedure, xray fluoroscopy is used to verify the locations of the outside of the joint lines which are being targeted.  Under xray guidance, needles are placed to these areas.  Contrast dye is injected to confirm proper placement, with dye flowing over the joint area, and to ensure that the dye does not flow into unintended areas such as a vein.  When this is confirmed, local anesthetic is injected to block the medial branch at that joint level.      If MBBs are diagnostically successful in blocking pain, then the patient is most likely a great candidate for Radiofrequency of those facet joint nerves.  In the RF " procedure, needles are placed to the joint lines in the same fashion, and after testing, the needle tips are heated to thermally treat the nerves, blocking the nerves by in essence damaging the nerves with the heat treatment.       Medically, a successful RF procedure should provide a patient with 50% pain relief or more for at least 6 months.  Clinical experience suggests that successful patients receive relief more in the range of 12 months on average.  We also discussed that a fortunate minority of patients receive therapeutic success from the MBB, and may not require RF ablation.  If a patient receives more than 8 weeks of relief from MBB, then occasional repeat MBB for therapeutic purposes is a very reasonable alternative therapy.  This course of therapy is consistent with our LCDs according to our CMS  in the area, and therefore other insurance providers should follow accordingly.  We will monitor our patients to screen for these therapeutic responders and will offer RF therapy only when necessary.        We discussed that MBB & RF are not without risks.  Guidelines regarding anticoagulant use & neuraxial procedures will be respected.  Patients that are ill or otherwise may be at risk for sepsis will not have their spines accessed by neuraxial injections of any type.  This patient will not be offered these therapies if there is an increased risk.   We discussed that there is a risk of postprocedural pain and also a risk of worsening of clinical picture with these procedures as with any neuraxial procedure.    -------         clear

## 2021-09-20 NOTE — ED ADULT TRIAGE NOTE - HEART RATE (BEATS/MIN)
Spoke with patient's wife Phuong about earlier appointment with Dr. Aquino. Offered 09/22 at 10:20. Phuong declined as she stated they are attempting to see if they are able to be be seen at another facility and would like to keep appointment on 10/04 if that was to fall through. Provided ENT clinic number for any further questions/concerns.  
[phone]

## 2021-11-04 NOTE — ED PROVIDER NOTE - TEMPLATE
Quality 431: Preventive Care And Screening: Unhealthy Alcohol Use - Screening: Patient not identified as an unhealthy alcohol user when screened for unhealthy alcohol use using a systematic screening method
Detail Level: Detailed
Orthopedic
Quality 265: Biopsy Follow-Up: Biopsy results reviewed, communicated, tracked, and documented
Quality 130: Documentation Of Current Medications In The Medical Record: Current Medications Documented
Quality 128: Preventive Care And Screening: Body Mass Index (Bmi) Screening And Follow-Up Plan: BMI is documented above normal parameters and a follow-up plan is documented
Quality 402: Tobacco Use And Help With Quitting Among Adolescents: Patient screened for tobacco and never smoked
Quality 110: Preventive Care And Screening: Influenza Immunization: Influenza Immunization Administered during Influenza season

## 2021-12-28 ENCOUNTER — EMERGENCY (EMERGENCY)
Facility: HOSPITAL | Age: 51
LOS: 1 days | Discharge: HOME | End: 2021-12-28
Admitting: HOSPITALIST
Payer: COMMERCIAL

## 2021-12-28 VITALS
RESPIRATION RATE: 18 BRPM | HEART RATE: 102 BPM | OXYGEN SATURATION: 100 % | HEIGHT: 64 IN | TEMPERATURE: 99 F | SYSTOLIC BLOOD PRESSURE: 122 MMHG | DIASTOLIC BLOOD PRESSURE: 80 MMHG

## 2021-12-28 DIAGNOSIS — R22.30 LOCALIZED SWELLING, MASS AND LUMP, UNSPECIFIED UPPER LIMB: Chronic | ICD-10-CM

## 2021-12-28 PROCEDURE — 99283 EMERGENCY DEPT VISIT LOW MDM: CPT

## 2021-12-28 RX ORDER — ACETAMINOPHEN 500 MG
975 TABLET ORAL ONCE
Refills: 0 | Status: COMPLETED | OUTPATIENT
Start: 2021-12-28 | End: 2021-12-28

## 2021-12-28 RX ADMIN — Medication 975 MILLIGRAM(S): at 23:56

## 2021-12-28 NOTE — ED PROVIDER NOTE - OBJECTIVE STATEMENT
50 y/o female with a hx of DM, HLD, presents to the ER requesting a COVID test.  Pt states she was near someone who is COVID positive.  Pt reports 1 day of bodyaches.  Pt denies chest pain, sob, cough, congestion, nausea, vomiting, abdominal pain.

## 2021-12-28 NOTE — ED PROVIDER NOTE - NSICDXPASTMEDICALHX_GEN_ALL_CORE_FT
PAST MEDICAL HISTORY:  CAD (coronary artery disease) (Pt states she was told she had an MI in the past; denies hx/o stent or other interventional procedure)    Chronic back pain (thoracic)    CVA (cerebral vascular accident)     Diabetes     Disc disorder (herniated discs to back/?neck)    Hyperlipidemia, unspecified hyperlipidemia type

## 2021-12-28 NOTE — ED PROVIDER NOTE - CLINICAL SUMMARY MEDICAL DECISION MAKING FREE TEXT BOX
50 y/o female with a hx of DM, HLD, presents to the ER requesting a COVID test.  Pt states she was near someone who is COVID positive. Pt is well appearing, NAD, will give Tylenol, COVID swab.

## 2021-12-28 NOTE — ED PROVIDER NOTE - NSICDXFAMILYHX_GEN_ALL_CORE_FT
FAMILY HISTORY:  Father  Still living? Unknown  Family history of diabetes mellitus (DM), Age at diagnosis: Age Unknown  Family history of hypertension, Age at diagnosis: Age Unknown    Mother  Still living? Unknown  Family history of breast cancer, Age at diagnosis: Age Unknown  Family history of diabetes mellitus (DM), Age at diagnosis: Age Unknown

## 2021-12-28 NOTE — ED PROVIDER NOTE - PATIENT PORTAL LINK FT
You can access the FollowMyHealth Patient Portal offered by [facility] by registering at the following website: [email or web address]. By joining [employer]’s FollowMyHealth portal, you will also be able to view your health information using other applications (apps) compatible with our system.

## 2021-12-28 NOTE — ED ADULT TRIAGE NOTE - CHIEF COMPLAINT QUOTE
Pt st" I want Covid test...someone came to my house yesterday morning  and was Positive...I have body pains...today."

## 2021-12-29 LAB
FLUAV AG NPH QL: SIGNIFICANT CHANGE UP
FLUBV AG NPH QL: SIGNIFICANT CHANGE UP
RSV RNA NPH QL NAA+NON-PROBE: SIGNIFICANT CHANGE UP
SARS-COV-2 RNA SPEC QL NAA+PROBE: DETECTED

## 2022-01-01 NOTE — ED PROVIDER NOTE - DOMESTIC TRAVEL HIGH RISK QUESTION
No
clitoris and vaginal anatomy normal, absent significant discharge or tags; no masses; no hernias.

## 2022-03-11 ENCOUNTER — EMERGENCY (EMERGENCY)
Facility: HOSPITAL | Age: 52
LOS: 1 days | Discharge: HOME | End: 2022-03-11
Attending: EMERGENCY MEDICINE | Admitting: EMERGENCY MEDICINE
Payer: COMMERCIAL

## 2022-03-11 VITALS
RESPIRATION RATE: 16 BRPM | DIASTOLIC BLOOD PRESSURE: 82 MMHG | HEIGHT: 64 IN | HEART RATE: 88 BPM | TEMPERATURE: 99 F | OXYGEN SATURATION: 99 % | SYSTOLIC BLOOD PRESSURE: 128 MMHG

## 2022-03-11 DIAGNOSIS — R22.30 LOCALIZED SWELLING, MASS AND LUMP, UNSPECIFIED UPPER LIMB: Chronic | ICD-10-CM

## 2022-03-11 LAB
ALBUMIN SERPL ELPH-MCNC: 4.5 G/DL — SIGNIFICANT CHANGE UP (ref 3.3–5)
ALP SERPL-CCNC: 62 U/L — SIGNIFICANT CHANGE UP (ref 40–120)
ALT FLD-CCNC: 25 U/L — SIGNIFICANT CHANGE UP (ref 4–33)
ANION GAP SERPL CALC-SCNC: 12 MMOL/L — SIGNIFICANT CHANGE UP (ref 7–14)
AST SERPL-CCNC: 26 U/L — SIGNIFICANT CHANGE UP (ref 4–32)
BILIRUB SERPL-MCNC: <0.2 MG/DL — SIGNIFICANT CHANGE UP (ref 0.2–1.2)
BUN SERPL-MCNC: 6 MG/DL — LOW (ref 7–23)
CALCIUM SERPL-MCNC: 9.8 MG/DL — SIGNIFICANT CHANGE UP (ref 8.4–10.5)
CHLORIDE SERPL-SCNC: 102 MMOL/L — SIGNIFICANT CHANGE UP (ref 98–107)
CO2 SERPL-SCNC: 23 MMOL/L — SIGNIFICANT CHANGE UP (ref 22–31)
CREAT SERPL-MCNC: 0.68 MG/DL — SIGNIFICANT CHANGE UP (ref 0.5–1.3)
EGFR: 104 ML/MIN/1.73M2 — SIGNIFICANT CHANGE UP
GLUCOSE SERPL-MCNC: 113 MG/DL — HIGH (ref 70–99)
HCT VFR BLD CALC: 38.5 % — SIGNIFICANT CHANGE UP (ref 34.5–45)
HGB BLD-MCNC: 13 G/DL — SIGNIFICANT CHANGE UP (ref 11.5–15.5)
MCHC RBC-ENTMCNC: 31.6 PG — SIGNIFICANT CHANGE UP (ref 27–34)
MCHC RBC-ENTMCNC: 33.8 GM/DL — SIGNIFICANT CHANGE UP (ref 32–36)
MCV RBC AUTO: 93.4 FL — SIGNIFICANT CHANGE UP (ref 80–100)
NRBC # BLD: 0 /100 WBCS — SIGNIFICANT CHANGE UP
NRBC # FLD: 0 K/UL — SIGNIFICANT CHANGE UP
PLATELET # BLD AUTO: 266 K/UL — SIGNIFICANT CHANGE UP (ref 150–400)
POTASSIUM SERPL-MCNC: 4 MMOL/L — SIGNIFICANT CHANGE UP (ref 3.5–5.3)
POTASSIUM SERPL-SCNC: 4 MMOL/L — SIGNIFICANT CHANGE UP (ref 3.5–5.3)
PROT SERPL-MCNC: 7.4 G/DL — SIGNIFICANT CHANGE UP (ref 6–8.3)
RBC # BLD: 4.12 M/UL — SIGNIFICANT CHANGE UP (ref 3.8–5.2)
RBC # FLD: 12.2 % — SIGNIFICANT CHANGE UP (ref 10.3–14.5)
SODIUM SERPL-SCNC: 137 MMOL/L — SIGNIFICANT CHANGE UP (ref 135–145)
TROPONIN T, HIGH SENSITIVITY RESULT: <6 NG/L — SIGNIFICANT CHANGE UP
WBC # BLD: 7.22 K/UL — SIGNIFICANT CHANGE UP (ref 3.8–10.5)
WBC # FLD AUTO: 7.22 K/UL — SIGNIFICANT CHANGE UP (ref 3.8–10.5)

## 2022-03-11 PROCEDURE — 70498 CT ANGIOGRAPHY NECK: CPT | Mod: 26,ME

## 2022-03-11 PROCEDURE — 99285 EMERGENCY DEPT VISIT HI MDM: CPT

## 2022-03-11 PROCEDURE — G1004: CPT

## 2022-03-11 PROCEDURE — 70496 CT ANGIOGRAPHY HEAD: CPT | Mod: 26,MG

## 2022-03-11 RX ORDER — MECLIZINE HCL 12.5 MG
25 TABLET ORAL ONCE
Refills: 0 | Status: COMPLETED | OUTPATIENT
Start: 2022-03-11 | End: 2022-03-11

## 2022-03-11 RX ORDER — ACETAMINOPHEN 500 MG
975 TABLET ORAL ONCE
Refills: 0 | Status: COMPLETED | OUTPATIENT
Start: 2022-03-11 | End: 2022-03-11

## 2022-03-11 RX ORDER — IBUPROFEN 200 MG
400 TABLET ORAL ONCE
Refills: 0 | Status: COMPLETED | OUTPATIENT
Start: 2022-03-11 | End: 2022-03-11

## 2022-03-11 RX ADMIN — Medication 400 MILLIGRAM(S): at 22:48

## 2022-03-11 RX ADMIN — Medication 975 MILLIGRAM(S): at 22:48

## 2022-03-11 RX ADMIN — Medication 25 MILLIGRAM(S): at 20:44

## 2022-03-11 NOTE — ED PROVIDER NOTE - OBJECTIVE STATEMENT
52 year old pmh dm and cva presents with 1 week of left ear pain and now with dizziness that started today. complains of general weakness but no specific location.  dizziness described as room spinning. no fever no chest pain no cough or sob.

## 2022-03-11 NOTE — ED PROVIDER NOTE - PROGRESS NOTE DETAILS
Alejandro: Lab results unremarkable. Radiology results unremarkable (no change in empty sella). Alejandro: Lab results unremarkable. Radiology results unremarkable (no change in empty sella). Pt. describes exacerbation of her chronic total body pain (will give Toradol and short course of narcotics). Also, L ear pain, w/ exam findings as noted in PE.

## 2022-03-11 NOTE — ED PROVIDER NOTE - NEUROLOGICAL, MLM
Alert and oriented, no focal deficits, no motor or sensory deficits. cn 2-12 grossly intact. no focal weakness

## 2022-03-11 NOTE — ED ADULT TRIAGE NOTE - CHIEF COMPLAINT QUOTE
states" I am having left ear pain, body pain since few days" h/o CVA, CAD denies CP/ states" I am having left ear pain, body pain with general weakness since few days" h/o CVA, CAD denies CP/denies any neuro deficit

## 2022-03-11 NOTE — ED PROVIDER NOTE - PATIENT PORTAL LINK FT
You can access the FollowMyHealth Patient Portal offered by [facility] by registering at the following website: [email or web address]. By joining [employer]’s FollowMyHealth portal, you will also be able to view your health information using other applications (apps) compatible with our system.

## 2022-03-11 NOTE — ED PROVIDER NOTE - NS ED ROS FT
Alejandro: L ear: pinna and EAC unremarkable. Central part of TM (right down the middle where the malleus is) is erythematous with whitish near-vesicles or early vesicles forming.

## 2022-03-11 NOTE — ED PROVIDER NOTE - NSFOLLOWUPINSTRUCTIONS_ED_ALL_ED_FT
Follow with Endocrinology regarding "empty sella" (that is unchanged from prior). You may benefit from getting pituitary hormones checked.    Follow with ENT regarding dizziness/vertigo.    Take Meclizine as needed for relief of dizziness/vertigo.    Tylenol 500 mg (1 or 2 every 6 hours) and/or naproxen 500 mg (1 every 12 hours) for pain.     Return if symptoms worsen. Follow with Endocrinology regarding "empty sella" (that is unchanged from prior). You may benefit from getting pituitary hormones checked.    Follow with ENT regarding dizziness/vertigo.    Call [phone] and ask for an appointment at a convenient location.     Take Meclizine as needed for relief of dizziness/vertigo.    Tylenol 500 mg (1 or 2 every 6 hours) and/or naproxen 500 mg (1 every 12 hours) for pain.     Return if symptoms worsen.

## 2022-03-11 NOTE — ED PROVIDER NOTE - CLINICAL SUMMARY MEDICAL DECISION MAKING FREE TEXT BOX
concern for bppv vs menieres vs mastoiditis vs posterior cva. labs cta head and neck, symptom control, reassess

## 2022-03-12 VITALS
TEMPERATURE: 98 F | OXYGEN SATURATION: 96 % | SYSTOLIC BLOOD PRESSURE: 114 MMHG | HEART RATE: 71 BPM | DIASTOLIC BLOOD PRESSURE: 83 MMHG | RESPIRATION RATE: 18 BRPM

## 2022-03-12 RX ORDER — VALACYCLOVIR 500 MG/1
1000 TABLET, FILM COATED ORAL ONCE
Refills: 0 | Status: COMPLETED | OUTPATIENT
Start: 2022-03-12 | End: 2022-03-12

## 2022-03-12 RX ORDER — OXYCODONE HYDROCHLORIDE 5 MG/1
5 TABLET ORAL ONCE
Refills: 0 | Status: DISCONTINUED | OUTPATIENT
Start: 2022-03-12 | End: 2022-03-12

## 2022-03-12 RX ORDER — KETOROLAC TROMETHAMINE 30 MG/ML
15 SYRINGE (ML) INJECTION ONCE
Refills: 0 | Status: DISCONTINUED | OUTPATIENT
Start: 2022-03-12 | End: 2022-03-12

## 2022-03-12 RX ORDER — VALACYCLOVIR 500 MG/1
1 TABLET, FILM COATED ORAL
Qty: 30 | Refills: 0
Start: 2022-03-12 | End: 2022-03-21

## 2022-03-12 RX ORDER — MECLIZINE HCL 12.5 MG
1 TABLET ORAL
Qty: 25 | Refills: 0
Start: 2022-03-12

## 2022-03-12 RX ADMIN — OXYCODONE HYDROCHLORIDE 5 MILLIGRAM(S): 5 TABLET ORAL at 02:25

## 2022-03-12 RX ADMIN — VALACYCLOVIR 1000 MILLIGRAM(S): 500 TABLET, FILM COATED ORAL at 02:25

## 2022-03-12 RX ADMIN — Medication 15 MILLIGRAM(S): at 02:25

## 2022-03-12 NOTE — ED ADULT NURSE NOTE - CHIEF COMPLAINT QUOTE
states" I am having left ear pain, body pain with general weakness since few days" h/o CVA, CAD denies CP/denies any neuro deficit

## 2022-03-17 NOTE — ED ADULT TRIAGE NOTE - PAIN RATING/NUMBER SCALE (0-10): ACTIVITY
[facility] OFFICE VISIT NOTE    Patient ID:  Crys Segura [id number]     The patient is a  35 year old individual who is single     Date of Service:   3/17/2022    Chief Complaint:   Depression anxiety    Problem List:  Patient Active Problem List   Diagnosis   • Intractable chronic migraine without aura and without status migrainosus   • Medication overuse headache   • Fibromyalgia   • Cognitive complaints   • Chronic insomnia   • B12 deficiency; treated, corrected   • History of suicide attempt   • Myopia   • Diffuse pain   • Lyme disease   • S/P Botox injection   • Menstrual migraine without status migrainosus, not intractable   • Cervical radiculopathy at C7   • Other dysphagia   • Chronic fatigue and malaise   • Recurrent major depression resistant to treatment (CMS/HCC)       HPI:   Seen for phone follow-up because of COVID she is at home [facility]    Reports a flare of her Lyme she is trying to work on diet moods have been okay lots of anxiety about the situation [place] in family tends to watch a lot of TV is a a relative sore from [place]     Some situation anxiety does feel meds helpful she is not suicidal homicidal, coping functioning best she can    Past Psychiatric History:  Depression anxiety    Past Medical History:  Past Medical History:   Diagnosis Date   • Acne    • Allergy    • Anxiety    • Chronic fatigue and malaise 1/7/2019   • Depressive disorder    • Fibromyalgia    • Lyme disease    • Migraines     most without aura   • Suicide attempt (CMS/HCC) 2015       Family History:  family history includes * in her father; Cancer in her maternal grandfather, paternal grandfather, and paternal grandmother; Coronary Artery Disease in her father; Heart disease in her father; Hypertension in her maternal grandmother; Macular degeneration in her maternal grandmother; Migraine in her brother, father, and maternal uncle; Osteoporosis in her maternal  grandmother and mother; Other in her maternal grandmother; Parkinsonism in her father; Stroke in her maternal uncle, paternal grandfather, and paternal uncle.    Social History:  Social History     Social History Narrative    Single, currently living with her parents, no children, not employed, disabled temporarily due to chronic medication condition. Graduated from college with a degree in art/ radio/acting. Has a certificate in message therapy and has worked in past as a message therapist.      Single living with parents    Allergies:  ALLERGIES:   Allergen Reactions   • Cat Dander Other (See Comments)     Itchy eyes, eye puffiness, nasal congestion.   • Mirapex [Pramipexole] Other (See Comments)     Could not remember what the effects were   • Seasonal Other (See Comments)     Runny nose, nasal stuffiness, dry eyes.       Medications:  Current Outpatient Medications   Medication Sig Dispense Refill   • DULoxetine (CYMBALTA) 60 MG capsule Take 1 capsule by mouth daily. 90 capsule 2   • traZODone (DESYREL) 50 MG tablet Take 1 tablet by mouth nightly as needed for Sleep. 90 tablet 1   • topiramate (TOPAMAX) 25 MG tablet TAKE 3 TABLETS BY MOUTH TWICE DAILY 540 tablet 0   • sumatriptan (IMITREX) 100 MG tablet Take 1 tablet by mouth as needed for Migraine. 1 tab onset migr, may repeat 1 tab in 2 hr. Max 2 tab/24 hr. Max 2 to 3 d/wk. 10 tablet 0   • LORazepam (ATIVAN) 0.5 MG tablet Take 1 tablet by mouth 2 times daily as needed for Anxiety. 30 tablet 1   • hydrOXYzine (ATARAX) 25 MG tablet TAKE 1 TO 2 TABLETS BY MOUTH THREE TIMES DAILY AS NEEDED FOR MIGRAINE OR NAUSEA 60 tablet 1   • MAGNESIUM PO Take 1 capsule by mouth daily.     • DISPENSE Vitamin K2 and D3 one cap daily  0   • Cyanocobalamin (VITAMIN B-12) 2000 MCG Tab CR Take 2,000 mcg by mouth daily. 30 tablet    • cyclobenzaprine (FLEXERIL) 10 MG tablet Take 1 tablet by mouth daily as needed for Muscle spasms. 30 tablet 0   • Probiotic Product (PROBIOTIC PO) Take  by mouth daily. Probiotic     • DISPENSE Liposomal Glutathione for Herxing     • Omega-3 Fatty Acids (OMEGA ESSENTIALS BASIC PO) Take 1 tablet by mouth daily.      • cholecalciferol (VITAMIN D3) 1000 UNITS tablet Take 3,000 Units by mouth daily.      • Multiple Vitamins-Minerals (MULTIVITAMIN & MINERAL PO) Take 1 tablet by mouth daily.      • naproxen sodium (ALEVE) 220 MG tablet Take 220 mg by mouth as needed (headaches).       No current facility-administered medications for this visit.       Review of Systems:  Denies HA, blurry vision; no recent weight loss.  No chest pain, SOB or palpitations.  No cough, wheezing.  No N,V, D or constipation.  No seizures, extremity weakness or paresthesias.  Chronic pain headache  Mental Status Exam:    SPEECH: Normal rate and rhythm   MOOD: Euthymic mild anxiety dysphoria  AFFECT: Full range  THOUGHT PROCESS:  Logical without any formal thought disorder  THOUGHT CONTENT: Normal thought content without delusions, hallucinations or perceptual disturbances  PERCEPTION: Normal   CONSCIOUSNESS:  Normal level of consciousness  ORIENTATION:  Oriented x3  MEMORY:  IMMEDIATE: Intact                        RECENT: Intact                      REMOTE: Intact  ATTENTION:  Normal attention span  CONCENTRATION: normal  INSIGHT:  Intact  JUDGMENT:  Appropriate judgment  SUICIDAL IDEATIONS: No suicidal ideation  HOMICIDAL IDEATIONS: No homicidal ideation    Assessment:      PRIMARY DIAGNOSIS:  Major depression recurrent moderate    SECONDARY DIAGNOSIS:  Other mixed anxiety disorder        Plan:  Reviewed issue supportively encouraged her to avoid news as much as she can other possibly want today update, get outside walk her dog involve other activities continue current medications follow-up 3 months     Patient seen for 15 minutes phone visit     Patient consented phone visit    The patient processed the psychosocial stressors in therapy and showed awareness of discussed coping  strategies.      Jaswant Martel MD  3/17/2022   9

## 2022-04-26 ENCOUNTER — APPOINTMENT (OUTPATIENT)
Dept: OTOLARYNGOLOGY | Facility: CLINIC | Age: 52
End: 2022-04-26
Payer: COMMERCIAL

## 2022-04-26 VITALS
SYSTOLIC BLOOD PRESSURE: 126 MMHG | DIASTOLIC BLOOD PRESSURE: 72 MMHG | BODY MASS INDEX: 29.02 KG/M2 | HEIGHT: 64 IN | HEART RATE: 81 BPM | WEIGHT: 170 LBS

## 2022-04-26 DIAGNOSIS — H69.83 OTHER SPECIFIED DISORDERS OF EUSTACHIAN TUBE, BILATERAL: ICD-10-CM

## 2022-04-26 DIAGNOSIS — Z01.10 ENCOUNTER FOR EXAMINATION OF EARS AND HEARING W/OUT ABNORMAL FINDINGS: ICD-10-CM

## 2022-04-26 PROCEDURE — 31231 NASAL ENDOSCOPY DX: CPT

## 2022-04-26 PROCEDURE — 92567 TYMPANOMETRY: CPT

## 2022-04-26 PROCEDURE — 99204 OFFICE O/P NEW MOD 45 MIN: CPT | Mod: 25

## 2022-04-26 PROCEDURE — 92557 COMPREHENSIVE HEARING TEST: CPT

## 2022-04-26 NOTE — DATA REVIEWED
[de-identified] : Hearing Test performed to evaluate the extent of hearing loss and  to explain pt's symptoms\par today's hearing test was personally reviewed and revealed\par barbyl [de-identified] : see PACS reports\par Mild PNS inflammation\par Brain and bllod vesslas-wnl\par Left scalp cyst

## 2022-04-26 NOTE — PHYSICAL EXAM
[Nasal Endoscopy Performed] : nasal endoscopy was performed, see procedure section for findings [] : septum deviated to the left [Midline] : trachea located in midline position [Normal] : no rashes [de-identified] : congested  [FreeTextEntry2] : scalp cyst-benign

## 2022-04-26 NOTE — HISTORY OF PRESENT ILLNESS
[Ear Fullness] : ear fullness [Dizziness] : dizziness [Eustachian Tube Dysfunction] : eustachian tube dysfunction [Headache] : headache [Facial Pressure] : facial pressure [None] : No risk factors have been identified. [de-identified] : 51 yo female\par Patient complains of dizziness x 2 weeks. States she was walking and felt off balance lasted several minutes. Since then she feels like she is in a head fog. Dizziness comes in episodes, worse when she is walking fast or makes sharp movements. She has been getting intermittent headaches for about 3 years, has not been evaluated by neuro. Has nasal congestion and left clogged ear sensation as well. Pt has no ear pain, ear drainage, hearing loss, tinnitus, nasal discharge, epistaxis, sinus infections, facial pain, facial pressure, throat pain, dysphagia or fevers\par \par  [Hearing Loss] : no hearing loss [Allergic Rhinitis] : no allergic rhinitis [Early Onset Hearing Loss] : no early onset hearing loss [Stroke] : no stroke [Hyperthyroidism] : no hyperthyroidism [Sialadenitis] : no sialadenitis [Hodgkin Disease] : no hodgkin disease [Non-Hodgkin Lymphoma] : no non-hodgkin lymphoma [Graves Disease] : no graves disease [Thyroid Cancer] : no thyroid cancer

## 2022-04-26 NOTE — ASSESSMENT
[FreeTextEntry1] : Ms. BLACKBURN 52 year F complains of dizziness x 2 weeks, she feel like she is in a head fog. Always with nasal congestion and clogged ears. \par \par Vestibulopathy secondary to DNS and sinus congestion\par -Hearing Test performed to evaluate the extent of hearing loss and to explain pt's symptoms-wnl\par -Rx: Medrol dose pack \par -Hypertonic nasal saline spray advised \par \par DNS and Rhinitis\par Rx\par   Steam humidification and hypertonic saline nasal irrigations\par \par Headaches-poss 2/2 sinus congestion\par Rec acupuncture\par \par f/u prn and 2-4 weeks

## 2022-05-05 ENCOUNTER — NON-APPOINTMENT (OUTPATIENT)
Age: 52
End: 2022-05-05

## 2022-05-14 ENCOUNTER — EMERGENCY (EMERGENCY)
Facility: HOSPITAL | Age: 52
LOS: 1 days | Discharge: HOME | End: 2022-05-14
Attending: STUDENT IN AN ORGANIZED HEALTH CARE EDUCATION/TRAINING PROGRAM | Admitting: STUDENT IN AN ORGANIZED HEALTH CARE EDUCATION/TRAINING PROGRAM
Payer: COMMERCIAL

## 2022-05-14 VITALS
HEIGHT: 64 IN | SYSTOLIC BLOOD PRESSURE: 121 MMHG | TEMPERATURE: 97 F | DIASTOLIC BLOOD PRESSURE: 72 MMHG | OXYGEN SATURATION: 100 % | RESPIRATION RATE: 18 BRPM | HEART RATE: 88 BPM

## 2022-05-14 DIAGNOSIS — R22.30 LOCALIZED SWELLING, MASS AND LUMP, UNSPECIFIED UPPER LIMB: Chronic | ICD-10-CM

## 2022-05-14 PROCEDURE — 99285 EMERGENCY DEPT VISIT HI MDM: CPT

## 2022-05-14 NOTE — ED ADULT TRIAGE NOTE - CHIEF COMPLAINT QUOTE
swelling to neck    pt felt tightness to neck for 1 week but started having pain, swelling to neck and upper chest since yesterday.. left side >right.  slight throat tightness.  has raash to back for 1 week.  pmhx cva with right sided residual weakness, diabetes type 2, hi chol, lipoma right shoulder. swelling to neck    pt felt tightness to neck for 1 week but started having pain, swelling to neck and upper chest since yesterday.. left side >right.  slight throat tightness but no sob, stridor, drooling.  speaking in freely in full sentences.  has rash to back for 1 week.  pmhx cva with right sided residual weakness, diabetes type 2, hi chol, lipoma right shoulder.

## 2022-05-15 VITALS
DIASTOLIC BLOOD PRESSURE: 82 MMHG | SYSTOLIC BLOOD PRESSURE: 135 MMHG | OXYGEN SATURATION: 100 % | TEMPERATURE: 98 F | RESPIRATION RATE: 16 BRPM | HEART RATE: 85 BPM

## 2022-05-15 LAB
ALBUMIN SERPL ELPH-MCNC: 4.4 G/DL — SIGNIFICANT CHANGE UP (ref 3.3–5)
ALP SERPL-CCNC: 54 U/L — SIGNIFICANT CHANGE UP (ref 40–120)
ALT FLD-CCNC: 28 U/L — SIGNIFICANT CHANGE UP (ref 4–33)
ANION GAP SERPL CALC-SCNC: 11 MMOL/L — SIGNIFICANT CHANGE UP (ref 7–14)
AST SERPL-CCNC: 53 U/L — HIGH (ref 4–32)
BASOPHILS # BLD AUTO: 0.04 K/UL — SIGNIFICANT CHANGE UP (ref 0–0.2)
BASOPHILS NFR BLD AUTO: 0.5 % — SIGNIFICANT CHANGE UP (ref 0–2)
BILIRUB SERPL-MCNC: 0.2 MG/DL — SIGNIFICANT CHANGE UP (ref 0.2–1.2)
BUN SERPL-MCNC: 7 MG/DL — SIGNIFICANT CHANGE UP (ref 7–23)
CALCIUM SERPL-MCNC: 9.2 MG/DL — SIGNIFICANT CHANGE UP (ref 8.4–10.5)
CHLORIDE SERPL-SCNC: 98 MMOL/L — SIGNIFICANT CHANGE UP (ref 98–107)
CO2 SERPL-SCNC: 24 MMOL/L — SIGNIFICANT CHANGE UP (ref 22–31)
CREAT SERPL-MCNC: 0.76 MG/DL — SIGNIFICANT CHANGE UP (ref 0.5–1.3)
EGFR: 94 ML/MIN/1.73M2 — SIGNIFICANT CHANGE UP
EOSINOPHIL # BLD AUTO: 0.53 K/UL — HIGH (ref 0–0.5)
EOSINOPHIL NFR BLD AUTO: 6.6 % — HIGH (ref 0–6)
GLUCOSE SERPL-MCNC: 243 MG/DL — HIGH (ref 70–99)
HCT VFR BLD CALC: 39.3 % — SIGNIFICANT CHANGE UP (ref 34.5–45)
HGB BLD-MCNC: 13.7 G/DL — SIGNIFICANT CHANGE UP (ref 11.5–15.5)
IANC: 3.31 K/UL — SIGNIFICANT CHANGE UP (ref 1.8–7.4)
IMM GRANULOCYTES NFR BLD AUTO: 0.1 % — SIGNIFICANT CHANGE UP (ref 0–1.5)
LYMPHOCYTES # BLD AUTO: 3.66 K/UL — HIGH (ref 1–3.3)
LYMPHOCYTES # BLD AUTO: 45.8 % — HIGH (ref 13–44)
MCHC RBC-ENTMCNC: 32.6 PG — SIGNIFICANT CHANGE UP (ref 27–34)
MCHC RBC-ENTMCNC: 34.9 GM/DL — SIGNIFICANT CHANGE UP (ref 32–36)
MCV RBC AUTO: 93.6 FL — SIGNIFICANT CHANGE UP (ref 80–100)
MONOCYTES # BLD AUTO: 0.45 K/UL — SIGNIFICANT CHANGE UP (ref 0–0.9)
MONOCYTES NFR BLD AUTO: 5.6 % — SIGNIFICANT CHANGE UP (ref 2–14)
NEUTROPHILS # BLD AUTO: 3.31 K/UL — SIGNIFICANT CHANGE UP (ref 1.8–7.4)
NEUTROPHILS NFR BLD AUTO: 41.4 % — LOW (ref 43–77)
NRBC # BLD: 0 /100 WBCS — SIGNIFICANT CHANGE UP
NRBC # FLD: 0 K/UL — SIGNIFICANT CHANGE UP
PLATELET # BLD AUTO: 216 K/UL — SIGNIFICANT CHANGE UP (ref 150–400)
POTASSIUM SERPL-MCNC: 4.8 MMOL/L — SIGNIFICANT CHANGE UP (ref 3.5–5.3)
POTASSIUM SERPL-SCNC: 4.8 MMOL/L — SIGNIFICANT CHANGE UP (ref 3.5–5.3)
PROT SERPL-MCNC: 7.7 G/DL — SIGNIFICANT CHANGE UP (ref 6–8.3)
RBC # BLD: 4.2 M/UL — SIGNIFICANT CHANGE UP (ref 3.8–5.2)
RBC # FLD: 12.7 % — SIGNIFICANT CHANGE UP (ref 10.3–14.5)
SODIUM SERPL-SCNC: 133 MMOL/L — LOW (ref 135–145)
TSH SERPL-MCNC: 1.4 UIU/ML — SIGNIFICANT CHANGE UP (ref 0.27–4.2)
WBC # BLD: 8 K/UL — SIGNIFICANT CHANGE UP (ref 3.8–10.5)
WBC # FLD AUTO: 8 K/UL — SIGNIFICANT CHANGE UP (ref 3.8–10.5)

## 2022-05-15 PROCEDURE — 71046 X-RAY EXAM CHEST 2 VIEWS: CPT | Mod: 26

## 2022-05-15 PROCEDURE — 70491 CT SOFT TISSUE NECK W/DYE: CPT | Mod: 26,MA

## 2022-05-15 RX ORDER — KETOROLAC TROMETHAMINE 30 MG/ML
15 SYRINGE (ML) INJECTION ONCE
Refills: 0 | Status: DISCONTINUED | OUTPATIENT
Start: 2022-05-15 | End: 2022-05-15

## 2022-05-15 RX ORDER — OXYCODONE HYDROCHLORIDE 5 MG/1
5 TABLET ORAL ONCE
Refills: 0 | Status: DISCONTINUED | OUTPATIENT
Start: 2022-05-15 | End: 2022-05-15

## 2022-05-15 RX ADMIN — OXYCODONE HYDROCHLORIDE 5 MILLIGRAM(S): 5 TABLET ORAL at 00:42

## 2022-05-15 RX ADMIN — Medication 15 MILLIGRAM(S): at 01:32

## 2022-05-15 NOTE — ED PROVIDER NOTE - OBJECTIVE STATEMENT
52F PMH HTN, DM, CVA with residual R sided weakness presenting with 3 weeks of b/l neck swelling and pain (L>R), worsening over past few days. Pt states pain is worse with movement and lateral neck muscles are tender to touch. Denies throat swelling, sob, chest pain, palpitations. Denies n/v/d/c, abdominal pain, urinary symptoms. Denies fever, chills, weight loss. Pt reports hx of L sided headache that she has had for a few month and chronic back pain for which she has neurology appointment at the end of May.

## 2022-05-15 NOTE — ED ADULT NURSE NOTE - CHIEF COMPLAINT QUOTE
swelling to neck    pt felt tightness to neck for 1 week but started having pain, swelling to neck and upper chest since yesterday.. left side >right.  slight throat tightness but no sob, stridor, drooling.  speaking in freely in full sentences.  has rash to back for 1 week.  pmhx cva with right sided residual weakness, diabetes type 2, hi chol, lipoma right shoulder.

## 2022-05-15 NOTE — ED ADULT NURSE NOTE - OBJECTIVE STATEMENT
Pt arrives to room 15 c/o tightness to neck for 1 week but started having body pain, swelling to neck and upper chest since yesterday. Slight throat tightness but no sob, stridor, drooling.  Speaking in full sentences.  Has rash to back for 1 week.  pmhx cva with right sided residual weakness, diabetes type 2, hi chol, lipoma right shoulder.   Pt medicated as per EMAR for pain.  Float RN placing iv for lab draw.

## 2022-05-15 NOTE — ED PROVIDER NOTE - PHYSICAL EXAMINATION
GENERAL: Awake. Alert. NAD. Well nourished.  HEENT: NC/AT, PERRL, EOMI, Conjunctiva pink, no scleral icterus. Airway patent. Moist mucous membranes. No palpable thyroid nodules. No palpable lymphadenopathy.   LUNGS: CTAB. No wheezes or rales noted.  CARDIAC: Chest non-tender to palpation. RRR.  ABDOMEN:  No masses noted.  Soft, NT, ND, no rebound, no guarding.  BACK: No midline spinal tenderness. B/l cervical muscle hypertonicity and ttp.  EXT: No edema, no calf tenderness, distal pulses 2+ bilaterally  NEURO: A&Ox3. Moving all extremities. Sensation and strength intact throughout.   SKIN: Warm and dry. Dry scaling rash on back s/p lidocaine patches.  PSYCH: Normal affect.

## 2022-05-15 NOTE — ED PROVIDER NOTE - CLINICAL SUMMARY MEDICAL DECISION MAKING FREE TEXT BOX
52F PMH HTN, DM, CVA with residual R sided weakness presenting with 3 weeks of b/l neck swelling and pain (L>R), worsening over past few days. Given hx and physical, likely cervical muscle spasm vs strain. Will eval for goiter vs lymphadenopathy vs soft tissue mass though less likely given physical exam. Plan for labs, ct, will give pain medication for chronic back pain /headache and reassess

## 2022-05-15 NOTE — ED PROVIDER NOTE - NSPTACCESSSVCSAPPTDETAILS_ED_ALL_ED_FT
Patient need follow up with primary care doctor for physical therapy prescription and diabetes management.

## 2022-05-15 NOTE — ED PROVIDER NOTE - ATTENDING CONTRIBUTION TO CARE
51 yo f past medical history htn, dm, cva presents for approx 3 weeks of left sided neck pain and swelling. denies trauma, fever chills, diff swallow food/drink, sob, palpitations, cp, cough, n/v. denies new numbness or weakness. exam as above. Ddx includes, but not limited to, reactive LAD, thyroid enlargement, MSK. plan: labs, CT, symptom relief prn, reassess

## 2022-05-15 NOTE — ED PROVIDER NOTE - PROGRESS NOTE DETAILS
Lucretia Inman DO (PGY1): Lab results nonactionable. CT showing no mass in neck. Pt made aware of lab and imaging results. Questions regarding their symptoms were addressed. Advised to follow up with primary care doctor for physical therapy prescription. Given strict return precautions. Pt verbalized understanding.

## 2022-05-15 NOTE — ED PROVIDER NOTE - PATIENT PORTAL LINK FT
You can access the FollowMyHealth Patient Portal offered by [facility] by registering at the following website: [email or web address]. By joining [facility]’s FollowMyHealth portal, you will also be able to view your health information using other applications (apps) compatible with our system.

## 2022-05-18 ENCOUNTER — APPOINTMENT (OUTPATIENT)
Dept: PHYSICAL MEDICINE AND REHAB | Facility: CLINIC | Age: 52
End: 2022-05-18
Payer: COMMERCIAL

## 2022-05-18 VITALS
RESPIRATION RATE: 17 BRPM | BODY MASS INDEX: 32.78 KG/M2 | SYSTOLIC BLOOD PRESSURE: 110 MMHG | HEART RATE: 86 BPM | TEMPERATURE: 97.8 F | OXYGEN SATURATION: 98 % | DIASTOLIC BLOOD PRESSURE: 80 MMHG | WEIGHT: 192 LBS | HEIGHT: 64 IN

## 2022-05-18 DIAGNOSIS — M25.512 PAIN IN RIGHT SHOULDER: ICD-10-CM

## 2022-05-18 DIAGNOSIS — M25.511 PAIN IN RIGHT SHOULDER: ICD-10-CM

## 2022-05-18 PROCEDURE — 99204 OFFICE O/P NEW MOD 45 MIN: CPT

## 2022-05-22 NOTE — DATA REVIEWED
[MRI] : MRI [FreeTextEntry1] : 7/06/2019\par \par C1/2: There is mild arthrosis at the articulation of the dens with the \par anterior arch of C1. \par C2/C3: Normal \par C3/C4: Normal \par C4/C5: Mild to moderate circumferential disc bulging is present. Disc \par contacts the ventral aspect of the cord without cord signal abnormality. \par There is no foraminal narrowing. \par C5/C6: Normal \par C6/C7: There is a moderate to large central and right paracentral disc \par extrusion that contacts and mildly indents the right aspect of the cord. \par There is no cord signal abnormality. \par C7/T1: Normal \par \par cta 3.12.2022\par IMPRESSION:\par NONCONTRAST HEAD CT SCAN:\par 1. No CT evidence of acute intracranial pathology. Follow-up MRI is recommended to exclude a small acute infarct.\par 2. Incidentally noted is an empty sella and bilateral petrous apex cephaloceles, which are unchanged from MRI of 02/19/2019.\par \par CT ANGIOGRAPHY NECK: Patent cervical vasculature. No hemodynamically significant carotid stenosis or flow-limiting vertebral artery stenosis. No evidence of dissection.\par \par CT ANGIOGRAPHY BRAIN: No vessel occlusion, flow-limiting stenosis or aneurysm is identified about the [place] of [place].\par \par POSTCONTRAST HEAD CT SCAN: No intracranial mass lesion or abnormal enhancement. The dural venous sinuses are patent.\par \par \par ALIGNMENT: There is straightening of the normal cervical lordosis. \par CORD: No cord signal abnormality is identified. \par MARROW: There is no bone marrow edema or fracture. \par IMAGED BRAIN: Normal \par PERIPHERAL/NECK SOFT TISSUES: Normal \par \par IMPRESSION: Moderate to large central and right paracentral disc extrusion \par at C6-C7 and contacts and mildly indents the right aspect of the cord. \par \par Mild disc bulging at C4-C5 that contacts the ventral aspect of the cord \par without indentation. \par \par MRI of the right shoulder performed on 9/25/2016 reveals\par IMPRESSION: Prior rotator cuff surgery with a recurrent supraspinatus tear \par and moderate tendinosis of the infraspinatus with mild biceps tendinosis. \par Nonencapsulated lipoma within the lateral subcutaneous soft tissues of the \par shoulder without aggressive features.

## 2022-05-22 NOTE — HISTORY OF PRESENT ILLNESS
[FreeTextEntry1] : PCP Dr. Zana Christy [phone]\par [place], [place] [address]\par \par 52 year old female neck pain and shoulder pain.  She denies an accidental fall.\par \par Neck pain\par Pain:  8/10 Worse: 10/10 Quality: sharp  Frequency: constant\par The pain starts at the  base of the skull and goes to the shoulders.  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  These medications do not decrease her pain. \par \par

## 2022-05-22 NOTE — PHYSICAL EXAM
[FreeTextEntry1] : bilateral scm l>r\par methocatrbamol\par xray of  cervical spine\par pt\par rightshoulder lipoma\par \par Pleasant, in no distress. Language: English\par HEENT: Head: no trauma. Eyes: no discharge. Ears: No discharge. Nose No discharge. Throat: clear\par Neck: AROM.  Rotation 0 to 60 degrees bilaterally.  She has tightness in both sternocleidomastoid muscles.  Spasms in the bilateral paracervical musculature negative Spurlings\par Heart: RR, +S1, S2\par Lungs: CTA\par Abdomen: soft, NT\par Lumbar spine: FAROM, no spasm\par \par LUE: Shoulder:FAROM, MS 5-/5 tender on palpation of the upper trapezius.\par Elbow: FAROM, MS 5/5 reflexes 2/4\par Wrist: FAROM, MS 5/5 reflexes 2/4\par Warm, nontender, pulse 2+\par \par RUE:Shoulder:FAROM, MS 5-/5 tender on palpation of the upper trapezius.  Lipoma in the right shoulder 4 cm in diameter\par Elbow: FAROM, MS 5/5 reflexes 2/4\par Wrist: FAROM, MS 5/5 reflexes 2/4\par Warm, nontender, pulse 2+\par \par LLE: Hip: FAROM, MS 5/5\par Knee: FAROM, MS 5/5 reflexes 2/4\par Ankle: FAROM, MS 5/5 reflexes 2/4\par Warm , nontender, pulse 2+ negative homans\par \par RLE: Hip: FAROM, MS 5/5\par Knee: FAROM, MS 5/5 reflexes 2/4\par Ankle: FAROM, MS 5/5 reflexes 2/4\par Warm , nontender, pulse 2+ negative homans\par \par Gait: Spontaneous, reciprocal, safe without an assistive device\par \par Sensation\par RUE: sensation is intact to light touch, pinprick  and proprioception\par LUE: sensation is intact to light touch, pinprick  and proprioception\par RLE: sensation is intact to light touch, pinprick  and proprioception. Neg SLR. Neg PEACE, Neg FADIR\par LLE: sensation is intact to light touch, pinprick  and proprioception. Neg SLR. Neg PEACE, Neg FADIR\par \par

## 2022-05-24 ENCOUNTER — OUTPATIENT (OUTPATIENT)
Dept: OUTPATIENT SERVICES | Facility: HOSPITAL | Age: 52
LOS: 1 days | End: 2022-05-24
Payer: COMMERCIAL

## 2022-05-24 ENCOUNTER — APPOINTMENT (OUTPATIENT)
Dept: OTOLARYNGOLOGY | Facility: CLINIC | Age: 52
End: 2022-05-24
Payer: COMMERCIAL

## 2022-05-24 ENCOUNTER — APPOINTMENT (OUTPATIENT)
Dept: RADIOLOGY | Facility: IMAGING CENTER | Age: 52
End: 2022-05-24
Payer: COMMERCIAL

## 2022-05-24 VITALS — HEIGHT: 64 IN | BODY MASS INDEX: 32.78 KG/M2 | WEIGHT: 192 LBS

## 2022-05-24 DIAGNOSIS — R22.30 LOCALIZED SWELLING, MASS AND LUMP, UNSPECIFIED UPPER LIMB: Chronic | ICD-10-CM

## 2022-05-24 DIAGNOSIS — M54.12 RADICULOPATHY, CERVICAL REGION: ICD-10-CM

## 2022-05-24 DIAGNOSIS — R51.9 HEADACHE, UNSPECIFIED: ICD-10-CM

## 2022-05-24 PROCEDURE — 31231 NASAL ENDOSCOPY DX: CPT

## 2022-05-24 PROCEDURE — 99213 OFFICE O/P EST LOW 20 MIN: CPT | Mod: 25

## 2022-05-24 PROCEDURE — 72040 X-RAY EXAM NECK SPINE 2-3 VW: CPT

## 2022-05-24 PROCEDURE — 72040 X-RAY EXAM NECK SPINE 2-3 VW: CPT | Mod: 26

## 2022-05-24 NOTE — REASON FOR VISIT
[Sinusitis] : sinusitis [Subsequent Evaluation] : a subsequent evaluation for [FreeTextEntry2] : c/o sinus issues

## 2022-05-24 NOTE — DATA REVIEWED
[de-identified] : Hearing Test performed to evaluate the extent of hearing loss and  to explain pt's symptoms\par today's hearing test was personally reviewed and revealed\par barbyl [de-identified] : see PACS reports\par Mild PNS inflammation\par Brain and bllod vesslas-wnl\par Left scalp cyst

## 2022-05-24 NOTE — PHYSICAL EXAM
[Nasal Endoscopy Performed] : nasal endoscopy was performed, see procedure section for findings [] : septum deviated to the left [Midline] : trachea located in midline position [Normal] : no rashes [de-identified] : congested  [FreeTextEntry2] : scalp cyst-benign

## 2022-05-24 NOTE — ASSESSMENT
[FreeTextEntry1] : Ms. BLACKBURN 52 year F presents for follow up. Dizziness and headaches resolved after finishing Medrol dose pack. PND and nasal congestion controlled with Flonase \par \par Vestibulopathy secondary to DNS and sinus congestion\par -improved with Medrol dose pack \par \par DNS and Rhinitis\par -Rx: Steam humidification \par -Hypertonic saline nasal irrigations \par -RX: Flonase tx\par \par \par f/u prn and 9/2022

## 2022-05-24 NOTE — HISTORY OF PRESENT ILLNESS
[Ear Fullness] : ear fullness [Dizziness] : dizziness [Eustachian Tube Dysfunction] : eustachian tube dysfunction [Headache] : headache [Facial Pressure] : facial pressure [None] : No risk factors have been identified. [de-identified] : 51 yo female\par Patient complains of dizziness x 2 weeks. States she was walking and felt off balance lasted several minutes. Since then she feels like she is in a head fog. Dizziness comes in episodes, worse when she is walking fast or makes sharp movements. She has been getting intermittent headaches for about 3 years, has not been evaluated by neuro. Has nasal congestion and left clogged ear sensation as well. Pt has no ear pain, ear drainage, hearing loss, tinnitus, nasal discharge, epistaxis, sinus infections, facial pain, facial pressure, throat pain, dysphagia or fevers\par \par  [FreeTextEntry1] : Patient presents for follow up. States headaches and dizziness resolved after finishing Medrol dose pack. She still have PND with nasal congestion using Flonase with mild relief. No other modifying factors\par \par  [Hearing Loss] : no hearing loss [Allergic Rhinitis] : no allergic rhinitis [Early Onset Hearing Loss] : no early onset hearing loss [Stroke] : no stroke [Hyperthyroidism] : no hyperthyroidism [Sialadenitis] : no sialadenitis [Hodgkin Disease] : no hodgkin disease [Non-Hodgkin Lymphoma] : no non-hodgkin lymphoma [Graves Disease] : no graves disease [Thyroid Cancer] : no thyroid cancer

## 2022-06-28 ENCOUNTER — APPOINTMENT (OUTPATIENT)
Dept: PHYSICAL MEDICINE AND REHAB | Facility: CLINIC | Age: 52
End: 2022-06-28

## 2022-06-28 VITALS
HEIGHT: 64 IN | TEMPERATURE: 97.7 F | OXYGEN SATURATION: 98 % | RESPIRATION RATE: 17 BRPM | SYSTOLIC BLOOD PRESSURE: 122 MMHG | WEIGHT: 190.8 LBS | DIASTOLIC BLOOD PRESSURE: 80 MMHG | BODY MASS INDEX: 32.58 KG/M2 | HEART RATE: 86 BPM

## 2022-06-28 DIAGNOSIS — M54.12 RADICULOPATHY, CERVICAL REGION: ICD-10-CM

## 2022-06-28 PROCEDURE — 99213 OFFICE O/P EST LOW 20 MIN: CPT

## 2022-06-30 PROBLEM — M54.12 CERVICAL RADICULOPATHY: Status: ACTIVE | Noted: 2018-06-20

## 2022-06-30 NOTE — DATA REVIEWED
[MRI] : MRI [FreeTextEntry1] : 7/06/2019\par \par C1/2: There is mild arthrosis at the articulation of the dens with the \par anterior arch of C1. \par C2/C3: Normal \par C3/C4: Normal \par C4/C5: Mild to moderate circumferential disc bulging is present. Disc \par contacts the ventral aspect of the cord without cord signal abnormality. \par There is no foraminal narrowing. \par C5/C6: Normal \par C6/C7: There is a moderate to large central and right paracentral disc \par extrusion that contacts and mildly indents the right aspect of the cord. \par There is no cord signal abnormality. \par C7/T1: Normal \par \par cta 3.12.2022\par IMPRESSION:\par NONCONTRAST HEAD CT SCAN:\par 1. No CT evidence of acute intracranial pathology. Follow-up MRI is recommended to exclude a small acute infarct.\par 2. Incidentally noted is an empty sella and bilateral petrous apex cephaloceles, which are unchanged from MRI of 02/19/2019.\par \par CT ANGIOGRAPHY NECK: Patent cervical vasculature. No hemodynamically significant carotid stenosis or flow-limiting vertebral artery stenosis. No evidence of dissection.\par \par CT ANGIOGRAPHY BRAIN: No vessel occlusion, flow-limiting stenosis or aneurysm is identified about the [place] of [place].\par \par POSTCONTRAST HEAD CT SCAN: No intracranial mass lesion or abnormal enhancement. The dural venous sinuses are patent.\par \par \par ALIGNMENT: There is straightening of the normal cervical lordosis. \par CORD: No cord signal abnormality is identified. \par MARROW: There is no bone marrow edema or fracture. \par IMAGED BRAIN: Normal \par PERIPHERAL/NECK SOFT TISSUES: Normal \par \par IMPRESSION: Moderate to large central and right paracentral disc extrusion \par at C6-C7 and contacts and mildly indents the right aspect of the cord. \par \par Mild disc bulging at C4-C5 that contacts the ventral aspect of the cord \par without indentation. \par \par MRI of the right shoulder performed on 9/25/2016 reveals\par IMPRESSION: Prior rotator cuff surgery with a recurrent supraspinatus tear \par and moderate tendinosis of the infraspinatus with mild biceps tendinosis. \par Nonencapsulated lipoma within the lateral subcutaneous soft tissues of the \par shoulder without aggressive features.

## 2022-06-30 NOTE — HISTORY OF PRESENT ILLNESS
[FreeTextEntry1] : PCP Dr. Zana Christy \par [address] [facility]\par [place], [place] [address]\par \par She was unable to perform therapy because she lost the referral.\par \par 52 year old female neck pain and shoulder pain.  She denies an accident or  fall.\par \par Neck pain\par Pain:  7/10 Worse: 10/10 Quality: sharp  Frequency: constant\par The pain starts at the  base of the skull and goes to the shoulders.  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  These medications do not decrease her pain. \par \par She has right shoulder pain\par Pain:  8/10 Worse: 10/10 Quality: tight  Frequency: constant\par The pain is worse in the morning. The pain is aggravated with use of the arm above the shoulder level. \par She cannot lay on either shoulder at night because the pain in the shoulder wakes her from sleep.\par

## 2022-07-21 ENCOUNTER — APPOINTMENT (OUTPATIENT)
Dept: SURGERY | Facility: CLINIC | Age: 52
End: 2022-07-21

## 2022-07-21 VITALS
BODY MASS INDEX: 32.44 KG/M2 | SYSTOLIC BLOOD PRESSURE: 122 MMHG | WEIGHT: 190 LBS | DIASTOLIC BLOOD PRESSURE: 76 MMHG | HEART RATE: 88 BPM | OXYGEN SATURATION: 95 % | TEMPERATURE: 98.2 F | HEIGHT: 64 IN

## 2022-07-21 DIAGNOSIS — D17.21 BENIGN LIPOMATOUS NEOPLASM OF SKIN AND SUBCUTANEOUS TISSUE OF RIGHT ARM: ICD-10-CM

## 2022-07-21 DIAGNOSIS — R22.1 LOCALIZED SWELLING, MASS AND LUMP, NECK: ICD-10-CM

## 2022-07-21 PROCEDURE — 99203 OFFICE O/P NEW LOW 30 MIN: CPT

## 2022-07-21 NOTE — HISTORY OF PRESENT ILLNESS
[de-identified] : 52-year-old female with previous history of right shoulder mass excision x2 who presents to the office for an enlarging right shoulder mass.  She states that she had multiple excision however the mass has returned and is now causing symptoms in her posterior neck as well as her shoulder.  She also noted that she feels that there is a mass on her left neck.  She denies any constitutional symptoms associated with the shoulder mass.

## 2022-07-21 NOTE — PHYSICAL EXAM
[Alert] : alert [Oriented to Person] : oriented to person [Oriented to Place] : oriented to place [Oriented to Time] : oriented to time [Calm] : calm [de-identified] : Slight fullness on the left neck [de-identified] : Well-appearing [de-identified] : Large right-sided shoulder mass, mass consistent with lipoma however liposarcoma cannot be excluded due to the size as well as the nature of the recurrence

## 2022-07-21 NOTE — ASSESSMENT
[FreeTextEntry1] : 52-year-old female with recurrent right-sided shoulder mass\par 1) right shoulder mass: Although the mass feels lipomatous in nature, liposarcoma cannot be excluded as the mass has recurred twice already since the last removal.  She will need MRI of the right shoulder to further evaluate the mass took sure that it is not liposarcoma but is is lipoma.  \par 2) left neck mass: Ultrasound of the left neck to evaluate the area further. \par \par All questions were answered in detail and the patient expressed full understanding

## 2022-07-26 ENCOUNTER — APPOINTMENT (OUTPATIENT)
Dept: PHYSICAL MEDICINE AND REHAB | Facility: CLINIC | Age: 52
End: 2022-07-26

## 2022-07-28 ENCOUNTER — EMERGENCY (EMERGENCY)
Facility: HOSPITAL | Age: 52
LOS: 1 days | Discharge: HOME | End: 2022-07-28
Admitting: EMERGENCY MEDICINE

## 2022-07-28 VITALS
DIASTOLIC BLOOD PRESSURE: 90 MMHG | RESPIRATION RATE: 18 BRPM | HEIGHT: 64 IN | SYSTOLIC BLOOD PRESSURE: 135 MMHG | HEART RATE: 94 BPM | OXYGEN SATURATION: 100 % | TEMPERATURE: 97 F

## 2022-07-28 DIAGNOSIS — R22.30 LOCALIZED SWELLING, MASS AND LUMP, UNSPECIFIED UPPER LIMB: Chronic | ICD-10-CM

## 2022-07-28 PROCEDURE — 99285 EMERGENCY DEPT VISIT HI MDM: CPT

## 2022-07-28 NOTE — ED ADULT NURSE NOTE - CHIEF COMPLAINT QUOTE
Pt with atraumatic R medial ankle swelling x1 week, worsening today. Patient states her period is also "heavy" and would like to be evaluated for this. Denies long car rides/flights, SOB, CP, dizziness, lightheadedness, abd pain. Ambulatory in triage. PMHx DM, HLD. .

## 2022-07-28 NOTE — ED ADULT NURSE NOTE - OBJECTIVE STATEMENT
Pt received c/o R ankle swelling and redness, and heavy menstrual flow, pt states she does not currently have her period but wanted to get it checked out. Denies CP/SOB, denies fever/chills, denies injury. NAD. Unable to obtain IV access, SIMEON Berman at bedside obtaining access. UA/UC collected, sent to lab. UCG running. Awaiting further orders.

## 2022-07-28 NOTE — ED ADULT TRIAGE NOTE - CHIEF COMPLAINT QUOTE
Pt with atraumatic R sided ankle swelling x1 week, worsening today. Patient states her period is also "heavy" and would like to be evaluated for this. Denies long car rides/flights, SOB, CP, dizziness, lightheadedness, abd pain. Ambulatory in triage. PMHx DM, HLD. . Pt with atraumatic R medial ankle swelling x1 week, worsening today. Patient states her period is also "heavy" and would like to be evaluated for this. Denies long car rides/flights, SOB, CP, dizziness, lightheadedness, abd pain. Ambulatory in triage. PMHx DM, HLD. .

## 2022-07-29 VITALS
RESPIRATION RATE: 16 BRPM | OXYGEN SATURATION: 100 % | TEMPERATURE: 98 F | DIASTOLIC BLOOD PRESSURE: 66 MMHG | HEART RATE: 82 BPM | SYSTOLIC BLOOD PRESSURE: 122 MMHG

## 2022-07-29 LAB
ALBUMIN SERPL ELPH-MCNC: 4.7 G/DL — SIGNIFICANT CHANGE UP (ref 3.3–5)
ALP SERPL-CCNC: 58 U/L — SIGNIFICANT CHANGE UP (ref 40–120)
ALT FLD-CCNC: 20 U/L — SIGNIFICANT CHANGE UP (ref 4–33)
ANION GAP SERPL CALC-SCNC: 8 MMOL/L — SIGNIFICANT CHANGE UP (ref 7–14)
APPEARANCE UR: CLEAR — SIGNIFICANT CHANGE UP
AST SERPL-CCNC: 23 U/L — SIGNIFICANT CHANGE UP (ref 4–32)
BACTERIA # UR AUTO: NEGATIVE — SIGNIFICANT CHANGE UP
BASOPHILS # BLD AUTO: 0.05 K/UL — SIGNIFICANT CHANGE UP (ref 0–0.2)
BASOPHILS NFR BLD AUTO: 0.7 % — SIGNIFICANT CHANGE UP (ref 0–2)
BILIRUB SERPL-MCNC: 0.2 MG/DL — SIGNIFICANT CHANGE UP (ref 0.2–1.2)
BILIRUB UR-MCNC: NEGATIVE — SIGNIFICANT CHANGE UP
BUN SERPL-MCNC: 7 MG/DL — SIGNIFICANT CHANGE UP (ref 7–23)
CALCIUM SERPL-MCNC: 9.4 MG/DL — SIGNIFICANT CHANGE UP (ref 8.4–10.5)
CHLORIDE SERPL-SCNC: 99 MMOL/L — SIGNIFICANT CHANGE UP (ref 98–107)
CO2 SERPL-SCNC: 25 MMOL/L — SIGNIFICANT CHANGE UP (ref 22–31)
COLOR SPEC: COLORLESS — SIGNIFICANT CHANGE UP
CREAT SERPL-MCNC: 0.74 MG/DL — SIGNIFICANT CHANGE UP (ref 0.5–1.3)
DIFF PNL FLD: NEGATIVE — SIGNIFICANT CHANGE UP
EGFR: 97 ML/MIN/1.73M2 — SIGNIFICANT CHANGE UP
EOSINOPHIL # BLD AUTO: 0.64 K/UL — HIGH (ref 0–0.5)
EOSINOPHIL NFR BLD AUTO: 9.5 % — HIGH (ref 0–6)
EPI CELLS # UR: 2 /HPF — SIGNIFICANT CHANGE UP (ref 0–5)
GLUCOSE SERPL-MCNC: 133 MG/DL — HIGH (ref 70–99)
GLUCOSE UR QL: ABNORMAL
HCT VFR BLD CALC: 41.8 % — SIGNIFICANT CHANGE UP (ref 34.5–45)
HGB BLD-MCNC: 13.6 G/DL — SIGNIFICANT CHANGE UP (ref 11.5–15.5)
HYALINE CASTS # UR AUTO: 1 /LPF — SIGNIFICANT CHANGE UP (ref 0–7)
IANC: 2.1 K/UL — SIGNIFICANT CHANGE UP (ref 1.8–7.4)
IMM GRANULOCYTES NFR BLD AUTO: 0.3 % — SIGNIFICANT CHANGE UP (ref 0–1.5)
KETONES UR-MCNC: NEGATIVE — SIGNIFICANT CHANGE UP
LEUKOCYTE ESTERASE UR-ACNC: ABNORMAL
LYMPHOCYTES # BLD AUTO: 3.51 K/UL — HIGH (ref 1–3.3)
LYMPHOCYTES # BLD AUTO: 51.9 % — HIGH (ref 13–44)
MCHC RBC-ENTMCNC: 30.8 PG — SIGNIFICANT CHANGE UP (ref 27–34)
MCHC RBC-ENTMCNC: 32.5 GM/DL — SIGNIFICANT CHANGE UP (ref 32–36)
MCV RBC AUTO: 94.8 FL — SIGNIFICANT CHANGE UP (ref 80–100)
MONOCYTES # BLD AUTO: 0.44 K/UL — SIGNIFICANT CHANGE UP (ref 0–0.9)
MONOCYTES NFR BLD AUTO: 6.5 % — SIGNIFICANT CHANGE UP (ref 2–14)
NEUTROPHILS # BLD AUTO: 2.1 K/UL — SIGNIFICANT CHANGE UP (ref 1.8–7.4)
NEUTROPHILS NFR BLD AUTO: 31.1 % — LOW (ref 43–77)
NITRITE UR-MCNC: NEGATIVE — SIGNIFICANT CHANGE UP
NRBC # BLD: 0 /100 WBCS — SIGNIFICANT CHANGE UP
NRBC # FLD: 0 K/UL — SIGNIFICANT CHANGE UP
PH UR: 8 — SIGNIFICANT CHANGE UP (ref 5–8)
PLATELET # BLD AUTO: 271 K/UL — SIGNIFICANT CHANGE UP (ref 150–400)
POTASSIUM SERPL-MCNC: 3.9 MMOL/L — SIGNIFICANT CHANGE UP (ref 3.5–5.3)
POTASSIUM SERPL-SCNC: 3.9 MMOL/L — SIGNIFICANT CHANGE UP (ref 3.5–5.3)
PROT SERPL-MCNC: 7.5 G/DL — SIGNIFICANT CHANGE UP (ref 6–8.3)
PROT UR-MCNC: NEGATIVE — SIGNIFICANT CHANGE UP
RBC # BLD: 4.41 M/UL — SIGNIFICANT CHANGE UP (ref 3.8–5.2)
RBC # FLD: 11.6 % — SIGNIFICANT CHANGE UP (ref 10.3–14.5)
RBC CASTS # UR COMP ASSIST: 0 /HPF — SIGNIFICANT CHANGE UP (ref 0–4)
SODIUM SERPL-SCNC: 132 MMOL/L — LOW (ref 135–145)
SP GR SPEC: 1.01 — SIGNIFICANT CHANGE UP (ref 1–1.05)
UROBILINOGEN FLD QL: SIGNIFICANT CHANGE UP
WBC # BLD: 6.76 K/UL — SIGNIFICANT CHANGE UP (ref 3.8–10.5)
WBC # FLD AUTO: 6.76 K/UL — SIGNIFICANT CHANGE UP (ref 3.8–10.5)
WBC UR QL: 3 /HPF — SIGNIFICANT CHANGE UP (ref 0–5)

## 2022-07-29 PROCEDURE — 73600 X-RAY EXAM OF ANKLE: CPT | Mod: 26,RT

## 2022-07-29 PROCEDURE — 73620 X-RAY EXAM OF FOOT: CPT | Mod: 26,RT

## 2022-07-29 PROCEDURE — 76830 TRANSVAGINAL US NON-OB: CPT | Mod: 26

## 2022-07-29 RX ORDER — MORPHINE SULFATE 50 MG/1
2 CAPSULE, EXTENDED RELEASE ORAL ONCE
Refills: 0 | Status: DISCONTINUED | OUTPATIENT
Start: 2022-07-29 | End: 2022-07-29

## 2022-07-29 RX ORDER — OXYCODONE AND ACETAMINOPHEN 5; 325 MG/1; MG/1
1 TABLET ORAL ONCE
Refills: 0 | Status: DISCONTINUED | OUTPATIENT
Start: 2022-07-29 | End: 2022-07-29

## 2022-07-29 RX ADMIN — OXYCODONE AND ACETAMINOPHEN 1 TABLET(S): 5; 325 TABLET ORAL at 03:43

## 2022-07-29 NOTE — ED PROVIDER NOTE - CLINICAL SUMMARY MEDICAL DECISION MAKING FREE TEXT BOX
53 y/o female with hx of DM, CVA, CAD, and HLD presents c/o swelling to her R ankle that she awoke with associated with pain to the medial aspect of her ankle to her heel when she puts pressure on it. Will obtain labs to assess for leukocytosis/anemia, xray of foot and ankle for joint effusion/arthritic changes/fx, and TVUS to assess for mass/fibroids. Dispo pending.

## 2022-07-29 NOTE — ED PROVIDER NOTE - OBJECTIVE STATEMENT
53 y/o female with hx of DM, CVA, CAD, and HLD presents c/o swelling to her R ankle that she awoke with associated with pain to the medial aspect of her ankle to her heel when she puts pressure on it. Denies trauma or falls. She also c/o heavy periods that has occurred the last 2 cycles. Her LMP 51 y/o female with hx of DM, CVA, CAD, and HLD presents c/o swelling to her R ankle that she awoke with associated with pain to the medial aspect of her ankle to her heel when she puts pressure on it. Denies trauma or falls. Pt denies hx of gout or previous ankle or joint swelling. She also c/o heavy periods that have occurred the last 2 cycles. Her LMP started last week and finished yesterday. Denies bleeding presently. No dysuria, frequency, dizziness, or lightheadedness. No fever, chills, CP, SOB. No other complaints.

## 2022-07-29 NOTE — ED PROVIDER NOTE - PATIENT PORTAL LINK FT
You can access the FollowMyHealth Patient Portal offered by [facility] by registering at the following website: [email or web address]. By joining [facility]’s FollowMyHealth portal, you will also be able to view your health information using other applications (apps) compatible with our system.

## 2022-07-29 NOTE — ED PROVIDER NOTE - NSFOLLOWUPINSTRUCTIONS_ED_ALL_ED_FT
Arthritis      Arthritis means joint pain. It can also mean joint disease. A joint is a place where bones come together. There are more than 100 types of arthritis.      What are the causes?    This condition may be caused by:  •Wear and tear of a joint. This is the most common cause.      •A lot of acid in the blood, which leads to pain in the joint (gout).      •Pain and swelling (inflammation) in a joint.      •Infection of a joint.      •Injuries in the joint.      •A reaction to medicines (allergy).      In some cases, the cause may not be known.      What are the signs or symptoms?    Symptoms of this condition include:  •Redness at a joint.      •Swelling at a joint.      •Stiffness at a joint.      •Warmth coming from the joint.      •A fever.      •A feeling of being sick.        How is this treated?    This condition may be treated with:  •Treating the cause, if it is known.      •Rest.      •Raising (elevating) the joint.      •Putting cold or hot packs on the joint.      •Medicines to treat symptoms and reduce pain and swelling.      •Shots of medicines (cortisone) into the joint.      You may also be told to make changes in your life, such as doing exercises and losing weight.      Follow these instructions at home:    Medicines     •Take over-the-counter and prescription medicines only as told by your doctor.      • Do not take aspirin for pain if your doctor says that you may have gout.      Activity     •Rest your joint if your doctor tells you to.      •Avoid activities that make the pain worse.    •Exercise your joint regularly as told by your doctor. Try doing exercises like:  •Swimming.      •Water aerobics.      •Biking.      •Walking.          Managing pain, stiffness, and swelling                 •If told, put ice on the affected area.  •Put ice in a plastic bag.      •Place a towel between your skin and the bag.      •Leave the ice on for 20 minutes, 2–3 times per day.        •If your joint is swollen, raise (elevate) it above the level of your heart if told by your doctor.      •If your joint feels stiff in the morning, try taking a warm shower.    •If told, put heat on the affected area. Do this as often as told by your doctor. Use the heat source that your doctor recommends, such as a moist heat pack or a heating pad. If you have diabetes, do not apply heat without asking your doctor. To apply heat:  •Place a towel between your skin and the heat source.      •Leave the heat on for 20–30 minutes.      •Remove the heat if your skin turns bright red. This is very important if you are unable to feel pain, heat, or cold. You may have a greater risk of getting burned.        General instructions     • Do not use any products that contain nicotine or tobacco, such as cigarettes, e-cigarettes, and chewing tobacco. If you need help quitting, ask your doctor.      •Keep all follow-up visits as told by your doctor. This is important.        Contact a doctor if:    •The pain gets worse.      •You have a fever.        Get help right away if:    •You have very bad pain in your joint.      •You have swelling in your joint.      •Your joint is red.      •Many joints become painful and swollen.      •You have very bad back pain.      •Your leg is very weak.      •You cannot control your pee (urine) or poop (stool).        Summary    •Arthritis means joint pain. It can also mean joint disease. A joint is a place where bones come together.      •The most common cause of this condition is wear and tear of a joint.       •Symptoms of this condition include redness, swelling, or stiffness of the joint.       •This condition is treated with rest, raising the joint, medicines, and putting cold or hot packs on the joint.      •Follow your doctor's instructions about medicines, activity, exercises, and other home care treatments.      This information is not intended to replace advice given to you by your health care provider. Make sure you discuss any questions you have with your health care provider.        Uterine Fibroids       Uterine fibroids are lumps of tissue (tumors) in the womb (uterus). Fibroids are not cancerous. Most women with this condition do not need treatment.    Sometimes, fibroids can make it harder to have children. If this happens, you may need surgery to take out the fibroids.      What are the causes?    The cause of this condition is not known.      What increases the risk?    •You are in your 30s or 40s and have not gone through menopause. Menopause is when you have not had a menstrual period for 12 months.      •Having a history of fibroids in your family.      •You are of  descent.      •You started your period at age 10 or younger.      •You have not given birth.      •You are overweight or very overweight.        What are the signs or symptoms?    •Bleeding between menstrual periods.      •Heavy bleeding during your menstrual period.      •Pain in the area between your hips.      •Needing to pee (urinate) right away or more often than usual.      •Not being able to have children (infertility).      •Not being able to stay pregnant (miscarriage).      Many women do not have symptoms.       How is this treated?    Treatment may include:  •Follow-up visits with your doctor to check your fibroids for any changes.      •Medicines to help with pain, such as aspirin or ibuprofen.      •Hormone therapy. This may be given as a pill, in a shot, or with a type of birth control device called an IUD.    •Surgery that would do one of these things:  •Take out the fibroids. This may be done if you want to become pregnant.      •Take out the womb (hysterectomy).      •Stop the blood flow to the fibroids.          Follow these instructions at home:    Medicines     •Take over-the-counter and prescription medicines only as told by your doctor.    •Ask your doctor if you should:  •Take iron pills.      •Eat more foods that have a lot of iron in them, such as dark green, leafy vegetables.          Managing pain      If told, put heat on your back or belly. Do this as often as told by your doctor. Use the heat source that your doctor recommends, such as a moist heat pack or a heating pad. To do this:  •Put a towel between your skin and the heat pack or pad.      •Leave the heat on for 20–30 minutes.      •Take off the heat if your skin turns bright red. This is very important. If you cannot feel pain, heat, or cold, you may have a greater risk of getting burned.      General instructions   •Tell your doctor about any changes to your menstrual period, such as:  •Heavy bleeding that needs a change of tampons or pads more than normal.      •A change in how many days your period lasts.      •A change in symptoms that come with your period. This might be belly cramps or back pain.        •Keep all follow-up visits.         Contact a doctor if:  •You have pain that does not get better with medicine or heat. This may include pain or cramps in:  •The area between your hip bones.      •Your back.      •Your belly.        •You have new bleeding between your periods.      •You have more bleeding during or between your periods.      •You feel very tired or weak.      •You feel dizzy.        Get help right away if:    •You faint.      •You have pain in the area between your hip bones that gets worse.      •You have bleeding that soaks a tampon or pad in 30 minutes or less.        Summary    •Uterine fibroids are lumps of tissue (tumors) in your womb. They are not cancerous.      •Medicines such as aspirin or ibuprofen may be used to help with pain.      •Contact a doctor if you have pain or cramps that do not get better with medicine.      •Know the symptoms for when you should get help right away.      This information is not intended to replace advice given to you by your health care provider. Make sure you discuss any questions you have with your health care provider.

## 2022-07-29 NOTE — ED PROVIDER NOTE - PHYSICAL EXAMINATION
CONSTITUTIONAL: Well-appearing; well-nourished; in no apparent distress. Non-toxic appearing.   NEURO: Alert & oriented. Sensory and motor functions are grossly intact.  PSYCH: Mood appropriate. Thought processes intact.   NECK: Supple  CARD: Regular rate and rhythm, no murmurs  RESP: No accessory muscle use; breath sounds clear and equal bilaterally; no wheezes, rhonchi, or rales     ABD: Soft; non-distended; non-tender. No guarding or rebound.   MUSCULOSKELETAL/EXTREMITIES: There is mild swelling without TTP of R medial ankle. There is tenderness to the R heel to palpation without edema or ecchymosis. ROM intact to R ankle. DP pulses 2+ b/l. Calves of equal girth, non-edematous, non-tender.    SKIN: Warm; dry; no apparent lesions or exudate

## 2022-07-29 NOTE — ED PROVIDER NOTE - NS ED ROS FT
ROS:  GENERAL: No fever, no chills  CARDIAC: no chest pain  PULMONARY: no cough, no shortness of breath  GI: no abdominal pain, no nausea, no vomiting, no diarrhea, no constipation  : As per HPI   SKIN: no rashes  NEURO: no headache, no weakness, no dizziness  MSK: As per HPI   All other systems reviewed as negative.

## 2022-07-30 LAB
CULTURE RESULTS: SIGNIFICANT CHANGE UP
SPECIMEN SOURCE: SIGNIFICANT CHANGE UP

## 2022-08-06 ENCOUNTER — APPOINTMENT (OUTPATIENT)
Dept: MRI IMAGING | Facility: CLINIC | Age: 52
End: 2022-08-06

## 2022-08-08 ENCOUNTER — RESULT REVIEW (OUTPATIENT)
Age: 52
End: 2022-08-08

## 2022-08-08 ENCOUNTER — APPOINTMENT (OUTPATIENT)
Dept: OBGYN | Facility: HOSPITAL | Age: 52
End: 2022-08-08

## 2022-08-08 ENCOUNTER — OUTPATIENT (OUTPATIENT)
Dept: OUTPATIENT SERVICES | Facility: HOSPITAL | Age: 52
LOS: 1 days | End: 2022-08-08

## 2022-08-08 VITALS
BODY MASS INDEX: 33.29 KG/M2 | DIASTOLIC BLOOD PRESSURE: 87 MMHG | SYSTOLIC BLOOD PRESSURE: 139 MMHG | TEMPERATURE: 98 F | HEART RATE: 91 BPM | HEIGHT: 64 IN | WEIGHT: 195 LBS

## 2022-08-08 DIAGNOSIS — R22.30 LOCALIZED SWELLING, MASS AND LUMP, UNSPECIFIED UPPER LIMB: Chronic | ICD-10-CM

## 2022-08-08 PROCEDURE — 99213 OFFICE O/P EST LOW 20 MIN: CPT | Mod: 25,GE

## 2022-08-09 ENCOUNTER — APPOINTMENT (OUTPATIENT)
Dept: ORTHOPEDIC SURGERY | Facility: CLINIC | Age: 52
End: 2022-08-09

## 2022-08-09 VITALS
SYSTOLIC BLOOD PRESSURE: 135 MMHG | BODY MASS INDEX: 32.44 KG/M2 | HEIGHT: 64 IN | DIASTOLIC BLOOD PRESSURE: 85 MMHG | WEIGHT: 190 LBS | HEART RATE: 90 BPM

## 2022-08-09 DIAGNOSIS — M79.671 PAIN IN RIGHT FOOT: ICD-10-CM

## 2022-08-09 DIAGNOSIS — D21.9 BENIGN NEOPLASM OF CONNECTIVE AND OTHER SOFT TISSUE, UNSPECIFIED: ICD-10-CM

## 2022-08-09 DIAGNOSIS — M72.2 PLANTAR FASCIAL FIBROMATOSIS: ICD-10-CM

## 2022-08-09 DIAGNOSIS — M79.672 PAIN IN RIGHT FOOT: ICD-10-CM

## 2022-08-09 LAB — HPV HIGH+LOW RISK DNA PNL CVX: SIGNIFICANT CHANGE UP

## 2022-08-09 PROCEDURE — 73630 X-RAY EXAM OF FOOT: CPT | Mod: LT

## 2022-08-09 PROCEDURE — 99203 OFFICE O/P NEW LOW 30 MIN: CPT

## 2022-08-09 RX ORDER — METHYLPREDNISOLONE 4 MG/1
4 TABLET ORAL
Qty: 1 | Refills: 1 | Status: DISCONTINUED | COMMUNITY
Start: 2022-04-26 | End: 2022-08-09

## 2022-08-09 RX ORDER — OXYCODONE AND ACETAMINOPHEN 5; 325 MG/1; MG/1
5-325 TABLET ORAL 4 TIMES DAILY
Qty: 28 | Refills: 0 | Status: DISCONTINUED | COMMUNITY
Start: 2022-05-18 | End: 2022-08-09

## 2022-08-09 RX ORDER — METHOCARBAMOL 500 MG/1
500 TABLET, FILM COATED ORAL AT BEDTIME
Qty: 30 | Refills: 0 | Status: DISCONTINUED | COMMUNITY
Start: 2022-05-18 | End: 2022-08-09

## 2022-08-09 RX ORDER — DICLOFENAC SODIUM 50 MG/1
50 TABLET, DELAYED RELEASE ORAL
Qty: 60 | Refills: 0 | Status: DISCONTINUED | COMMUNITY
Start: 2022-06-28 | End: 2022-08-09

## 2022-08-09 RX ORDER — DICLOFENAC SODIUM 1% 10 MG/G
1 GEL TOPICAL
Qty: 1 | Refills: 3 | Status: DISCONTINUED | COMMUNITY
Start: 2022-05-18 | End: 2022-08-09

## 2022-08-09 NOTE — PHYSICAL EXAM
[Slightly Antalgic] : slightly antalgic [LE] : Sensory: Intact in bilateral lower extremities [DP] : dorsalis pedis 2+ and symmetric bilaterally [PT] : posterior tibial 2+ and symmetric bilaterally [Normal] : Alert and in no acute distress [Poor Appearance] : well-appearing [Acute Distress] : not in acute distress [Obese] : not obese [de-identified] : The patient has no respiratory distress. Mood and affect are normal. The patient is alert and oriented to person, place and time.\par There is no pain with motion of the hips or motion of the knees.  Both calves are nontender.  She has tight calves bilaterally.  Both Achilles tendons are intact.  There is tenderness at both heels.  There is no ankle instability.  There is no dorsal tenderness of the feet.  The skin is intact.  There is no lymphedema. [de-identified] : \par ACC: [id number] EXAM: XR FOOT 2 VIEWS RT\par ACC: [id number] EXAM: XR ANKLE 2 VIEWS RT\par \par PROCEDURE DATE: 07/29/2022\par INTERPRETATION: CLINICAL INFORMATION: Atraumatic pain and swelling to the medial ankle and heel.\par \par TECHNIQUE: 3 views of the right ankle and 3 views of the right foot.\par \par COMPARISON: None available\par \par FINDINGS:\par No acute fractures or dislocations.\par \par Joint spaces are preserved.\par \par Soft tissue edema adjacent to the medial malleolus.\par \par IMPRESSION:\par No acute fractures or dislocations.\par \par Soft tissue edema adjacent to the medial malleolus.\par \par --- End of Report ---\par \par  DAVE MIKE MD; Resident Radiology\par This document has been electronically signed.\par JOHN STAUFFER MD; Attending Radiologist\par This document has been electronically signed. Jul 29 2022 8:45AM\par \par \par AP, lateral and oblique x-rays of both feet taken today demonstrate no fracture, no dislocation and no bony abnormality.

## 2022-08-09 NOTE — DISCUSSION/SUMMARY
[de-identified] : The patient has bilateral plantar fasciitis.  I have discussed the pathology, natural history and treatment options with her.  She is started on a course of diclofenac.  Medication risks have been reviewed.  She is referred for physical therapy to focus on calf stretching.  She is advised to wear shoe with a good cushion and a good arch support.  She should avoid high-heeled shoes.  She can be reevaluated in 6 weeks.

## 2022-08-09 NOTE — HISTORY OF PRESENT ILLNESS
[de-identified] : 52 year old female presents for initial evaluation of bilateral foot pain. She denies any trauma or injury. Complains of constant pain in the right foot, generalized all over the foot. Left foot pain is intermittent in nature, worse on the plantar aspect of the 1st MTP. Pain is worse in both feet with walking, standing and wearing heels.She states the pain in her right foot became so severe several weeks ago that she went to [facility] ER where she had x-rays done which were negative for fracture. She has taken Motrin with no relief. She denies numbness or tingling.

## 2022-08-10 LAB — CYTOLOGY SPEC DOC CYTO: SIGNIFICANT CHANGE UP

## 2022-08-10 NOTE — PHYSICAL EXAM
[Appropriately responsive] : appropriately responsive [No Lymphadenopathy] : no lymphadenopathy [Regular Rate Rhythm] : regular rate rhythm [Clear to Auscultation B/L] : clear to auscultation bilaterally [Soft] : soft [Non-tender] : non-tender [No Mass] : no mass [Oriented x3] : oriented x3 [Labia Majora] : normal [Labia Minora] : normal [Pink Rugae] : pink rugae [No Bleeding] : There was no active vaginal bleeding [Normal] : normal [Enlarged ___ wks] : enlarged [unfilled] ~Uweeks [Uterine Adnexae] : non-palpable [FreeTextEntry6] : Bulky, mobile uterus measuring four cm above the pubic symphysis. Adnexa nonpalpable bilaterally. Nontender. No CMT.

## 2022-08-10 NOTE — DISCUSSION/SUMMARY
[FreeTextEntry1] : 53y/o P2 LMP 7/8 presenting for follow-up after a recent ED visit where she was found to have a fibroid uterus. Patient reports that she desires hysterectomy for symptomatic fibroids (pelvic discomfort, pressure). \par \par  - Pap smear performed\par  - Referred to follow-up in booking clinic for presurgical consultation given preference for hysterectomy for definitive management\par \par To be d/w Dr. De Leon\danilo Dangelo PGY4

## 2022-08-10 NOTE — HISTORY OF PRESENT ILLNESS
[FreeTextEntry1] : 53y/o P2 LMP  presenting for follow-up after a recent ED visit for multiple complaints including abdominal discomfort. At that time, patient had a TVUS which demonstrated an enlarged multi-fibroid uterus. She denies previous knowledge of having fibroids, but reports that her sister had fibroids. She reports feeling bloated and distended. She denies recent change to her bladder or bowel habits. Denies recent weight change.\par \par She reports regular monthly menstrual cycles lasting approximately 3-5d. She reports heavy periods and notes occasional passage of small clots. She denies lightheadedness, dizziness or shortness of breath. \par \par She has not had recent GYN care but denies any h/o abnormal Pap smears. She denies a history of STI. \par \par TVUS : \par  - Uterus 11x9x9.3cm, Right submucosal fibroid 4.7x3.9x5.7cm, left submucosal fibroid 4.4x4.6x4.5cm. Bilateral ovaries not visualized. \par \par ObHx:  x2 \par GynHx: Recently diagnosed fibroids, denies h/o ovarian cysts. Denies h/o irregular pap smears \par PMHx: T2DM, CAD, HLD, shoulder/neck pain \par PSHx: Left shoulder surgery (Lipoma removal). Denies h/o abdominal surgery. \par Allergies: NKDA \par \par PCP: Dr. Westley Vines \par

## 2022-08-24 ENCOUNTER — APPOINTMENT (OUTPATIENT)
Dept: OBGYN | Facility: HOSPITAL | Age: 52
End: 2022-08-24

## 2022-09-06 ENCOUNTER — APPOINTMENT (OUTPATIENT)
Dept: OTOLARYNGOLOGY | Facility: CLINIC | Age: 52
End: 2022-09-06

## 2022-09-13 ENCOUNTER — APPOINTMENT (OUTPATIENT)
Dept: OTOLARYNGOLOGY | Facility: CLINIC | Age: 52
End: 2022-09-13

## 2022-09-13 VITALS
HEIGHT: 64 IN | WEIGHT: 190 LBS | HEART RATE: 83 BPM | DIASTOLIC BLOOD PRESSURE: 87 MMHG | SYSTOLIC BLOOD PRESSURE: 128 MMHG | BODY MASS INDEX: 32.44 KG/M2

## 2022-09-13 DIAGNOSIS — R42 DIZZINESS AND GIDDINESS: ICD-10-CM

## 2022-09-13 DIAGNOSIS — E11.9 TYPE 2 DIABETES MELLITUS W/OUT COMPLICATIONS: ICD-10-CM

## 2022-09-13 DIAGNOSIS — J31.0 CHRONIC RHINITIS: ICD-10-CM

## 2022-09-13 DIAGNOSIS — J34.2 DEVIATED NASAL SEPTUM: ICD-10-CM

## 2022-09-13 PROCEDURE — 99213 OFFICE O/P EST LOW 20 MIN: CPT

## 2022-09-13 NOTE — PHYSICAL EXAM
[Nasal Endoscopy Performed] : nasal endoscopy was performed, see procedure section for findings [] : septum deviated to the left [Midline] : trachea located in midline position [Normal] : no rashes [de-identified] : congested  [FreeTextEntry2] : scalp cyst-benign

## 2022-09-13 NOTE — REASON FOR VISIT
[Subsequent Evaluation] : a subsequent evaluation for [Sinusitis] : sinusitis [FreeTextEntry2] : c/o sinus issues

## 2022-09-13 NOTE — HISTORY OF PRESENT ILLNESS
[Ear Fullness] : ear fullness [Dizziness] : dizziness [Eustachian Tube Dysfunction] : eustachian tube dysfunction [Headache] : headache [Facial Pressure] : facial pressure [None] : No risk factors have been identified. [de-identified] : 53 yo female\par Patient presents for follow up. States dizziness and headaches resolved. Last episodes was 3 months ago. She continues to use Flonase which helps with nasal congestion. Gets intermittent left sided ear pain when she sticks a qtip into her ear.  [FreeTextEntry1] : \par  [Hearing Loss] : no hearing loss [Allergic Rhinitis] : no allergic rhinitis [Early Onset Hearing Loss] : no early onset hearing loss [Stroke] : no stroke [Hyperthyroidism] : no hyperthyroidism [Sialadenitis] : no sialadenitis [Hodgkin Disease] : no hodgkin disease [Non-Hodgkin Lymphoma] : no non-hodgkin lymphoma [Graves Disease] : no graves disease [Thyroid Cancer] : no thyroid cancer

## 2022-09-13 NOTE — ASSESSMENT
[FreeTextEntry1] : Ms. BLACKBURN 52 year F presents for follow up. Dizziness and headaches resolved since last visit. She uses Flonase daily which helps with nasal congestion. \par \par Vestibulopathy:\par -resolved \par -Take Meclizine/Bonine prn \par \par DNS and Rhinitis\par -continue Flonase treatment and steam humidification\par \par Otalgia most likely due to TMJ \par -dental eval advised \par \par f/u prn

## 2022-09-13 NOTE — END OF VISIT
[Time Spent: ___ minutes] : I have spent [unfilled] minutes of time on the encounter. [FreeTextEntry3] : I personally saw and examined MEDHAT BLACKBURN in detail. I spoke to HANNAH Castro regarding the assessment and plan of care. I reviewed the above assessment and plan of care, and agree. I have made changes in changes in the body of the note where appropriate.I personally reviewed the HPI, PMH, FH, SH, ROS and medications/allergies. I have spoken to HANNAH Castro regarding the history and have personally determined the assessment and plan of care, and documented this myself. I was present and participated in all key portions of the encounter and all procedures noted above. I have made changes in the body of the note where appropriate.\par \par Attesting Faculty: See Attending Signature Below \par \par \par

## 2022-09-13 NOTE — DATA REVIEWED
[de-identified] : Hearing Test performed to evaluate the extent of hearing loss and  to explain pt's symptoms\par today's hearing test was personally reviewed and revealed\par barbyl [de-identified] : see PACS reports\par Mild PNS inflammation\par Brain and bllod vesslas-wnl\par Left scalp cyst

## 2022-10-07 ENCOUNTER — EMERGENCY (EMERGENCY)
Facility: HOSPITAL | Age: 52
LOS: 1 days | Discharge: HOME | End: 2022-10-07
Attending: EMERGENCY MEDICINE | Admitting: EMERGENCY MEDICINE

## 2022-10-07 VITALS
DIASTOLIC BLOOD PRESSURE: 82 MMHG | SYSTOLIC BLOOD PRESSURE: 139 MMHG | HEIGHT: 64 IN | RESPIRATION RATE: 16 BRPM | HEART RATE: 78 BPM | TEMPERATURE: 98 F | OXYGEN SATURATION: 100 %

## 2022-10-07 DIAGNOSIS — R22.30 LOCALIZED SWELLING, MASS AND LUMP, UNSPECIFIED UPPER LIMB: Chronic | ICD-10-CM

## 2022-10-07 PROCEDURE — 99284 EMERGENCY DEPT VISIT MOD MDM: CPT

## 2022-10-07 PROCEDURE — 93010 ELECTROCARDIOGRAM REPORT: CPT

## 2022-10-07 NOTE — ED ADULT TRIAGE NOTE - CHIEF COMPLAINT QUOTE
Pt arrives to ED c/o back swelling as well as left and right side of neck for 4 days with pain.  Pt also has a lipoma to her right shoulder and reports that has been pain free for 2 years but now all of a sudden hurts as well.  Pt is not in any respiratory distress.  No difficulty swallowing but pt reports food feels like it is stuck going down.  Pt also reports pain to epigastric area with burning since yesterday.

## 2022-10-08 RX ORDER — LIDOCAINE 4 G/100G
1 CREAM TOPICAL ONCE
Refills: 0 | Status: COMPLETED | OUTPATIENT
Start: 2022-10-08 | End: 2022-10-08

## 2022-10-08 RX ORDER — CYCLOBENZAPRINE HYDROCHLORIDE 10 MG/1
1 TABLET, FILM COATED ORAL
Qty: 42 | Refills: 0
Start: 2022-10-08 | End: 2022-10-21

## 2022-10-08 RX ORDER — FAMOTIDINE 10 MG/ML
20 INJECTION INTRAVENOUS ONCE
Refills: 0 | Status: COMPLETED | OUTPATIENT
Start: 2022-10-08 | End: 2022-10-08

## 2022-10-08 RX ORDER — LIDOCAINE 4 G/100G
15 CREAM TOPICAL ONCE
Refills: 0 | Status: COMPLETED | OUTPATIENT
Start: 2022-10-08 | End: 2022-10-08

## 2022-10-08 RX ORDER — IBUPROFEN 200 MG
600 TABLET ORAL ONCE
Refills: 0 | Status: DISCONTINUED | OUTPATIENT
Start: 2022-10-08 | End: 2022-10-08

## 2022-10-08 RX ORDER — KETOROLAC TROMETHAMINE 30 MG/ML
15 SYRINGE (ML) INJECTION ONCE
Refills: 0 | Status: DISCONTINUED | OUTPATIENT
Start: 2022-10-08 | End: 2022-10-08

## 2022-10-08 RX ORDER — ACETAMINOPHEN 500 MG
975 TABLET ORAL ONCE
Refills: 0 | Status: COMPLETED | OUTPATIENT
Start: 2022-10-08 | End: 2022-10-08

## 2022-10-08 RX ORDER — CYCLOBENZAPRINE HYDROCHLORIDE 10 MG/1
10 TABLET, FILM COATED ORAL ONCE
Refills: 0 | Status: COMPLETED | OUTPATIENT
Start: 2022-10-08 | End: 2022-10-08

## 2022-10-08 RX ADMIN — CYCLOBENZAPRINE HYDROCHLORIDE 10 MILLIGRAM(S): 10 TABLET, FILM COATED ORAL at 03:23

## 2022-10-08 RX ADMIN — Medication 15 MILLIGRAM(S): at 03:17

## 2022-10-08 RX ADMIN — Medication 975 MILLIGRAM(S): at 03:17

## 2022-10-08 RX ADMIN — LIDOCAINE 15 MILLILITER(S): 4 CREAM TOPICAL at 03:16

## 2022-10-08 RX ADMIN — FAMOTIDINE 20 MILLIGRAM(S): 10 INJECTION INTRAVENOUS at 03:23

## 2022-10-08 RX ADMIN — Medication 30 MILLILITER(S): at 03:15

## 2022-10-08 RX ADMIN — LIDOCAINE 1 PATCH: 4 CREAM TOPICAL at 03:15

## 2022-10-08 NOTE — ED PROVIDER NOTE - OBJECTIVE STATEMENT
51 y/o F with PMHx of DM, CAD, CVA, HLD presenting with back swelling and epigastric pain. States she noticed swelling to B/L back R>L for a x1 week. Denies any trauma to the back or heavy lifting. States that she has been taking Ibuprofen for the pain. States she has a R shoulder lipoma for many years that has started to cause her pain. Denies any bowel/bladder dysfunction, recent travel, IVDU, saddle anesthesia. Denies any recent fever, chills, abdominal pain, nausea, vomiting, diarrhea, shortness of breath. States she has epigastric discomfort after she eats food. Attests to a burning sensation however when she does not eat she does not experience pain. Denies any extremity swelling. 51 y/o F with PMHx of DM, CAD, CVA, HLD presenting with back swelling and epigastric pain. States she noticed swelling to B/L back R>L for a x1 week. Denies any trauma to the back or heavy lifting. States that she has been taking Ibuprofen for the pain. States she has a R shoulder lipoma for many years that has started to cause her pain. States that the lipoma has been resected in past. Denies any bowel/bladder dysfunction, recent travel, IVDU, saddle anesthesia. Denies any recent fever, chills, abdominal pain, nausea, vomiting, diarrhea, shortness of breath. States she has epigastric discomfort after she eats food. Attests to a burning sensation however when she does not eat she does not experience pain. Denies any lower extremity swelling.

## 2022-10-08 NOTE — ED ADULT NURSE NOTE - OBJECTIVE STATEMENT
break RN: pt received to spot 12a. complains of epigastric pain and upper back/neck pain swelling. denies any trauma. denies n/v. reports epigastric pain only after eating. appears comfortable at this time.

## 2022-10-08 NOTE — ED PROVIDER NOTE - PROGRESS NOTE DETAILS
Willie Hanley DO (PGY1)  Patient states pain has improved however still has a headache. Discussed following up outpatient with general surgery for lipoma and ortho spine. Patient agrees with plan Willie Hanley DO (PGY1)  Patient states pain has improved however still has a headache. Discussed following up outpatient with general surgery for lipoma and ortho spine. Patient agrees with plan. Plan to send muscle relaxants to pharmacy.

## 2022-10-08 NOTE — ED PROVIDER NOTE - ATTENDING CONTRIBUTION TO CARE
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 No n/v or chest pains or sob    EXAM  +ttp to paravertebral muscles to upper thoracic and cervical spine  lipoma to R anterior shoulder  abd soft nt nd    EKG nsr with no acute ischemic abnormalities    a/p  MSK pain - meds  abd pain- likely gerd/gastritis/pud - meds  lipoma- chronic, not infected  re-eval symptoms after meds then likely dc with specialty f/u

## 2022-10-08 NOTE — ED PROVIDER NOTE - CLINICAL SUMMARY MEDICAL DECISION MAKING FREE TEXT BOX
51 y/o F with PMHx of DM, CAD, CVA, HLD presenting with back swelling and epigastric pain. States she noticed swelling to B/L back R>L for a x1 week.    Epigastric pain with burning sensation likely 2/2 GERD  EKG wnl  Pain control with ibuprofen 53 y/o F with PMHx of DM, CAD, CVA, HLD presenting with back swelling and epigastric pain. States she noticed swelling to B/L back R>L for a x1 week.    Epigastric pain with burning sensation likely 2/2 GERD  EKG wnl  Pain control with muscle relaxants. GI cocktail for epigastric discomfort  Plan for reassessment and f/u with spine and general surgery 53 y/o F with PMHx of DM, CAD, CVA, HLD presenting with back swelling and epigastric pain. States she noticed swelling to B/L back R>L for a x1 week.    Epigastric pain with burning sensation likely 2/2 GERD. +TTP to thoracic region. Patient with hx of chronic back pain.   EKG wnl  Back Pain control with muscle relaxant, Tylenol and IM Toradol. GI cocktail for epigastric discomfort (Maalox, Pepcid, Viscous Lidocaine)  Plan for reassessment and f/u with spine and general surgery outpatient 51 y/o F with PMHx of DM, CAD, CVA, HLD presenting with back swelling and epigastric pain. States she noticed swelling to B/L back R>L for a x1 week.    Epigastric pain with burning sensation likely 2/2 GERD. EKG wnl  +TTP to thoracic region. Patient with hx of chronic back pain.     Back Pain control with muscle relaxant, Tylenol and IM Toradol. GI cocktail for epigastric discomfort (Maalox, Pepcid, Viscous Lidocaine)  Plan for reassessment and f/u with spine and general surgery outpatient

## 2022-10-08 NOTE — ED PROVIDER NOTE - PATIENT PORTAL LINK FT
You can access the FollowMyHealth Patient Portal offered by [facility] by registering at the following website: [email or web address]. By joining [facility]’s FollowMyHealth portal, you will also be able to view your health information using other applications (apps) compatible with our system.

## 2022-10-08 NOTE — ED PROVIDER NOTE - NSFOLLOWUPCLINICS_GEN_ALL_ED_FT
[facility] - [facility]  Ortho/Spine  [address] [place] [address]  Phone: [phone]  Fax:     [facility] - [place]  Neurosurgery/Spine  [address], [address]  [place] [address]  Phone: [phone]  Fax:     [facility] - [facility]  Neurosurgery/Spine  [address] [place] [address]  Phone: [phone]  Fax:

## 2022-10-08 NOTE — ED PROVIDER NOTE - NSFOLLOWUPINSTRUCTIONS_ED_ALL_ED_FT
Your diagnosis: Back Pain     Discharge instructions:    - Please follow up with your Primary Care Doctor. Follow up with Ortho Spine and General Surgery.    - Tylenol up to 650 mg every 8 hours as needed for pain and/or Motrin up to 600 mg every 6 hours as needed for pain.  Please take the muscle relaxant sent to your pharmacy as directed.     - Be sure to return to the ED if you develop new or worsening symptoms. Specific signs and symptoms to be vigilant of: fever or chills, chest pain, difficulty breathing, palpitations, loss of consciousness, headache, vision changes, slurred speech, difficulty swallowing or drooling, facial droop, weakness in the arms or legs, numbness or tingling, abdominal pain, nausea or vomiting, diarrhea, constipation, blood in the stool or urine, pain on urination, difficulty urinating.      Back Pain    Back pain is very common in adults. The cause of back pain is rarely dangerous and the pain often gets better over time. The cause of your back pain may not be known and may include strain of muscles or ligaments, degeneration of the spinal disks, or arthritis. Occasionally the pain may radiate down your leg(s). Over-the-counter medicines to reduce pain and inflammation are often the most helpful. Stretching and remaining active frequently helps the healing process.     Low Back Strain  A strain is a stretch or tear in a muscle or the strong cords of tissue that attach muscle to bone (tendons). Strains of the lower back (lumbar spine) are a common cause of low back pain. A strain occurs when muscles or tendons are torn or are stretched beyond their limits. The muscles may become inflamed, resulting in pain and sudden muscle tightening (spasms). A strain can happen suddenly due to an injury (trauma), or it can develop gradually due to overuse.    What increases the risk?  The following factors may increase your risk of getting this condition:  Playing contact sports.  Participating in sports or activities that put excessive stress on the back and require a lot of bending and twisting, including:  Lifting weights or heavy objects, Gymnastics, Soccer, Figure skating, Snowboarding, Being overweight or obese, Having poor strength and flexibility.    What are the signs or symptoms?  Symptoms of this condition may include:  Sharp or dull pain in the lower back that does not go away. Pain may extend to the buttocks.  Stiffness.  Limited range of motion.  Inability to stand up straight due to stiffness or pain.  Muscle spasms.    How is this diagnosed?  This condition may be diagnosed based on:  Your symptoms, Your medical history, A physical exam, Your health care provider may push on certain areas of your back to determine the source of your pain. You may be asked to bend forward, backward, and side to side to assess the severity of your pain and your range of motion.  Imaging tests, such as:  X-rays, MRI.    How is this treated?  Treatment for this condition may include:  Applying heat and cold to the affected area.  Medicines to help relieve pain and to relax your muscles (muscle relaxants).  NSAIDs to help reduce swelling and discomfort.  Physical therapy.  When your symptoms improve, it is important to gradually return to your normal routine as soon as possible to reduce pain, avoid stiffness, and avoid loss of muscle strength. Generally, symptoms should improve within 6 weeks of treatment. However, recovery time varies.    Follow these instructions at home:  Managing pain, stiffness, and swelling     If directed, apply ice to the injured area during the first 24 hours after your injury.  Put ice in a plastic bag.  Place a towel between your skin and the bag.  Leave the ice on for 20 minutes, 2–3 times a day.  If directed, apply heat to the affected area as often as told by your health care provider. Use the heat source that your health care provider recommends, such as a moist heat pack or a heating pad.  Place a towel between your skin and the heat source.  Leave the heat on for 20–30 minutes.  Remove the heat if your skin turns bright red. This is especially important if you are unable to feel pain, heat, or cold. You may have a greater risk of getting burned.  Activity     Rest and return to your normal activities as told by your health care provider. Ask your health care provider what activities are safe for you.  Avoid activities that take a lot of effort (are strenuous) for as long as told by your health care provider.  Do exercises as told by your health care provider.  General instructions     Take over-the-counter and prescription medicines only as told by your health care provider.  If you have questions or concerns about safety while taking pain medicine, talk with your health care provider.  Do not drive or operate heavy machinery until you know how your pain medicine affects you.  Do not use any tobacco products, such as cigarettes, chewing tobacco, and e-cigarettes. Tobacco can delay bone healing. If you need help quitting, ask your health care provider.  Keep all follow-up visits as told by your health care provider. This is important.  How is this prevented?  Image Image Image Image Image   Warm up and stretch before being active.  Cool down and stretch after being active.  Give your body time to rest between periods of activity.  Avoid:  Being physically inactive for long periods at a time.  Exercising or playing sports when you are tired or in pain.  Use correct form when playing sports and lifting heavy objects.  Use good posture when sitting and standing.  Maintain a healthy weight.  Sleep on a mattress with medium firmness to support your back.  Make sure to use equipment that fits you, including shoes that fit well.  Be safe and responsible while being active to avoid falls.  Do at least 150 minutes of moderate-intensity exercise each week, such as brisk walking or water aerobics. Try a form of exercise that takes stress off your back, such as swimming or stationary cycling.  Maintain physical fitness, including:  Strength.  Flexibility.  Cardiovascular fitness.  Endurance.  Contact a health care provider if:  Your back pain does not improve after 6 weeks of treatment.  Your symptoms get worse.  Get help right away if:  Your back pain is severe.  You are unable to stand or walk.  You develop pain in your legs.  You develop weakness in your buttocks or legs.  You have difficulty controlling when you urinate or when you have a bowel movement.  This information is not intended to replace advice given to you by your health care provider. Make sure you discuss any questions you have with your health care provider.      SEEK IMMEDIATE MEDICAL CARE IF YOU HAVE ANY OF THE FOLLOWING SYMPTOMS: bowel or bladder control problems, unusual weakness or numbness in your arms or legs, nausea or vomiting, abdominal pain, fever, dizziness/lightheadedness.

## 2022-10-08 NOTE — ED PROVIDER NOTE - PHYSICAL EXAMINATION
Physical Exam:    Gen: NAD, AOx3, non-toxic appearing, able to ambulate without assistance  Head: NCAT  HEENT: EOMI, PEERLA, normal conjunctiva, tongue midline, oral mucosa moist  Lung: CTAB, no respiratory distress, no wheezes/rhonchi/rales B/L, speaking in full sentences  CV: RRR, no murmurs, rubs or gallops  Abd: soft, NT, ND, no guarding, no rigidity, no rebound tenderness, no CVA tenderness   MSK: +TTP to thoracic region B/L, no step off or crepitus   Neuro: No focal sensory or motor deficits  Skin: Warm, well perfused, no rash, no leg swelling Physical Exam:    Gen: NAD, AOx3, non-toxic appearing  Head: NCAT  HEENT: EOMI, PEERLA, normal conjunctiva, tongue midline, oral mucosa moist  Lung: CTAB, no respiratory distress, no wheezes/rhonchi/rales B/L, speaking in full sentences  CV: RRR, no murmurs, rubs or gallops  Abd: soft, NT, ND, no guarding, no rigidity, no rebound tenderness, no CVA tenderness   MSK: +TTP to thoracic region B/L, no step off or crepitus   Neuro: NV intact   Skin: Warm, well perfused, no rash, no leg swelling Physical Exam:    Gen: NAD, AOx3  Head: NCAT  HEENT: EOMI, PEERLA, normal conjunctiva, tongue midline  Lung: CTAB, no respiratory distress, no wheezes/rhonchi/rales B/L, speaking in full sentences  CV: RRR, no murmurs, rubs or gallops  Abd: soft, NT, ND, no guarding, no rigidity, no rebound tenderness, no CVA tenderness   MSK: +TTP to thoracic region B/L, no step off or crepitus, full range of motion of back B/L  Neuro: NV intact   Skin: Warm, well perfused, no rash, no leg swelling Physical Exam:    Gen: NAD, AOx3  Head: NCAT  HEENT: EOMI, PEERLA, normal conjunctiva, tongue midline  Lung: CTAB, no respiratory distress, no wheezes/rhonchi/rales B/L, speaking in full sentences  CV: RRR, no murmurs, rubs or gallops  Abd: soft, NT, ND, no guarding, no rigidity, no rebound tenderness, no CVA tenderness   MSK: +TTP to thoracic region B/L, no step off or crepitus, full range of motion of back B/L, anterior mobile R shoulder mass without tenderness or skin redness. NROM R shoulder  Neuro: NV intact   Skin: Warm, well perfused, no rash, no leg swelling

## 2022-10-08 NOTE — ED PROVIDER NOTE - BIRTH SEX
HISTORY & PHYSICAL    Date / Time of Admission:  9/12/2022  5:58 PM  Admitting Diagnosis: Hypertensive urgency [I16.0]    Chief Complaint:   Abdominal pain nausea vomiting and diarrhea    Subjective   Subjective:   HPI:     74-year-old female with history of intracranial hemorrhage, diabetes mellitus, NPH s/p  shunt, hypertension, hyperlipidemia, diabetes mellitus type 2, peripheral vascular disease and SFA occlusion unsuccessful attempt to revascularization presented with nausea vomiting and diarrhea for the last few weeks.  Patient also complains of abdominal pain mainly in the upper abdomen for the few days.  Patient was evaluated in the emergency room and found to have acute kidney injury, cholelithiasis with acute cholecystitis malignant hypertension.  Patient is receiving nicardipine drip to control blood pressure and is referred to general surgeon intensivist and nephrologist for consultation.  Patient denies any chest pain, shortness of breath.  As per  family member she has history of alcohol abuse.    Principal Problem:    Hypertensive urgency      PMHx:  She  has a past medical history of Ambulates with cane, Blood transfusion without reported diagnosis, Chronic kidney disease, Critical limb ischemia of left lower extremity (CMS/HCC), Diabetes mellitus (CMS/HCC), Essential (primary) hypertension, Heel sore, High cholesterol, Hydrocephalus in adult (CMS/HCC), PVD (peripheral vascular disease) (CMS/HCC), Stroke (CMS/HCC), Subdural hemorrhage (CMS/HCC), and Wears glasses.    She has no past medical history of Delayed emergence from anesthesia, Difficult intubation, Failed moderate sedation during procedure, Malignant hyperthermia, Motion sickness, PONV (postoperative nausea and vomiting), Pseudocholinesterase deficiency, or Spinal headache.     PSHx:  Her  has a past surgical history that includes back surgery and Ventriculoperitoneal shunt.     SHx:  She  reports that she has quit smoking. Her smoking use  included cigarettes. She smoked 0.00 packs per day. She has never used smokeless tobacco. She reports current alcohol use. She reports that she does not use drugs.      FHx:  Her family history includes Cancer, Colon in her mother.    Allergies: ALLERGIES:  Mold   (environmental)     MEDS:  [unfilled]    Review of Systems:  A 14 point comprehensive review of systems was negative, except as documented in HPI.       Objective   Objective:   VITALS:   Visit Vitals  BP (!) 163/61   Pulse (!) 101   Temp 98.1 °F (36.7 °C) (Oral)   Resp 14   SpO2 100%     EXAM:    General:  Alert, cooperative, no distress, appears stated age.     HEENT:   Normocephalic, without obvious abnormality,Conjunctivae/corneas clear. PERRL, EOMs intact. Septum midline.      Neck: Supple, symmetrical, trachea midline, no adenopathy, thyroid: no carotid bruit and no JVD.     Back:   Symmetric, no curvature. ROM normal. No CVA tenderness.     Lungs:   Clear to auscultation bilaterally.     Chest wall:  No tenderness or deformity.     Heart:  Regular rate and rhythm, S1, S2 normal, no murmur, click, rub or gallop.     Abdomen:   Soft, non-tender. Bowel sounds normal. No masses,  No organomegaly.         Extremities: Extremities normal, no cyanosis or edema.   Pulses: 2+ and symmetric all extremities.     Skin: Skin color, texture, turgor normal. No rashes or lesions       Neurologic: CNII-XII intact. Normal strength, sensation and reflexes throughout.       Data Review:    Imaging: CT scan and ultrasound of abdomen noted      Labs Reviewed   COMPREHENSIVE METABOLIC PANEL - Abnormal; Notable for the following components:       Result Value    Potassium 2.7 (*)     Carbon Dioxide 20 (*)     Glucose 106 (*)     BUN 46 (*)     Creatinine 5.77 (*)     Glomerular Filtration Rate 7 (*)     Albumin 2.9 (*)     Globulin 4.4 (*)     A/G Ratio 0.7 (*)     All other components within normal limits   URINALYSIS & REFLEX MICROSCOPY WITH CULTURE IF INDICATED -  Abnormal; Notable for the following components:    OCCULT BLOOD, URINALYSIS Trace (*)     PROTEIN, URINALYSIS 100  (*)     LEUKOCYTE ESTERASE, URINALYSIS Trace (*)     ERYTHROCYTES, URINALYSIS 3 to 5 (*)     All other components within normal limits   CBC WITH AUTOMATED DIFFERENTIAL (PERFORMABLE ONLY) - Abnormal; Notable for the following components:    RBC 3.55 (*)     HGB 11.7 (*)     HCT 34.1 (*)     Absolute Neutrophils 8.6 (*)     All other components within normal limits    Narrative:     This is an appended report.  These results have been appended to a previously verified report.   BASIC METABOLIC PANEL - Abnormal; Notable for the following components:    Carbon Dioxide 19 (*)     Glucose 103 (*)     BUN 39 (*)     Creatinine 4.78 (*)     Glomerular Filtration Rate 9 (*)     All other components within normal limits   MAGNESIUM - Abnormal; Notable for the following components:    Magnesium 3.1 (*)     All other components within normal limits   CBC NO DIFFERENTIAL (PERFORMABLE ONLY) - Abnormal; Notable for the following components:    WBC 11.3 (*)     RBC 3.38 (*)     HGB 11.1 (*)     HCT 33.5 (*)     All other components within normal limits   GLUCOSE, BEDSIDE - POINT OF CARE - Abnormal; Notable for the following components:    GLUCOSE, BEDSIDE - POINT OF CARE 119 (*)     All other components within normal limits   LIPASE - Normal   TROPONIN I, HIGH SENSITIVITY - Normal   MAGNESIUM - Normal   GLUCOSE, BEDSIDE - POINT OF CARE - Normal   CBC WITH DIFFERENTIAL    Narrative:     The following orders were created for panel order CBC with Automated Differential.  Procedure                               Abnormality         Status                     ---------                               -----------         ------                     CBC with Automated Dif...[[id number]]  Abnormal            Final result                 Please view results for these tests on the individual orders.   COVID/FLU/RSV PANEL   CBC NO  DIFFERENTIAL    Narrative:     The following orders were created for panel order CBC No Differential.  Procedure                               Abnormality         Status                     ---------                               -----------         ------                     CBC No Differential[[id number]]        Abnormal            Final result                 Please view results for these tests on the individual orders.   WBC STOOL   URINE, BACTERIAL CULTURE   C DIFFICILE TOXIN BY PCR   POCT METERED BLOOD GLUCOSE   POCT METERED BLOOD GLUCOSE   POCT METERED BLOOD GLUCOSE   POCT METERED BLOOD GLUCOSE   POCT METERED BLOOD GLUCOSE   POCT METERED BLOOD GLUCOSE   POCT METERED BLOOD GLUCOSE   POCT METERED BLOOD GLUCOSE       CBC:   Lab Results   Component Value Date    WBC 11.3 (H) 09/13/2022    WBC 11.8 (H) 11/30/2020    RBC 3.38 (L) 09/13/2022    RBC 3.14 (L) 11/30/2020     BMP:   Lab Results   Component Value Date    GLUCOSE 103 (H) 09/13/2022    GLUCOSE 76 08/05/2021    SODIUM 135 09/13/2022    SODIUM 135 (L) 08/05/2021    POTASSIUM 3.7 09/13/2022    POTASSIUM 4.5 08/05/2021    CHLORIDE 104 09/13/2022    CHLORIDE 104 08/05/2021    BUN 39 (H) 09/13/2022    BUN 16 08/05/2021    CREATININE 4.78 (H) 09/13/2022    CREATININE 1.00 08/05/2021    CALCIUM 9.2 09/13/2022    CALCIUM 10.3 (H) 08/05/2021     CMP:  Lab Results   Component Value Date    SODIUM 135 09/13/2022    SODIUM 135 (L) 08/05/2021    POTASSIUM 3.7 09/13/2022    POTASSIUM 4.5 08/05/2021    CHLORIDE 104 09/13/2022    CHLORIDE 104 08/05/2021    ANIONGAP 16 09/13/2022    ANIONGAP 9 08/05/2021    GLUCOSE 103 (H) 09/13/2022    GLUCOSE 76 08/05/2021    BUN 39 (H) 09/13/2022    BUN 16 08/05/2021    CREATININE 4.78 (H) 09/13/2022    CREATININE 1.00 08/05/2021    ALBUMIN 2.9 (L) 09/12/2022    ALBUMIN 4.3 08/05/2021    CALCIUM 9.2 09/13/2022    CALCIUM 10.3 (H) 08/05/2021    AST 13 09/12/2022    AST 18 08/05/2021    GFRNA 54 (L) 08/05/2021    GFRNA 89 11/30/2020     GFRA 66 08/05/2021     Coagulation:   Lab Results   Component Value Date    INR 1.0 01/18/2021    INR 1.0 10/20/2020    PTT 26 01/18/2021    PTT 25 10/20/2020     Cardiac markers:   Lab Results   Component Value Date     11/30/2020     ABGs: No results found for: PHARTERIAL  Mg: No results found for: MAGNESIUM  BNP: No results found for: BNP  Thyroid:   Lab Results   Component Value Date    TSH 2.078 11/02/2021    TSH 0.784 11/30/2020       Outside reports reviewed: ER records.         Assessment   Assessment:   Acute kidney injury  Malignant hypertension  Hypertensive Emergency  cholelithiasis with acute cholecystitis  Requiring hemodialysis  Hypertension  Diabetes mellitus type 2  Hyperlipidemia  Peripheral arterial disease  NPH  status post  shunt      [unfilled]    Plan   PLAN:   Patient is admitted to medical stepdown  Patient is receiving IV fluid IV antibiotic, IV nicardipine  D/W with patient's brother about her diagnosis and treatment plan  cBC CMP tomorrow           By:  Terese Elliott MD, 9/13/2022, 2:53 PM    Primary Care Physician:  Puma Vora MD      Female

## 2022-10-08 NOTE — ED PROVIDER NOTE - NS ED ROS FT
CONSTITUTIONAL: No fevers, no chills  EYES: no visual changes, no eye pain  EARS: no ear drainage, no ear pain, no change in hearing  NOSE: no nasal congestion  MOUTH/THROAT: no sore throat  CV: No chest pain, no palpitations  RESP: No SOB, no cough  GI: +EPIGASTRIC PAIN  : no dysuria, no hematuria, no flank pain  MSK: +BACK PAIN  SKIN: no rashes CONSTITUTIONAL: No fevers, no chills  EYES: no visual changes, no eye pain  EARS: no ear drainage, no ear pain, no change in hearing  NOSE: no nasal congestion  MOUTH/THROAT: no sore throat  CV: No chest pain, no palpitations  RESP: No SOB, no cough  GI: +EPIGASTRIC PAIN  : no dysuria, no hematuria, no flank pain  MSK: +BACK PAIN,  SKIN: no rashes

## 2022-10-19 ENCOUNTER — LABORATORY RESULT (OUTPATIENT)
Age: 52
End: 2022-10-19

## 2022-10-19 ENCOUNTER — OUTPATIENT (OUTPATIENT)
Dept: OUTPATIENT SERVICES | Facility: HOSPITAL | Age: 52
LOS: 1 days | End: 2022-10-19

## 2022-10-19 ENCOUNTER — APPOINTMENT (OUTPATIENT)
Dept: OBGYN | Facility: HOSPITAL | Age: 52
End: 2022-10-19

## 2022-10-19 VITALS
BODY MASS INDEX: 32.33 KG/M2 | HEART RATE: 102 BPM | DIASTOLIC BLOOD PRESSURE: 67 MMHG | WEIGHT: 189.4 LBS | TEMPERATURE: 97.9 F | SYSTOLIC BLOOD PRESSURE: 110 MMHG | HEIGHT: 64 IN

## 2022-10-19 DIAGNOSIS — R22.30 LOCALIZED SWELLING, MASS AND LUMP, UNSPECIFIED UPPER LIMB: Chronic | ICD-10-CM

## 2022-10-19 DIAGNOSIS — D25.9 LEIOMYOMA OF UTERUS, UNSPECIFIED: ICD-10-CM

## 2022-10-19 PROCEDURE — 58100 BIOPSY OF UTERUS LINING: CPT | Mod: GC

## 2022-10-19 PROCEDURE — 99213 OFFICE O/P EST LOW 20 MIN: CPT | Mod: 25,GC

## 2022-10-19 RX ORDER — IBUPROFEN 400 MG/1
400 TABLET, FILM COATED ORAL
Qty: 0 | Refills: 0 | Status: COMPLETED | OUTPATIENT
Start: 2022-10-19

## 2022-10-19 RX ADMIN — IBUPROFEN 0 MG: 600 TABLET ORAL at 00:00

## 2022-10-21 DIAGNOSIS — N93.9 ABNORMAL UTERINE AND VAGINAL BLEEDING, UNSPECIFIED: ICD-10-CM

## 2022-10-24 DIAGNOSIS — D25.9 LEIOMYOMA OF UTERUS, UNSPECIFIED: ICD-10-CM

## 2022-10-24 LAB
HCG UR QL: NEGATIVE
QUALITY CONTROL: YES

## 2022-10-25 ENCOUNTER — APPOINTMENT (OUTPATIENT)
Dept: ORTHOPEDIC SURGERY | Facility: CLINIC | Age: 52
End: 2022-10-25

## 2022-10-25 VITALS
WEIGHT: 189 LBS | HEART RATE: 101 BPM | HEIGHT: 64 IN | DIASTOLIC BLOOD PRESSURE: 87 MMHG | SYSTOLIC BLOOD PRESSURE: 122 MMHG | BODY MASS INDEX: 32.27 KG/M2

## 2022-10-25 DIAGNOSIS — M54.9 DORSALGIA, UNSPECIFIED: ICD-10-CM

## 2022-10-25 DIAGNOSIS — G89.29 LUMBAGO WITH SCIATICA, LEFT SIDE: ICD-10-CM

## 2022-10-25 DIAGNOSIS — M54.42 LUMBAGO WITH SCIATICA, LEFT SIDE: ICD-10-CM

## 2022-10-25 DIAGNOSIS — M51.36 OTHER INTERVERTEBRAL DISC DEGENERATION, LUMBAR REGION: ICD-10-CM

## 2022-10-25 DIAGNOSIS — G89.29 DORSALGIA, UNSPECIFIED: ICD-10-CM

## 2022-10-25 PROCEDURE — 72070 X-RAY EXAM THORAC SPINE 2VWS: CPT

## 2022-10-25 PROCEDURE — 72100 X-RAY EXAM L-S SPINE 2/3 VWS: CPT

## 2022-10-25 PROCEDURE — 99215 OFFICE O/P EST HI 40 MIN: CPT

## 2022-10-25 RX ORDER — OMEPRAZOLE 40 MG/1
40 CAPSULE, DELAYED RELEASE ORAL
Qty: 30 | Refills: 1 | Status: ACTIVE | COMMUNITY
Start: 2022-10-25 | End: 1900-01-01

## 2022-10-26 ENCOUNTER — APPOINTMENT (OUTPATIENT)
Dept: OBGYN | Facility: HOSPITAL | Age: 52
End: 2022-10-26

## 2022-10-26 NOTE — DISCUSSION/SUMMARY
[Medication Risks Reviewed] : Medication risks reviewed [de-identified] : She saw Dr. Fish for foot pain in August and was started on diclofenac without relief.  She was started on ibuprofen 800 mg 3 times a day as a nonsteroidal anti-inflammatory.  She will use moist heat.  She was started on omeprazole 40 mg once a day for GI protection.  She will call if there are problems with the medication or worsening of her symptoms and I will see her for follow-up in 4 weeks.

## 2022-10-26 NOTE — REASON FOR VISIT
[Initial Visit] : an initial visit for [Degenerative Joint Disease] : degenerative joint disease [Back Pain] : back pain [Neck Pain] : neck pain [Radiculopathy] : radiculopathy [Spouse] : spouse

## 2022-10-26 NOTE — REVIEW OF SYSTEMS
[Heartburn] : heartburn [Negative] : Genitourinary [FreeTextEntry9] : Recurrent lipoma of the right shoulder under the care of a general surgeon

## 2022-10-26 NOTE — HISTORY OF PRESENT ILLNESS
[de-identified] : This 52-year-old woman started with symptoms of intermittent back pain 8 years ago.  The symptoms occur roughly twice a year with radiation to both legs over the last few months.  1 month ago the back pain increased.  She has had some swelling in her feet.  She has also had some mid back pain.  She grades the low back pain as a 7 and the mid back pain is an 8.  The pain radiating to the left posterior thigh is graded as a 6.  There is no pain below the knee.  She has had some occasional tingling in the left leg but denies numbness.  The pain is no worse coughing, sneezing or forcing to move her bowels.  There is some night pain in the mid back.  The lower back pain is worse with prolonged sitting and occasionally worse walking but no worse standing.  The pain is worse lying down.  She was seen in the emergency room at [facility] where muscle relaxants were prescribed.  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  She has seen a surgeon about a work-up of that problem.  She is diabetic on glipizide and a statin as well as omeprazole which seems to control reflux symptoms. [Worsening] : worsening [8] : a maximum pain level of 8/10 [Sitting] : sitting [Standing] : standing

## 2022-10-26 NOTE — PHYSICAL EXAM
[de-identified] : She is fully alert and oriented with a normal mood and affect.  She is in no acute distress as I take the history.  She is overweight.  She ambulates with a normal gait including tiptoe and heel walking.  There is no evidence of unsteadiness or spasticity.  There are no cutaneous abnormalities or palpable bony defects of the spine.  There is no evidence of shortness of breath or respiratory distress.  There is no paravertebral muscle spasm or trochanteric tenderness.  There is mild bilateral sciatic notch sensitivity.  She has a mildly increased thoracic kyphosis.  Forward flexion of the spine at 70 degrees causes lower back pain.  Her lower extremity neurological examination revealed 1+ symmetrical reflexes.  Toes are downgoing.  Motor power is normal to manual testing in all lower extremity groups and sensation is normal to light touch in all dermatomes.  Straight leg raising is negative to 90 degrees in the sitting position.  There is a large lipomatous appearing mass about the right shoulder.  Vascular examination shows no evidence of significant varicosities and there is no lymphedema.  The only cutaneous abnormalities are a scar at the right shoulder from removal of the prior lipoma. [de-identified] : I reviewed outside x-rays of the cervical spine from May of this year that revealed mild degenerative changes.  I reviewed an MRI of the cervical spine from 2018 that revealed a right-sided herniated cervical disc at the C6-7 level.  I reviewed an MRI of the lumbar spine from 2018 that appeared relatively normal for her age.\par \par In light of her current complaints I obtained AP and lateral x-rays of the thoracic spine.  There are moderate multilevel degenerative changes.  There is no evidence of a fracture or destructive change.\par \par I obtained AP and flexion-extension lateral x-rays of the lumbar spine that revealed a mild scoliosis.  Sagittal alignment was normal.  There was no evidence of any instability.  There are changes of facet arthrosis in the lower lumbar spine.

## 2022-11-01 NOTE — DISCUSSION/SUMMARY
[FreeTextEntry1] : Fellow Addendum:\par \par Ms. Rogel is a 51yo P2 seen for management of AUB and pelvic pain. Known hx of uterine fibroids. She  is [place] but declined an  for our discussion. Her chart and symptoms reviewed. She is very concerned about bleeding and anemia. She has had AUB in the past and states that she had an endometrial biopsy performed at [facility] three years prior but did not proceed with management at that time. We reviewed her medical hx which is significant for CAD, DM, HLD, and chronic back pain. She is unsure at this time if she would like to proceed with medical management vs surgery. We discussed her options including progesterone,  IUD, myomectomy, hysterectomy, UAE, expct mgmt. All questions were answered to her apparent satisfaction. She would like to discuss with her PCP and family as well and consider. Discussed that she would need medical and cardiac clearance including optimization of A1c, H/H. Cardiac workup in 2020 was negative but may need to be repeated. She expressed understanding. Asked her to bring records if not in the [facility] system. She will make follow up appointment based on her plans. Endometrial biopsy performed today 2/2 AUB. Explained that regardless of management plan, hyperplasia/malignancy should be evaluated.\par \par -recommended iron/vit C supplementation\par -follow up path\par -follow up plan with patient\par -medical/cardiac clearances\par \par D/w Dr. Hopson\par NORMAN Quinn [id number]

## 2022-11-01 NOTE — PLAN
[FreeTextEntry1] : Patient is a 51yo  presenting to booking clinic for concerns of AUB and wishing to discuss surgical management of fibroids/AUB.\par \par #AUB/Uterine fibroids\par - The patient was extensively counseled on the procedures, risks, benefits of hysterectomy, myomectomy, medical management, IUD placement, and expectant management of her heavy menstrual bleeding\par - The patient is likely close to menopause and was offered the option of waiting for menstruation to cease vs. surgical or medical management before that time\par - EMB performed, patient tolerated moderately well. Scant tissue. Given ibuprofen postprocedure\par - Pt is seeing her PCP in clinic on Friday, wishes to discuss these options with her PCP and get their opinion before making a decision\par - Pt will call to make a f/u appointment at the clinic when she has made a decision on management\par \par Jessie Rivera, PGY1\par d/w Dr. Quinn and Dr. Hopson

## 2022-11-01 NOTE — HISTORY OF PRESENT ILLNESS
[FreeTextEntry1] : Patient is a 51yo  seen in the booking clinic for management of her abnormal uterine bleeding and pelvic pain.\par \par Patient reports that for the past few months, her period has been especially heavy and she has been getting more severe pelvic pain before her periods. During the first day or two of her last few periods she has also been passing large blood clots. Her periods are still regular, most recently from Oct 2-. She is not having intermenstrual bleeding. She is concerned about the bleeding and wished to discuss options of myomectomy vs. hysterectomy vs. medical management. She has a known history of uterine fibroids.\par \par TVUS ()\par - Uterus 11 x 9 x 9.3\par - R submucosal fibroid 4.7 x 3.9 x 5.7 cm\par - L submucosal fibroid 4.4 x 4.6 x 4.5 cm\par \par ObHx: NSVDx2\par GynHx:\par - fibroids (see above)\par - LMP 10/2/22, regular, heavy bleeding, pelvic pain, no intermenstrual bleeding\par - Denies h/o ovarian cysts, abnormal pap, STIs\par PMH: diabetes, CAD, HLD, chronic back pain\par PSH: L shoulder lipoma surgery\par Meds: Glypizide, januvia, omeprazole, muscle relaxant, iron, simvastatin, oxycodone\par All: None\par SH: Denies T/A/D

## 2022-11-01 NOTE — PROCEDURE
[Endometrial Biopsy] : Endometrial biopsy [Consent Obtained] : Consent obtained [Pre-op Evaluation] : Pre-op evaluation [Irregular Bleeding] : irregular uterine bleeding [Risks] : risks [Benefits] : benefits [Alternatives] : alternatives [Patient] : patient [Infection] : infection [Bleeding] : bleeding [Uterine Perforation] : uterine perforation [Pain] : pain [Negative] : negative pregnancy test [Betadine] : Betadine [None] : none [Ring Forceps] : Ring forceps [Easy Passage] : Easy passage [Anteverted] : anteverted [Scant] : scant [Sent to Pathology] : placed in buffered formalin and sent for pathology [No Complications] : No complications [LMPDate] : 10/02/2022 [de-identified] : patient with significant comfort. Asked to stop before enough tissue obtained

## 2022-11-01 NOTE — REVIEW OF SYSTEMS
[Abn Vaginal bleeding] : abnormal vaginal bleeding [Pelvic pain] : pelvic pain [Fever] : no fever [Chills] : no chills [Fatigue] : no fatigue [Dyspnea] : no dyspnea [Chest Pain] : no chest pain [Abdominal Pain] : no abdominal pain

## 2022-11-07 ENCOUNTER — NON-APPOINTMENT (OUTPATIENT)
Age: 52
End: 2022-11-07

## 2022-11-08 ENCOUNTER — NON-APPOINTMENT (OUTPATIENT)
Age: 52
End: 2022-11-08

## 2022-11-22 ENCOUNTER — APPOINTMENT (OUTPATIENT)
Dept: ORTHOPEDIC SURGERY | Facility: CLINIC | Age: 52
End: 2022-11-22

## 2022-12-13 ENCOUNTER — EMERGENCY (EMERGENCY)
Facility: HOSPITAL | Age: 52
LOS: 1 days | Discharge: HOME | End: 2022-12-13
Attending: EMERGENCY MEDICINE | Admitting: EMERGENCY MEDICINE

## 2022-12-13 VITALS
TEMPERATURE: 98 F | DIASTOLIC BLOOD PRESSURE: 70 MMHG | HEART RATE: 85 BPM | RESPIRATION RATE: 18 BRPM | OXYGEN SATURATION: 98 % | SYSTOLIC BLOOD PRESSURE: 130 MMHG

## 2022-12-13 DIAGNOSIS — R22.30 LOCALIZED SWELLING, MASS AND LUMP, UNSPECIFIED UPPER LIMB: Chronic | ICD-10-CM

## 2022-12-13 PROCEDURE — 73030 X-RAY EXAM OF SHOULDER: CPT | Mod: 26,LT

## 2022-12-13 PROCEDURE — 73110 X-RAY EXAM OF WRIST: CPT | Mod: 26,LT

## 2022-12-13 PROCEDURE — 73200 CT UPPER EXTREMITY W/O DYE: CPT | Mod: 26,LT,MG

## 2022-12-13 PROCEDURE — 72125 CT NECK SPINE W/O DYE: CPT | Mod: 26,MG

## 2022-12-13 PROCEDURE — 73060 X-RAY EXAM OF HUMERUS: CPT | Mod: 26,LT

## 2022-12-13 PROCEDURE — G1004: CPT

## 2022-12-13 PROCEDURE — 71101 X-RAY EXAM UNILAT RIBS/CHEST: CPT | Mod: 26,LT

## 2022-12-13 PROCEDURE — 99285 EMERGENCY DEPT VISIT HI MDM: CPT

## 2022-12-13 PROCEDURE — 73080 X-RAY EXAM OF ELBOW: CPT | Mod: 26,LT

## 2022-12-13 PROCEDURE — 73090 X-RAY EXAM OF FOREARM: CPT | Mod: 26,LT

## 2022-12-13 RX ORDER — LIDOCAINE 4 G/100G
1 CREAM TOPICAL ONCE
Refills: 0 | Status: COMPLETED | OUTPATIENT
Start: 2022-12-13 | End: 2022-12-13

## 2022-12-13 RX ORDER — KETOROLAC TROMETHAMINE 30 MG/ML
15 SYRINGE (ML) INJECTION ONCE
Refills: 0 | Status: DISCONTINUED | OUTPATIENT
Start: 2022-12-13 | End: 2022-12-13

## 2022-12-13 RX ORDER — OXYCODONE HYDROCHLORIDE 5 MG/1
5 TABLET ORAL ONCE
Refills: 0 | Status: DISCONTINUED | OUTPATIENT
Start: 2022-12-13 | End: 2022-12-13

## 2022-12-13 RX ORDER — DIAZEPAM 5 MG
2 TABLET ORAL ONCE
Refills: 0 | Status: DISCONTINUED | OUTPATIENT
Start: 2022-12-13 | End: 2022-12-13

## 2022-12-13 RX ORDER — IBUPROFEN 200 MG
400 TABLET ORAL ONCE
Refills: 0 | Status: COMPLETED | OUTPATIENT
Start: 2022-12-13 | End: 2022-12-13

## 2022-12-13 RX ORDER — ACETAMINOPHEN 500 MG
975 TABLET ORAL ONCE
Refills: 0 | Status: COMPLETED | OUTPATIENT
Start: 2022-12-13 | End: 2022-12-13

## 2022-12-13 RX ADMIN — Medication 2 MILLIGRAM(S): at 23:04

## 2022-12-13 RX ADMIN — LIDOCAINE 1 PATCH: 4 CREAM TOPICAL at 16:42

## 2022-12-13 RX ADMIN — Medication 400 MILLIGRAM(S): at 16:42

## 2022-12-13 RX ADMIN — OXYCODONE HYDROCHLORIDE 5 MILLIGRAM(S): 5 TABLET ORAL at 18:03

## 2022-12-13 RX ADMIN — LIDOCAINE 1 PATCH: 4 CREAM TOPICAL at 16:52

## 2022-12-13 RX ADMIN — Medication 975 MILLIGRAM(S): at 16:42

## 2022-12-13 RX ADMIN — OXYCODONE HYDROCHLORIDE 5 MILLIGRAM(S): 5 TABLET ORAL at 20:45

## 2022-12-13 NOTE — ED ADULT NURSE REASSESSMENT NOTE - NS ED NURSE REASSESS COMMENT FT1
Pt a&ox4, ambulatory upon arrival. Pt reports falling down 14 steps this morning, denies head injury or LOC. Pt reports L shoulder pain and lower back pain. Pt L shoulder has limited range of motion, pulse and sensory present, extremity is warm and skin color is appropriate. Pt denies other injuries or pain. Pt medicated as per orders.

## 2022-12-13 NOTE — ED PROVIDER NOTE - ATTENDING CONTRIBUTION TO CARE
Patient with history of diabetes, chronic back pain presents emergency department with left shoulder and lateral neck pain after tripping and falling down her approximately 14-15 steps.  She states she had a left side of her head, no loss of consciousness.  Has been ambulatory since then but has significant amount of left shoulder pain.  Denies nausea, vomiting, abdominal pain, acute worsening of back pain that is chronic.  She has a nonfocal neurological exam and no midline spinal tenderness to palpation, however strength exam somewhat limited to the left upper extremity given patient has significant pain with ranging left shoulder.  Able to range her left elbow and her wrist, she has complete  strength and full flexion extension of the elbow.  No chest wall tenderness, normal cardiopulmonary exam. Initially there was concern for humeral head fracture left shoulder dislocation however plain films do not reveal any acute fracture or injury to the left ribs, clavicle, shoulder, humerus, elbow, forearm, wrist.  Patient still with significant degree of pain, plan for CT of the shoulder and cervical spine as this may be cervical radicular referred pain.  Patient signed out pending CT scans for evaluation.

## 2022-12-13 NOTE — ED ADULT TRIAGE NOTE - CHIEF COMPLAINT QUOTE
p/t c/o of lt sided arm and shoulder pain s/p slip and fall this am neg loc, p/t ambulatory, no neuro deficits noted

## 2022-12-13 NOTE — ED PROVIDER NOTE - OBJECTIVE STATEMENT
52-year-old woman with PMH HLD, DM2, chronic back pain, presenting with left arm pain that started after patient tumbled down 15 stair steps. Patient states that she missed a step and lost her balance. +head trauma, no LOC. Ambulating on her own at this time. Denies vision blurriness, headache, changes in neck ROM.

## 2022-12-13 NOTE — ED PROVIDER NOTE - PHYSICAL EXAMINATION
Gen: NAD, AOx3, able to make needs known, non-toxic  HEENT: EOMI, oral mucosa moist, normal conjunctiva. No head trauma visualized, no cervical spine tenderness. +right paraspinal tenderness   CV: pulses bilaterally, no sternal tenderness, no clavicle tenderness   Abd: soft, NTND, no guarding, no CVA tenderness   MSK: no visible bony deformities, no spinal tenderness, no tenderness with palpation of the right UE and bilateral LE, no hip or pelvis tenderness. +tenderness over the left shoulder and humerus, unable to tolerate active or passive ROM of the shoulder, elbow ROM intact   Neuro: No focal sensory or motor deficits  Skin: Warm, well perfused, no rash, no bruises, large lipoma over the right anterior shoulder that is unchanged in size post-fall   Psych: normal affect

## 2022-12-13 NOTE — ED PROVIDER NOTE - PATIENT PORTAL LINK FT
You can access the FollowMyHealth Patient Portal offered by [facility] by registering at the following website: [email or web address]. By joining [facility]’s FollowMyHealth portal, you will also be able to view your health information using other applications (apps) compatible with our system.

## 2022-12-13 NOTE — ED PROVIDER NOTE - NS ED ROS FT
GENERAL: No fever, no chills  EYES: No change in vision  HEENT: No trouble swallowing or speaking  CARDIAC: No chest pain  PULMONARY: No cough, no SOB  GI: No abdominal pain, no nausea, no vomiting, no diarrhea, no constipation  : No changes in urination  SKIN: No rashes  NEURO: No headache, no numbness  MSK: +right shoulder pain  Otherwise as HPI or negative.

## 2022-12-13 NOTE — ED PROVIDER NOTE - CARE PROVIDER_API CALL
Tera Nice)  Orthopedics  [address], [address]  [place] [address]  Phone: [phone]  Fax: [phone]  Follow Up Time: 4-6 Days

## 2022-12-13 NOTE — ED PROVIDER NOTE - PROGRESS NOTE DETAILS
Isidro, PGY2 - no fractures seen on xray. Patient states that she has had no pain relief post oxycodone.  We will pursue a CAT scan of her left upper extremity to rule out a fracture that was missed. reassess.

## 2022-12-13 NOTE — ED PROVIDER NOTE - NSFOLLOWUPINSTRUCTIONS_ED_ALL_ED_FT
1) Follow up with your primary care for further evaluation  2) Return to the ED immediately for new or worsening symptoms including chest pain, shortness of breath, nausea/vomiting or dizziness.  3) Please continue to take any home medications as prescribed. Take Tylenol 325 mg every 4 hours for pain relief/fever control or ibuprofen 600 mg every 6 hours for pain relief/fever control. Take prescribed medication with food. Do not drive on medications  4) Your test results from your ED visit were discussed with you prior to discharge  5) You were provided with a copy of your test results

## 2022-12-13 NOTE — ED PROVIDER NOTE - CLINICAL SUMMARY MEDICAL DECISION MAKING FREE TEXT BOX
Isidro, PGY2 - 52-year-old woman presenting with left arm pain post fall down stairs. X-ray, meds, reassess. Will not pursue a CT head or cervical spine at this time due to no cranial hematoma or injury noted, no cervical spine tenderness, pain paraspinal in nature. *The above represents an initial assessment/impression. Please refer to progress notes for potential changes in patient clinical course*

## 2022-12-14 VITALS
TEMPERATURE: 99 F | SYSTOLIC BLOOD PRESSURE: 119 MMHG | RESPIRATION RATE: 16 BRPM | DIASTOLIC BLOOD PRESSURE: 77 MMHG | HEART RATE: 80 BPM | OXYGEN SATURATION: 99 %

## 2022-12-14 LAB
ALBUMIN SERPL ELPH-MCNC: 4.3 G/DL — SIGNIFICANT CHANGE UP (ref 3.3–5)
ALP SERPL-CCNC: 58 U/L — SIGNIFICANT CHANGE UP (ref 40–120)
ALT FLD-CCNC: 21 U/L — SIGNIFICANT CHANGE UP (ref 4–33)
ANION GAP SERPL CALC-SCNC: 13 MMOL/L — SIGNIFICANT CHANGE UP (ref 7–14)
AST SERPL-CCNC: 18 U/L — SIGNIFICANT CHANGE UP (ref 4–32)
BASOPHILS # BLD AUTO: 0.04 K/UL — SIGNIFICANT CHANGE UP (ref 0–0.2)
BASOPHILS NFR BLD AUTO: 0.5 % — SIGNIFICANT CHANGE UP (ref 0–2)
BILIRUB SERPL-MCNC: <0.2 MG/DL — SIGNIFICANT CHANGE UP (ref 0.2–1.2)
BUN SERPL-MCNC: 13 MG/DL — SIGNIFICANT CHANGE UP (ref 7–23)
CALCIUM SERPL-MCNC: 9.5 MG/DL — SIGNIFICANT CHANGE UP (ref 8.4–10.5)
CHLORIDE SERPL-SCNC: 98 MMOL/L — SIGNIFICANT CHANGE UP (ref 98–107)
CO2 SERPL-SCNC: 24 MMOL/L — SIGNIFICANT CHANGE UP (ref 22–31)
CREAT SERPL-MCNC: 0.8 MG/DL — SIGNIFICANT CHANGE UP (ref 0.5–1.3)
CRP SERPL-MCNC: 4.8 MG/L — SIGNIFICANT CHANGE UP
EGFR: 88 ML/MIN/1.73M2 — SIGNIFICANT CHANGE UP
EOSINOPHIL # BLD AUTO: 0.5 K/UL — SIGNIFICANT CHANGE UP (ref 0–0.5)
EOSINOPHIL NFR BLD AUTO: 6.6 % — HIGH (ref 0–6)
ERYTHROCYTE [SEDIMENTATION RATE] IN BLOOD: 25 MM/HR — SIGNIFICANT CHANGE UP (ref 4–25)
GLUCOSE SERPL-MCNC: 216 MG/DL — HIGH (ref 70–99)
HCT VFR BLD CALC: 38.9 % — SIGNIFICANT CHANGE UP (ref 34.5–45)
HGB BLD-MCNC: 13.2 G/DL — SIGNIFICANT CHANGE UP (ref 11.5–15.5)
IANC: 3 K/UL — SIGNIFICANT CHANGE UP (ref 1.8–7.4)
IMM GRANULOCYTES NFR BLD AUTO: 0.1 % — SIGNIFICANT CHANGE UP (ref 0–0.9)
LYMPHOCYTES # BLD AUTO: 3.52 K/UL — HIGH (ref 1–3.3)
LYMPHOCYTES # BLD AUTO: 46.5 % — HIGH (ref 13–44)
MCHC RBC-ENTMCNC: 30.8 PG — SIGNIFICANT CHANGE UP (ref 27–34)
MCHC RBC-ENTMCNC: 33.9 GM/DL — SIGNIFICANT CHANGE UP (ref 32–36)
MCV RBC AUTO: 90.7 FL — SIGNIFICANT CHANGE UP (ref 80–100)
MONOCYTES # BLD AUTO: 0.5 K/UL — SIGNIFICANT CHANGE UP (ref 0–0.9)
MONOCYTES NFR BLD AUTO: 6.6 % — SIGNIFICANT CHANGE UP (ref 2–14)
NEUTROPHILS # BLD AUTO: 3 K/UL — SIGNIFICANT CHANGE UP (ref 1.8–7.4)
NEUTROPHILS NFR BLD AUTO: 39.7 % — LOW (ref 43–77)
NRBC # BLD: 0 /100 WBCS — SIGNIFICANT CHANGE UP (ref 0–0)
NRBC # FLD: 0 K/UL — SIGNIFICANT CHANGE UP (ref 0–0)
PLATELET # BLD AUTO: 274 K/UL — SIGNIFICANT CHANGE UP (ref 150–400)
POTASSIUM SERPL-MCNC: 3.5 MMOL/L — SIGNIFICANT CHANGE UP (ref 3.5–5.3)
POTASSIUM SERPL-SCNC: 3.5 MMOL/L — SIGNIFICANT CHANGE UP (ref 3.5–5.3)
PROT SERPL-MCNC: 7.4 G/DL — SIGNIFICANT CHANGE UP (ref 6–8.3)
RBC # BLD: 4.29 M/UL — SIGNIFICANT CHANGE UP (ref 3.8–5.2)
RBC # FLD: 13 % — SIGNIFICANT CHANGE UP (ref 10.3–14.5)
SODIUM SERPL-SCNC: 135 MMOL/L — SIGNIFICANT CHANGE UP (ref 135–145)
WBC # BLD: 7.57 K/UL — SIGNIFICANT CHANGE UP (ref 3.8–10.5)
WBC # FLD AUTO: 7.57 K/UL — SIGNIFICANT CHANGE UP (ref 3.8–10.5)

## 2022-12-14 PROCEDURE — 73610 X-RAY EXAM OF ANKLE: CPT | Mod: 26,LT

## 2022-12-14 PROCEDURE — 73552 X-RAY EXAM OF FEMUR 2/>: CPT | Mod: 26,LT

## 2022-12-14 PROCEDURE — 73502 X-RAY EXAM HIP UNI 2-3 VIEWS: CPT | Mod: 26,RT

## 2022-12-14 RX ORDER — CYCLOBENZAPRINE HYDROCHLORIDE 10 MG/1
1 TABLET, FILM COATED ORAL
Qty: 15 | Refills: 0
Start: 2022-12-14 | End: 2022-12-18

## 2022-12-14 RX ORDER — OXYCODONE AND ACETAMINOPHEN 5; 325 MG/1; MG/1
1 TABLET ORAL ONCE
Refills: 0 | Status: DISCONTINUED | OUTPATIENT
Start: 2022-12-14 | End: 2022-12-14

## 2022-12-14 RX ORDER — OXYCODONE AND ACETAMINOPHEN 5; 325 MG/1; MG/1
1 TABLET ORAL
Qty: 9 | Refills: 0
Start: 2022-12-14 | End: 2022-12-16

## 2022-12-14 RX ADMIN — Medication 15 MILLIGRAM(S): at 00:55

## 2022-12-14 RX ADMIN — Medication 15 MILLIGRAM(S): at 04:55

## 2022-12-14 RX ADMIN — Medication 400 MILLIGRAM(S): at 04:55

## 2022-12-14 RX ADMIN — Medication 975 MILLIGRAM(S): at 04:55

## 2022-12-14 RX ADMIN — OXYCODONE AND ACETAMINOPHEN 1 TABLET(S): 5; 325 TABLET ORAL at 04:41

## 2022-12-14 NOTE — ED ADULT NURSE NOTE - NSIMPLEMENTINTERV_GEN_ALL_ED
Implemented All Fall Risk Interventions:  [place] to call system. Call bell, personal items and telephone within reach. Instruct patient to call for assistance. Room bathroom lighting operational. Non-slip footwear when patient is off stretcher. Physically safe environment: no spills, clutter or unnecessary equipment. Stretcher in lowest position, wheels locked, appropriate side rails in place. Provide visual cue, wrist band, yellow gown, etc. Monitor gait and stability. Monitor for mental status changes and reorient to person, place, and time. Review medications for side effects contributing to fall risk. Reinforce activity limits and safety measures with patient and family.

## 2022-12-14 NOTE — ED ADULT NURSE NOTE - HOW OFTEN DO YOU HAVE A DRINK CONTAINING ALCOHOL?
Never
Performing Laboratory: -[id number]
Expected Date Of Service: 09/09/2021
Billing Type: [employer]
Bill For Surgical Tray: no

## 2022-12-14 NOTE — CONSULT NOTE ADULT - SUBJECTIVE AND OBJECTIVE BOX
INCOMPLETE      HPI  52yFemale _-hand dominant c/o L shoulder pain s/p mechanical fall down 15 steps. Endorses headstrike, denies LOC. Denies numbness/tingling in the LUE. Denies any other trauma/injuries at this time.    ROS  Negative unless otherwise specified in HPI.    PAST MEDICAL & SURGICAL Hx  PAST MEDICAL & SURGICAL HISTORY:  Diabetes      Hyperlipidemia, unspecified hyperlipidemia type      Disc disorder  (herniated discs to back/?neck)      CVA (cerebral vascular accident)      CAD (coronary artery disease)  (Pt states she was told she had an MI in the past; denies hx/o stent or other interventional procedure)      Chronic back pain  (thoracic)      Mass of shoulder region  R shoulder 1998 for lipoma          MEDICATIONS  Home Medications:  aspirin 81 mg oral tablet: 1 tab(s) orally &quot;every other day&quot; (26 Dec 2017 21:19)  atorvastatin 40 mg oral tablet: 1 tab(s) orally once a day (at bedtime) (26 Dec 2017 21:19)  Tylenol:  (26 Dec 2017 21:19)      ALLERGIES  No Known Allergies      FAMILY Hx  FAMILY HISTORY:  Family history of breast cancer (Mother)    Family history of diabetes mellitus (DM) (Mother, Father)    Family history of hypertension (Father)      VITALS  Vital Signs Last 24 Hrs  T(C): 37.3 (14 Dec 2022 01:50), Max: 37.3 (14 Dec 2022 01:50)  T(F): 99.2 (14 Dec 2022 01:50), Max: 99.2 (14 Dec 2022 01:50)  HR: 80 (14 Dec 2022 01:50) (80 - 85)  BP: 119/77 (14 Dec 2022 01:50) (119/77 - 130/70)  RR: 16 (14 Dec 2022 01:50) (16 - 18)  SpO2: 99% (14 Dec 2022 01:50) (98% - 99%)  Parameters below as of 14 Dec 2022 01:42  Patient On (Oxygen Delivery Method): room air      PHYSICAL EXAM  Gen: Lying in bed, NAD  Resp: No increased WOB  LUE:  Skin intact/no open wounds, +swelling over shoulder  +TTP over shoulder and along LUE; compartments soft  Limited ROM at shoulder 2/2 pain, able to passively abduct 0-45 deg  Motor: Ax/Musc/Med/Rad/AIN/PIN/U intact  Sensory: Ax/Musc/Med/Rad/U SILT  +Rad pulse, WWP    Secondary survey:  TTP along L hip and along LLE    LABS                        13.2   7.57  )-----------( 274      ( 14 Dec 2022 01:40 )             38.9     12-14    135  |  98  |  13  ----------------------------<  216<H>  3.5   |  24  |  0.80    Ca    9.5      14 Dec 2022 01:40    TPro  7.4  /  Alb  4.3  /  TBili  <0.2  /  DBili  x   /  AST  18  /  ALT  21  /  AlkPhos  58  12-14      IMAGING  < from: CT Shoulder No Cont, Left (12.13.22 @ 23:35) >  ACC: [id number] EXAM:  CT SHOULDER ONLY LT                          PROCEDURE DATE:  12/13/2022          INTERPRETATION:  EXAMINATION: CT SHOULDER LEFT    CLINICAL INDICATION:Shoulder pain post fall down stairs, no fracture on   xray    COMPARISON: Radiographs dated 12/13/2022    TECHNIQUE: CT of the left shoulder was performed without intravenous   contrast.    INTERPRETATION:  : No additional findings.    Bones/joints: There is no evidence of fracture. There is normal alignment   at the glenohumeral joint. There are small foci of soft tissue gas   anteriorly at the glenohumeral joint, the etiology of which is uncertain.   If there is concern concern for infection, aspiration should be performed.    There are punctate tiny foci of density associated with the biceps tendon   sheath/bicipital groove (series 3 image 35). There is mild spondylosis of   the thoracic spine.    Soft tissues: There is no localized muscle atrophy. There is no soft   tissue collection identified within the limits of noncontrast CT.    Other: The partially visualized left lung is clear.    IMPRESSION:    1.  No evidence of acute fracture.    2.  Small foci of soft tissue gas anteriorly at the glenohumeral joint,   the etiology of which is uncertain. If there is concern concern for   infection, joint aspiration should be performed.    3.  Punctate tiny foci of density associated with the biceps tendon   sheath/bicipital groove, may represent loose bodies versus sequela of   prior injury.    RECOMMENDATION: Noncontrast MRI shoulder for further evaluation.    --- End of Report ---    < end of copied text >      ASSESSMENT & PLAN  52yFemale w/ tiny focus of L shoulder intra-articular air. No open wounds to suggest traumatic arthrotomy. Likely incidental finding.  -WBAT LUE  -f/u XRs of pelvis and entire LLE  -consider trauma surgery eval given WAGNER  -pain control  -ice/cold compress  -PT/OT  -no acute ortho surgery at this time  -f/u outpt with Dr. Nice in 1 week, call office for appt INCOMPLETE      HPI  52yFemale _-hand dominant c/o L shoulder pain s/p mechanical fall down 15 steps. Endorses headstrike, denies LOC. Denies numbness/tingling in the LUE. Denies any other trauma/injuries at this time.    ROS  Negative unless otherwise specified in HPI.    PAST MEDICAL & SURGICAL Hx  PAST MEDICAL & SURGICAL HISTORY:  Diabetes      Hyperlipidemia, unspecified hyperlipidemia type      Disc disorder  (herniated discs to back/?neck)      CVA (cerebral vascular accident)      CAD (coronary artery disease)  (Pt states she was told she had an MI in the past; denies hx/o stent or other interventional procedure)      Chronic back pain  (thoracic)      Mass of shoulder region  R shoulder 1998 for lipoma          MEDICATIONS  Home Medications:  aspirin 81 mg oral tablet: 1 tab(s) orally &quot;every other day&quot; (26 Dec 2017 21:19)  atorvastatin 40 mg oral tablet: 1 tab(s) orally once a day (at bedtime) (26 Dec 2017 21:19)  Tylenol:  (26 Dec 2017 21:19)      ALLERGIES  No Known Allergies      FAMILY Hx  FAMILY HISTORY:  Family history of breast cancer (Mother)    Family history of diabetes mellitus (DM) (Mother, Father)    Family history of hypertension (Father)      VITALS  Vital Signs Last 24 Hrs  T(C): 37.3 (14 Dec 2022 01:50), Max: 37.3 (14 Dec 2022 01:50)  T(F): 99.2 (14 Dec 2022 01:50), Max: 99.2 (14 Dec 2022 01:50)  HR: 80 (14 Dec 2022 01:50) (80 - 85)  BP: 119/77 (14 Dec 2022 01:50) (119/77 - 130/70)  RR: 16 (14 Dec 2022 01:50) (16 - 18)  SpO2: 99% (14 Dec 2022 01:50) (98% - 99%)  Parameters below as of 14 Dec 2022 01:42  Patient On (Oxygen Delivery Method): room air      PHYSICAL EXAM  Gen: Lying in bed, NAD  Resp: No increased WOB  LUE:  Skin intact/no open wounds, +swelling over shoulder  +TTP over shoulder and along LUE; compartments soft  Limited ROM at shoulder 2/2 pain, able to passively abduct 0-45 deg  Motor: Ax/Musc/Med/Rad/AIN/PIN/U intact  Sensory: Ax/Musc/Med/Rad/U SILT  +Rad pulse, WWP    Secondary survey:  TTP along L hip and along LLE    LABS                        13.2   7.57  )-----------( 274      ( 14 Dec 2022 01:40 )             38.9     12-14    135  |  98  |  13  ----------------------------<  216<H>  3.5   |  24  |  0.80    Ca    9.5      14 Dec 2022 01:40    TPro  7.4  /  Alb  4.3  /  TBili  <0.2  /  DBili  x   /  AST  18  /  ALT  21  /  AlkPhos  58  12-14      IMAGING  < from: CT Shoulder No Cont, Left (12.13.22 @ 23:35) >  ACC: [id number] EXAM:  CT SHOULDER ONLY LT                          PROCEDURE DATE:  12/13/2022          INTERPRETATION:  EXAMINATION: CT SHOULDER LEFT    CLINICAL INDICATION:Shoulder pain post fall down stairs, no fracture on   xray    COMPARISON: Radiographs dated 12/13/2022    TECHNIQUE: CT of the left shoulder was performed without intravenous   contrast.    INTERPRETATION:  : No additional findings.    Bones/joints: There is no evidence of fracture. There is normal alignment   at the glenohumeral joint. There are small foci of soft tissue gas   anteriorly at the glenohumeral joint, the etiology of which is uncertain.   If there is concern concern for infection, aspiration should be performed.    There are punctate tiny foci of density associated with the biceps tendon   sheath/bicipital groove (series 3 image 35). There is mild spondylosis of   the thoracic spine.    Soft tissues: There is no localized muscle atrophy. There is no soft   tissue collection identified within the limits of noncontrast CT.    Other: The partially visualized left lung is clear.    IMPRESSION:    1.  No evidence of acute fracture.    2.  Small foci of soft tissue gas anteriorly at the glenohumeral joint,   the etiology of which is uncertain. If there is concern concern for   infection, joint aspiration should be performed.    3.  Punctate tiny foci of density associated with the biceps tendon   sheath/bicipital groove, may represent loose bodies versus sequela of   prior injury.    RECOMMENDATION: Noncontrast MRI shoulder for further evaluation.    --- End of Report ---    < end of copied text >      ASSESSMENT & PLAN  52yFemale w/ tiny focus of L shoulder intra-articular air. No open wounds to suggest traumatic arthrotomy. Likely incidental finding.  -WBAT LUE, sling for comfort  -f/u XRs: CXR, AP pelvis, L hip, L femur, L knee, L tibfib, L ankle  -consider trauma surgery eval given WAGNER  -pain control  -ice/cold compress  -PT/OT  -no acute ortho surgery at this time  -f/u outpt with Dr. Nice in 1 week, call office for appt INCOMPLETE      HPI  52yFemale _-hand dominant c/o L shoulder pain s/p mechanical fall down 15 steps. Endorses headstrike, denies LOC. Denies numbness/tingling in the LUE. Denies any other trauma/injuries at this time.    ROS  Negative unless otherwise specified in HPI.    PAST MEDICAL & SURGICAL Hx  PAST MEDICAL & SURGICAL HISTORY:  Diabetes      Hyperlipidemia, unspecified hyperlipidemia type      Disc disorder  (herniated discs to back/?neck)      CVA (cerebral vascular accident)      CAD (coronary artery disease)  (Pt states she was told she had an MI in the past; denies hx/o stent or other interventional procedure)      Chronic back pain  (thoracic)      Mass of shoulder region  R shoulder 1998 for lipoma          MEDICATIONS  Home Medications:  aspirin 81 mg oral tablet: 1 tab(s) orally &quot;every other day&quot; (26 Dec 2017 21:19)  atorvastatin 40 mg oral tablet: 1 tab(s) orally once a day (at bedtime) (26 Dec 2017 21:19)  Tylenol:  (26 Dec 2017 21:19)      ALLERGIES  No Known Allergies      FAMILY Hx  FAMILY HISTORY:  Family history of breast cancer (Mother)    Family history of diabetes mellitus (DM) (Mother, Father)    Family history of hypertension (Father)      VITALS  Vital Signs Last 24 Hrs  T(C): 37.3 (14 Dec 2022 01:50), Max: 37.3 (14 Dec 2022 01:50)  T(F): 99.2 (14 Dec 2022 01:50), Max: 99.2 (14 Dec 2022 01:50)  HR: 80 (14 Dec 2022 01:50) (80 - 85)  BP: 119/77 (14 Dec 2022 01:50) (119/77 - 130/70)  RR: 16 (14 Dec 2022 01:50) (16 - 18)  SpO2: 99% (14 Dec 2022 01:50) (98% - 99%)  Parameters below as of 14 Dec 2022 01:42  Patient On (Oxygen Delivery Method): room air      PHYSICAL EXAM  Gen: Lying in bed, NAD  Resp: No increased WOB  LUE:  Skin intact/no open wounds, +swelling over shoulder  +TTP over shoulder and along LUE; compartments soft  Limited ROM at shoulder 2/2 pain, able to passively abduct 0-45 deg  Motor: Ax/Musc/Med/Rad/AIN/PIN/U intact  Sensory: Ax/Musc/Med/Rad/U SILT  +Rad pulse, WWP    Secondary survey:  TTP along L hip and along LLE    LABS                        13.2   7.57  )-----------( 274      ( 14 Dec 2022 01:40 )             38.9     12-14    135  |  98  |  13  ----------------------------<  216<H>  3.5   |  24  |  0.80    Ca    9.5      14 Dec 2022 01:40    TPro  7.4  /  Alb  4.3  /  TBili  <0.2  /  DBili  x   /  AST  18  /  ALT  21  /  AlkPhos  58  12-14      IMAGING  < from: CT Shoulder No Cont, Left (12.13.22 @ 23:35) >  ACC: [id number] EXAM:  CT SHOULDER ONLY LT                          PROCEDURE DATE:  12/13/2022          INTERPRETATION:  EXAMINATION: CT SHOULDER LEFT    CLINICAL INDICATION:Shoulder pain post fall down stairs, no fracture on   xray    COMPARISON: Radiographs dated 12/13/2022    TECHNIQUE: CT of the left shoulder was performed without intravenous   contrast.    INTERPRETATION:  : No additional findings.    Bones/joints: There is no evidence of fracture. There is normal alignment   at the glenohumeral joint. There are small foci of soft tissue gas   anteriorly at the glenohumeral joint, the etiology of which is uncertain.   If there is concern concern for infection, aspiration should be performed.    There are punctate tiny foci of density associated with the biceps tendon   sheath/bicipital groove (series 3 image 35). There is mild spondylosis of   the thoracic spine.    Soft tissues: There is no localized muscle atrophy. There is no soft   tissue collection identified within the limits of noncontrast CT.    Other: The partially visualized left lung is clear.    IMPRESSION:    1.  No evidence of acute fracture.    2.  Small foci of soft tissue gas anteriorly at the glenohumeral joint,   the etiology of which is uncertain. If there is concern concern for   infection, joint aspiration should be performed.    3.  Punctate tiny foci of density associated with the biceps tendon   sheath/bicipital groove, may represent loose bodies versus sequela of   prior injury.    RECOMMENDATION: Noncontrast MRI shoulder for further evaluation.    --- End of Report ---    < end of copied text >      ASSESSMENT & PLAN  52yFemale w/ tiny focus of L shoulder intra-articular air. No open wounds to suggest traumatic arthrotomy. Likely incidental finding.  -WBAT LUE, sling for comfort  -f/u XRs: CXR, AP pelvis, L hip, L femur, L knee, L tibfib, L ankle; call ortho back if fx found  -consider trauma surgery eval given WAGNER  -pain control  -ice/cold compress  -PT/OT  -no acute ortho surgery at this time  -f/u outpt with Dr. Nice in 1 week, call office for appt INCOMPLETE      HPI  52yFemale c/o L shoulder pain s/p mechanical fall down 15 steps. Endorses headstrike, denies LOC. Denies numbness/tingling in the LUE. Denies any other trauma/injuries at this time. Normally walks without any assistive devices.    ROS  Negative unless otherwise specified in HPI.    PAST MEDICAL & SURGICAL Hx  PAST MEDICAL & SURGICAL HISTORY:  Diabetes      Hyperlipidemia, unspecified hyperlipidemia type      Disc disorder  (herniated discs to back/?neck)      CVA (cerebral vascular accident)      CAD (coronary artery disease)  (Pt states she was told she had an MI in the past; denies hx/o stent or other interventional procedure)      Chronic back pain  (thoracic)      Mass of shoulder region  R shoulder 1998 for lipoma          MEDICATIONS  Home Medications:  aspirin 81 mg oral tablet: 1 tab(s) orally &quot;every other day&quot; (26 Dec 2017 21:19)  atorvastatin 40 mg oral tablet: 1 tab(s) orally once a day (at bedtime) (26 Dec 2017 21:19)  Tylenol:  (26 Dec 2017 21:19)      ALLERGIES  No Known Allergies      FAMILY Hx  FAMILY HISTORY:  Family history of breast cancer (Mother)    Family history of diabetes mellitus (DM) (Mother, Father)    Family history of hypertension (Father)      VITALS  Vital Signs Last 24 Hrs  T(C): 37.3 (14 Dec 2022 01:50), Max: 37.3 (14 Dec 2022 01:50)  T(F): 99.2 (14 Dec 2022 01:50), Max: 99.2 (14 Dec 2022 01:50)  HR: 80 (14 Dec 2022 01:50) (80 - 85)  BP: 119/77 (14 Dec 2022 01:50) (119/77 - 130/70)  RR: 16 (14 Dec 2022 01:50) (16 - 18)  SpO2: 99% (14 Dec 2022 01:50) (98% - 99%)  Parameters below as of 14 Dec 2022 01:42  Patient On (Oxygen Delivery Method): room air      PHYSICAL EXAM  Gen: Lying in bed, NAD  Resp: No increased WOB  LUE:  Skin intact/no open wounds, +swelling over shoulder  +TTP over shoulder and along LUE; compartments soft  Limited ROM at shoulder 2/2 pain, able to passively abduct 0-45 deg  Motor: Ax/Musc/Med/Rad/AIN/PIN/U intact  Sensory: Ax/Musc/Med/Rad/U SILT  +Rad pulse, WWP    Secondary survey:  TTP along L hip and along LLE    LABS                        13.2   7.57  )-----------( 274      ( 14 Dec 2022 01:40 )             38.9     12-14    135  |  98  |  13  ----------------------------<  216<H>  3.5   |  24  |  0.80    Ca    9.5      14 Dec 2022 01:40    TPro  7.4  /  Alb  4.3  /  TBili  <0.2  /  DBili  x   /  AST  18  /  ALT  21  /  AlkPhos  58  12-14      IMAGING  < from: CT Shoulder No Cont, Left (12.13.22 @ 23:35) >  ACC: [id number] EXAM:  CT SHOULDER ONLY LT                          PROCEDURE DATE:  12/13/2022          INTERPRETATION:  EXAMINATION: CT SHOULDER LEFT    CLINICAL INDICATION:Shoulder pain post fall down stairs, no fracture on   xray    COMPARISON: Radiographs dated 12/13/2022    TECHNIQUE: CT of the left shoulder was performed without intravenous   contrast.    INTERPRETATION:  : No additional findings.    Bones/joints: There is no evidence of fracture. There is normal alignment   at the glenohumeral joint. There are small foci of soft tissue gas   anteriorly at the glenohumeral joint, the etiology of which is uncertain.   If there is concern concern for infection, aspiration should be performed.    There are punctate tiny foci of density associated with the biceps tendon   sheath/bicipital groove (series 3 image 35). There is mild spondylosis of   the thoracic spine.    Soft tissues: There is no localized muscle atrophy. There is no soft   tissue collection identified within the limits of noncontrast CT.    Other: The partially visualized left lung is clear.    IMPRESSION:    1.  No evidence of acute fracture.    2.  Small foci of soft tissue gas anteriorly at the glenohumeral joint,   the etiology of which is uncertain. If there is concern concern for   infection, joint aspiration should be performed.    3.  Punctate tiny foci of density associated with the biceps tendon   sheath/bicipital groove, may represent loose bodies versus sequela of   prior injury.    RECOMMENDATION: Noncontrast MRI shoulder for further evaluation.    --- End of Report ---    < end of copied text >      ASSESSMENT & PLAN  52yFemale w/ tiny focus of L shoulder intra-articular air. No open wounds to suggest traumatic arthrotomy. Likely incidental finding.  -WBAT LUE, sling for comfort  -f/u XRs: CXR, AP pelvis, L hip, L femur, L knee, L tibfib, L ankle; call ortho back if fx found  -consider trauma surgery eval given WAGNER  -pain control  -ice/cold compress  -PT/OT  -no acute ortho surgery at this time  -f/u outpt with Dr. Nice in 1 week, call office for appt INCOMPLETE      HPI  52yFemale c/o L shoulder pain s/p mechanical fall down 15 steps. Endorses headstrike, denies LOC. Denies numbness/tingling in the LUE. Denies any other trauma/injuries at this time. Normally walks without any assistive devices.    ROS  Negative unless otherwise specified in HPI.    PAST MEDICAL & SURGICAL Hx  PAST MEDICAL & SURGICAL HISTORY:  Diabetes      Hyperlipidemia, unspecified hyperlipidemia type      Disc disorder  (herniated discs to back/?neck)      CVA (cerebral vascular accident)      CAD (coronary artery disease)  (Pt states she was told she had an MI in the past; denies hx/o stent or other interventional procedure)      Chronic back pain  (thoracic)      Mass of shoulder region  R shoulder 1998 for lipoma          MEDICATIONS  Home Medications:  aspirin 81 mg oral tablet: 1 tab(s) orally &quot;every other day&quot; (26 Dec 2017 21:19)  atorvastatin 40 mg oral tablet: 1 tab(s) orally once a day (at bedtime) (26 Dec 2017 21:19)  Tylenol:  (26 Dec 2017 21:19)      ALLERGIES  No Known Allergies      FAMILY Hx  FAMILY HISTORY:  Family history of breast cancer (Mother)    Family history of diabetes mellitus (DM) (Mother, Father)    Family history of hypertension (Father)      VITALS  Vital Signs Last 24 Hrs  T(C): 37.3 (14 Dec 2022 01:50), Max: 37.3 (14 Dec 2022 01:50)  T(F): 99.2 (14 Dec 2022 01:50), Max: 99.2 (14 Dec 2022 01:50)  HR: 80 (14 Dec 2022 01:50) (80 - 85)  BP: 119/77 (14 Dec 2022 01:50) (119/77 - 130/70)  RR: 16 (14 Dec 2022 01:50) (16 - 18)  SpO2: 99% (14 Dec 2022 01:50) (98% - 99%)  Parameters below as of 14 Dec 2022 01:42  Patient On (Oxygen Delivery Method): room air      PHYSICAL EXAM  Gen: Lying in bed, NAD  Resp: No increased WOB  LUE:  Skin intact/no open wounds, +swelling over shoulder  +TTP over shoulder and along LUE; compartments soft  Limited ROM at shoulder 2/2 pain, able to passively abduct 0-45 deg  Motor: Ax/Musc/Med/Rad/AIN/PIN/U intact  Sensory: Ax/Musc/Med/Rad/U SILT  +Rad pulse, WWP    Secondary survey:  TTP along L hip and along LLE    LABS                        13.2   7.57  )-----------( 274      ( 14 Dec 2022 01:40 )             38.9     12-14    135  |  98  |  13  ----------------------------<  216<H>  3.5   |  24  |  0.80    Ca    9.5      14 Dec 2022 01:40    TPro  7.4  /  Alb  4.3  /  TBili  <0.2  /  DBili  x   /  AST  18  /  ALT  21  /  AlkPhos  58  12-14      IMAGING  < from: CT Shoulder No Cont, Left (12.13.22 @ 23:35) >  ACC: [id number] EXAM:  CT SHOULDER ONLY LT                          PROCEDURE DATE:  12/13/2022          INTERPRETATION:  EXAMINATION: CT SHOULDER LEFT    CLINICAL INDICATION:Shoulder pain post fall down stairs, no fracture on   xray    COMPARISON: Radiographs dated 12/13/2022    TECHNIQUE: CT of the left shoulder was performed without intravenous   contrast.    INTERPRETATION:  : No additional findings.    Bones/joints: There is no evidence of fracture. There is normal alignment   at the glenohumeral joint. There are small foci of soft tissue gas   anteriorly at the glenohumeral joint, the etiology of which is uncertain.   If there is concern concern for infection, aspiration should be performed.    There are punctate tiny foci of density associated with the biceps tendon   sheath/bicipital groove (series 3 image 35). There is mild spondylosis of   the thoracic spine.    Soft tissues: There is no localized muscle atrophy. There is no soft   tissue collection identified within the limits of noncontrast CT.    Other: The partially visualized left lung is clear.    IMPRESSION:    1.  No evidence of acute fracture.    2.  Small foci of soft tissue gas anteriorly at the glenohumeral joint,   the etiology of which is uncertain. If there is concern concern for   infection, joint aspiration should be performed.    3.  Punctate tiny foci of density associated with the biceps tendon   sheath/bicipital groove, may represent loose bodies versus sequela of   prior injury.    RECOMMENDATION: Noncontrast MRI shoulder for further evaluation.    --- End of Report ---    < end of copied text >      ASSESSMENT & PLAN  52yFemale w/ tiny focus of L shoulder intra-articular air. No open wounds to suggest traumatic arthrotomy. Likely incidental finding.  -WBAT LUE, sling for comfort  -f/u XRs: AP pelvis, L hip, L femur, L knee, L tibfib, L ankle; call ortho back if fx found  -consider trauma surgery eval given WAGNER  -pain control  -ice/cold compress  -PT/OT  -no acute ortho surgery at this time  -f/u outpt with Dr. Nice in 1 week, call office for appt INCOMPLETE      HPI  52yFemale c/o L shoulder pain s/p mechanical fall down 15 steps. Endorses headstrike, denies LOC. Denies numbness/tingling in the LUE. Denies any other trauma/injuries at this time. Normally walks without any assistive devices.    ROS  Negative unless otherwise specified in HPI.    PAST MEDICAL & SURGICAL Hx  PAST MEDICAL & SURGICAL HISTORY:  Diabetes      Hyperlipidemia, unspecified hyperlipidemia type      Disc disorder  (herniated discs to back/?neck)      CVA (cerebral vascular accident)      CAD (coronary artery disease)  (Pt states she was told she had an MI in the past; denies hx/o stent or other interventional procedure)      Chronic back pain  (thoracic)      Mass of shoulder region  R shoulder 1998 for lipoma          MEDICATIONS  Home Medications:  aspirin 81 mg oral tablet: 1 tab(s) orally &quot;every other day&quot; (26 Dec 2017 21:19)  atorvastatin 40 mg oral tablet: 1 tab(s) orally once a day (at bedtime) (26 Dec 2017 21:19)  Tylenol:  (26 Dec 2017 21:19)      ALLERGIES  No Known Allergies      FAMILY Hx  FAMILY HISTORY:  Family history of breast cancer (Mother)    Family history of diabetes mellitus (DM) (Mother, Father)    Family history of hypertension (Father)      VITALS  Vital Signs Last 24 Hrs  T(C): 37.3 (14 Dec 2022 01:50), Max: 37.3 (14 Dec 2022 01:50)  T(F): 99.2 (14 Dec 2022 01:50), Max: 99.2 (14 Dec 2022 01:50)  HR: 80 (14 Dec 2022 01:50) (80 - 85)  BP: 119/77 (14 Dec 2022 01:50) (119/77 - 130/70)  RR: 16 (14 Dec 2022 01:50) (16 - 18)  SpO2: 99% (14 Dec 2022 01:50) (98% - 99%)  Parameters below as of 14 Dec 2022 01:42  Patient On (Oxygen Delivery Method): room air      PHYSICAL EXAM  Gen: Lying in bed, NAD  Resp: No increased WOB  LUE:  Skin intact/no open wounds, +swelling over shoulder  +TTP over shoulder and along LUE; compartments soft  Limited ROM at shoulder 2/2 pain, able to passively abduct 0-45 deg  Motor: Ax/Musc/Med/Rad/AIN/PIN/U intact  Sensory: Ax/Musc/Med/Rad/U SILT  +Rad pulse, WWP    Secondary survey:  TTP along L hip and along LLE    LABS                        13.2   7.57  )-----------( 274      ( 14 Dec 2022 01:40 )             38.9     12-14    135  |  98  |  13  ----------------------------<  216<H>  3.5   |  24  |  0.80    Ca    9.5      14 Dec 2022 01:40    TPro  7.4  /  Alb  4.3  /  TBili  <0.2  /  DBili  x   /  AST  18  /  ALT  21  /  AlkPhos  58  12-14      IMAGING  < from: CT Shoulder No Cont, Left (12.13.22 @ 23:35) >  ACC: [id number] EXAM:  CT SHOULDER ONLY LT                          PROCEDURE DATE:  12/13/2022          INTERPRETATION:  EXAMINATION: CT SHOULDER LEFT    CLINICAL INDICATION:Shoulder pain post fall down stairs, no fracture on   xray    COMPARISON: Radiographs dated 12/13/2022    TECHNIQUE: CT of the left shoulder was performed without intravenous   contrast.    INTERPRETATION:  : No additional findings.    Bones/joints: There is no evidence of fracture. There is normal alignment   at the glenohumeral joint. There are small foci of soft tissue gas   anteriorly at the glenohumeral joint, the etiology of which is uncertain.   If there is concern concern for infection, aspiration should be performed.    There are punctate tiny foci of density associated with the biceps tendon   sheath/bicipital groove (series 3 image 35). There is mild spondylosis of   the thoracic spine.    Soft tissues: There is no localized muscle atrophy. There is no soft   tissue collection identified within the limits of noncontrast CT.    Other: The partially visualized left lung is clear.    IMPRESSION:    1.  No evidence of acute fracture.    2.  Small foci of soft tissue gas anteriorly at the glenohumeral joint,   the etiology of which is uncertain. If there is concern concern for   infection, joint aspiration should be performed.    3.  Punctate tiny foci of density associated with the biceps tendon   sheath/bicipital groove, may represent loose bodies versus sequela of   prior injury.    RECOMMENDATION: Noncontrast MRI shoulder for further evaluation.    --- End of Report ---    < end of copied text >      ASSESSMENT & PLAN  52yFemale w/ tiny focus of L shoulder intra-articular air. No open wounds to suggest traumatic arthrotomy. Likely incidental finding.  -WBAT LUE, sling for comfort  -f/u XRs: AP pelvis, L hip, L femur, L knee, L tibfib, L ankle; call ortho back if fx found  -pain control  -ice/cold compress  -PT/OT  -no acute ortho surgery at this time  -f/u outpt with Dr. Nice in 1 week, call office for appt INCOMPLETE      HPI  52yFemale c/o L shoulder pain s/p mechanical fall down 15 steps. Endorses headstrike, denies LOC. Denies numbness/tingling in the LUE. Denies any other trauma/injuries at this time. Normally walks without any assistive devices.    ROS  Negative unless otherwise specified in HPI.    PAST MEDICAL & SURGICAL Hx  PAST MEDICAL & SURGICAL HISTORY:  Diabetes      Hyperlipidemia, unspecified hyperlipidemia type      Disc disorder  (herniated discs to back/?neck)      CVA (cerebral vascular accident)      CAD (coronary artery disease)  (Pt states she was told she had an MI in the past; denies hx/o stent or other interventional procedure)      Chronic back pain  (thoracic)      Mass of shoulder region  R shoulder 1998 for lipoma          MEDICATIONS  Home Medications:  aspirin 81 mg oral tablet: 1 tab(s) orally &quot;every other day&quot; (26 Dec 2017 21:19)  atorvastatin 40 mg oral tablet: 1 tab(s) orally once a day (at bedtime) (26 Dec 2017 21:19)  Tylenol:  (26 Dec 2017 21:19)      ALLERGIES  No Known Allergies      FAMILY Hx  FAMILY HISTORY:  Family history of breast cancer (Mother)    Family history of diabetes mellitus (DM) (Mother, Father)    Family history of hypertension (Father)      VITALS  Vital Signs Last 24 Hrs  T(C): 37.3 (14 Dec 2022 01:50), Max: 37.3 (14 Dec 2022 01:50)  T(F): 99.2 (14 Dec 2022 01:50), Max: 99.2 (14 Dec 2022 01:50)  HR: 80 (14 Dec 2022 01:50) (80 - 85)  BP: 119/77 (14 Dec 2022 01:50) (119/77 - 130/70)  RR: 16 (14 Dec 2022 01:50) (16 - 18)  SpO2: 99% (14 Dec 2022 01:50) (98% - 99%)  Parameters below as of 14 Dec 2022 01:42  Patient On (Oxygen Delivery Method): room air      PHYSICAL EXAM  Gen: Lying in bed, NAD  Resp: No increased WOB  LUE:  Skin intact/no open wounds, +swelling over shoulder  +TTP over shoulder and along LUE; compartments soft  Limited ROM at shoulder 2/2 pain, able to passively abduct 0-45 deg  Motor: Ax/Musc/Med/Rad/AIN/PIN/U intact  Sensory: Ax/Musc/Med/Rad/U SILT  +Rad pulse, WWP    Secondary survey:  TTP along L hip and along LLE    LABS                        13.2   7.57  )-----------( 274      ( 14 Dec 2022 01:40 )             38.9     12-14    135  |  98  |  13  ----------------------------<  216<H>  3.5   |  24  |  0.80    Ca    9.5      14 Dec 2022 01:40    TPro  7.4  /  Alb  4.3  /  TBili  <0.2  /  DBili  x   /  AST  18  /  ALT  21  /  AlkPhos  58  12-14      IMAGING  < from: CT Shoulder No Cont, Left (12.13.22 @ 23:35) >  ACC: [id number] EXAM:  CT SHOULDER ONLY LT                          PROCEDURE DATE:  12/13/2022          INTERPRETATION:  EXAMINATION: CT SHOULDER LEFT    CLINICAL INDICATION:Shoulder pain post fall down stairs, no fracture on   xray    COMPARISON: Radiographs dated 12/13/2022    TECHNIQUE: CT of the left shoulder was performed without intravenous   contrast.    INTERPRETATION:  : No additional findings.    Bones/joints: There is no evidence of fracture. There is normal alignment   at the glenohumeral joint. There are small foci of soft tissue gas   anteriorly at the glenohumeral joint, the etiology of which is uncertain.   If there is concern concern for infection, aspiration should be performed.    There are punctate tiny foci of density associated with the biceps tendon   sheath/bicipital groove (series 3 image 35). There is mild spondylosis of   the thoracic spine.    Soft tissues: There is no localized muscle atrophy. There is no soft   tissue collection identified within the limits of noncontrast CT.    Other: The partially visualized left lung is clear.    IMPRESSION:    1.  No evidence of acute fracture.    2.  Small foci of soft tissue gas anteriorly at the glenohumeral joint,   the etiology of which is uncertain. If there is concern concern for   infection, joint aspiration should be performed.    3.  Punctate tiny foci of density associated with the biceps tendon   sheath/bicipital groove, may represent loose bodies versus sequela of   prior injury.    RECOMMENDATION: Noncontrast MRI shoulder for further evaluation.    --- End of Report ---    < end of copied text >      ASSESSMENT & PLAN  52yFemale w/ tiny focus of L shoulder intra-articular air. No open wounds to suggest traumatic arthrotomy. No evidence for intra-articular infectious etiology. Likely incidental finding.  -WBAT LUE, sling for comfort  -f/u XRs: AP pelvis, L hip, L femur, L knee, L tibfib, L ankle; call ortho back if fx found  -pain control  -ice/cold compress  -PT/OT  -no acute ortho surgery at this time  -f/u outpt with Dr. Nice in 1 week, call office for appt

## 2022-12-14 NOTE — ED ADULT NURSE NOTE - ALCOHOL PRE SCREEN (AUDIT - C)
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  Evaluate for evidence of epileptiform activity.    METHODOLOGY   Electroencephalographic (EEG) recording is with electrodes placed according to the International 10-20 placement system.  Thirty two (32) channels of digital signal (sampling rate of 512/sec) including T1 and T2 was simultaneously recorded from the scalp and may include  EKG, EMG, and/or eye monitors.  Recording band pass was 0.1 to 512 hz.  Digital video recording of the patient is simultaneously recorded with the EEG.  The patient is instructed report clinical symptoms which may occur during the recording session.  EEG and video recording is stored and archived in digital format.  Activation procedures which include photic stimulation, hyperventilation and instructing patients to perform simple task are done in selected patients.   The EEG is displayed on a monitor screen and can be reviewed using different montages.  Computer assisted analysis is employed to detect spike and electrographic seizure activity.   The entire record is submitted for computer analysis.  The entire recording is visually reviewed and the times identified by computer analysis as being spikes or seizures are reviewed again.  Compresses spectral analysis (CSA) is also performed on the activity recorded from each individual channel.  This is displayed as a power display of frequencies from 0 to 30 Hz over time.   The CSA is reviewed looking for asymmetries in power between homologous areas of the scalp and then compared with the original EEG recording.     [employer] software is also utilized in the  review of this study.  This software suite analyzes the EEG recording in multiple domains.  Coherence and rhythmicity is computed to identify EEG sections which may contain organized seizures.  Each channel undergoes analysis to detect presence of spike and sharp waves which have special and morphological characteristic of epileptic activity.  The routine EEG recording is converted from spacial into frequency domain.  This is then displayed comparing homologous areas to identify areas of significant asymmetry.  Algorithm to identify non-cortically generated artifact is used to separate eye movement, EMG and other artifact from the EEG.      RECORDING TIMES  Start on 09/16/2020 at 19:08 p.m.  Stop on 09/16/2020 at 09:22 a.m.  A total of 14 hr and 13 min of EEG recording is obtained.    EEG FINDINGS  Background activity:   This is an electrode cap study of poor quality because of significant amounts of obscuring lead artifact.  Within that limitation, there are no obvious epileptiform features or signs of status epilepticus.    There are no pushbutton activations.    Sleep:  Sleep artifact is not identified    Activation procedures:   Hyperventilation is not performed  Photic stimulation is not performed    Cardiac Monitor:   There are no EKG capabilities on electrode caps    Impression:   This is a technically limited electrode cap EEG recording session.  Within that constraint, there is no evidence of epileptiform features or status epilepticus.  There are no pushbutton activations.    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  This twitching activity does not need to be treated aggressively and it can last up to years in some patients.  There are no  discrete electroclinical seizures.  Early in the extended study, there is a pushbutton for an episode of shifting movements of the legs in bed with no EEG correlate.      Interictal findings:   runs of periodic sharp waves over the left posterior quadrant with a prominent field into the right posterior quadrant as well, at approximately 1 hz  Other notable abnormalities:   moderate encephalopathy    Tina Winslow MD PhD  Neurology-Epilepsy  [facility].  [facility]   Statement Selected

## 2022-12-14 NOTE — ED ADULT NURSE NOTE - OBJECTIVE STATEMENT
alert oriented no distress cooperative c/o left arm pain left arm sling intact pulses palpable also c/o left ear pain  labs drawn and sent

## 2022-12-15 ENCOUNTER — APPOINTMENT (OUTPATIENT)
Dept: ORTHOPEDIC SURGERY | Facility: CLINIC | Age: 52
End: 2022-12-15

## 2022-12-15 VITALS — BODY MASS INDEX: 29.88 KG/M2 | HEIGHT: 64 IN | WEIGHT: 175 LBS

## 2022-12-15 DIAGNOSIS — S46.012D STRAIN OF MUSCLE(S) AND TENDON(S) OF THE ROTATOR CUFF OF LEFT SHOULDER, SUBSEQUENT ENCOUNTER: ICD-10-CM

## 2022-12-15 DIAGNOSIS — M75.22 BICIPITAL TENDINITIS, LEFT SHOULDER: ICD-10-CM

## 2022-12-15 PROCEDURE — 99213 OFFICE O/P EST LOW 20 MIN: CPT

## 2022-12-15 NOTE — HISTORY OF PRESENT ILLNESS
[de-identified] : Pt is a 53 y/o female c/o entire left sided body pain.  She fell down a flight of stairs outside her home on 12/13/22.  She has pain in her entire right side.  She went to [facility] ED where multiple xrays were taken.  All were negative for fracture.  She states that she continues to have severe pain.  She cannot sleep.  The pain is the worse in the left shoulder.  She cannot lift her left arm overhead.

## 2022-12-15 NOTE — PHYSICAL EXAM
[de-identified] : Patient is WDWN, alert, and in no acute distress. Breathing is unlabored. She is grossly oriented to person, place, and time.\par \par Left Upper Extremity:\par No focal tenderness on exam.\par No deformities.\par Mild edema, without ecchymosis.\par She cannot actively flex the shoulder, due to pain and injury.\par Passive flexion with pain to 60° \par Strength is greatly diminished.\par Full elbow ROM.\par Full wrist ROM.\par Full digital motion into flexion and extension.\par Sensation is intact along the deltoid and to the digits distally. [de-identified] : EXAM: XR FOREARM 2 VIEWS LT\par EXAM: XR ELBOW COMP MIN 3V LT\par EXAM: XR SHOULDER COMP MIN 2V LT\par EXAM: XR HUMERUS MIN 2 VIEWS LT\par EXAM: XR WRIST COMP MIN 3 VIEWS LT\par PROCEDURE DATE: 12/13/2022\par IMPRESSION:\par No acute fracture or dislocation.\par Small rounded erosions in the lunate and triquetrum. These are nonspecific but can be seen with gout, ulnar impaction, or intraosseous cyst.\par Joint spaces are maintained with the exception of mild acromioclavicular arthrosis and mild spurring at the coronoid process of the ulna.\par No elbow effusion.\par \par FRANCE BELLA MD; Resident Radiologist\par This document has been electronically signed.\par WILY HADLEY MD; Attending Radiologist\par This document has been electronically signed. Dec 13 2022 7:59PM\par \par ------------------------------------------------------------------------------------------------------------------------------------------------------------------------------------ \par \par EXAM: CT SHOULDER ONLY LT\par PROCEDURE DATE: 12/13/2022\par IMPRESSION:\par 1. No evidence of acute fracture.\par \par 2. Small foci of soft tissue gas anteriorly at the glenohumeral joint, the etiology of which is uncertain. If there is concern concern for infection, joint aspiration should be performed.\par \par 3. Punctate tiny foci of density associated with the biceps tendon sheath/bicipital groove, may represent loose bodies versus sequela of prior injury.\par \par SHLOMIT GOLDBERG-STEIN MD; Attending Radiologist\par This document has been electronically signed. Dec 14 2022 12:53AM\par \par ------------------------------------------------------------------------------------------------------------------------------------------------------------------------------------ \par \par EXAM: XR PELVIS AP ONLY 1-2 VIEWS\par EXAM: XR HIP 1V RT\par EXAM: XR FEMUR 1 VIEW LT\par EXAM: XR ANKLE 2 VIEWS LT\par PROCEDURE DATE: 12/14/2022\par IMPRESSION:\par No acute displaced fracture or dislocation.\par \par ROSAURA URIBE MD; Resident Radiologist\par This document has been electronically signed.\par VANITA CARTER MD; Attending Radiologist\par This document has been electronically signed. Dec 14 2022 5:20AM

## 2022-12-15 NOTE — DISCUSSION/SUMMARY
[de-identified] : The underlying pathophysiology was reviewed with the patient. XR and CT films were reviewed with the patient. Discussed at length the nature of the patient’s condition. \par \par With regard to the left shoulder, I reassured her that there are no fractures seen on both the xrays and CT obtained in the [facility] ED on 12/13/22. With that being said, based off her physical exam and level of pain, I am suspicious for a rotator cuff tear and have recommended an MRI for further evaluation. I told her that dependent upon what the MRI shows, she may require surgery to repair her rotator cuff. I did tell her that is not a procedure that I perform and would then refer her to either Dr. Patrick or Dr. Esposito for further care.\par In the interim, I recommended use of oral and topical antiinflammatories. She inquired about a refill of Oxycodone which I told her I will not prescribe due to the side effects and addictive nature. She deferred a prescription for Ibuprofen. At her request, she was fitted with a sling in the office for external support.\par \par An MRI of the left shoulder was ordered to evaluate for rotator cuff tear. Patient will follow-up to review the results and discuss further treatment options.\par \par All questions answered, understanding verbalized. Patient in agreement with plan of care. Follow up after MRI results.

## 2022-12-15 NOTE — ADDENDUM
[FreeTextEntry1] : I, Stephie Saucedo wrote this note acting as a scribe for Dr. Teodoro Junior on Dec 15, 2022.

## 2022-12-15 NOTE — END OF VISIT
[FreeTextEntry3] : All medical record entries made by the Scribe were at my,  Dr. Teodoro Junior MD., direction and personally dictated by me on 12/15/2022. I have personally reviewed the chart and agree that the record accurately reflects my personal performance of the history, physical exam, assessment and plan.

## 2022-12-17 ENCOUNTER — APPOINTMENT (OUTPATIENT)
Dept: MRI IMAGING | Facility: IMAGING CENTER | Age: 52
End: 2022-12-17

## 2022-12-26 ENCOUNTER — APPOINTMENT (OUTPATIENT)
Dept: MRI IMAGING | Facility: IMAGING CENTER | Age: 52
End: 2022-12-26
Payer: COMMERCIAL

## 2022-12-26 ENCOUNTER — OUTPATIENT (OUTPATIENT)
Dept: OUTPATIENT SERVICES | Facility: HOSPITAL | Age: 52
LOS: 1 days | End: 2022-12-26
Payer: COMMERCIAL

## 2022-12-26 DIAGNOSIS — R22.30 LOCALIZED SWELLING, MASS AND LUMP, UNSPECIFIED UPPER LIMB: Chronic | ICD-10-CM

## 2022-12-26 DIAGNOSIS — M75.22 BICIPITAL TENDINITIS, LEFT SHOULDER: ICD-10-CM

## 2022-12-26 PROCEDURE — 73221 MRI JOINT UPR EXTREM W/O DYE: CPT | Mod: 26,LT

## 2022-12-26 PROCEDURE — 73221 MRI JOINT UPR EXTREM W/O DYE: CPT

## 2023-01-12 ENCOUNTER — APPOINTMENT (OUTPATIENT)
Dept: ORTHOPEDIC SURGERY | Facility: CLINIC | Age: 53
End: 2023-01-12
Payer: COMMERCIAL

## 2023-01-12 PROCEDURE — 99213 OFFICE O/P EST LOW 20 MIN: CPT | Mod: 25

## 2023-01-12 PROCEDURE — 20610 DRAIN/INJ JOINT/BURSA W/O US: CPT

## 2023-01-12 NOTE — HISTORY OF PRESENT ILLNESS
[de-identified] : Pt is a 51 y/o female c/o entire left sided body pain.  She fell down a flight of stairs outside her home on 12/13/22.  She has pain in her entire right side.  She went to [facility] ED where multiple xrays were taken.  All were negative for fracture.  She states that she continues to have severe pain.  She cannot sleep.  The pain is the worse in the left shoulder.  She cannot lift her left arm overhead. She was initially seen in the office on 12/15/22 at which time I referred her for an MRI of her left shoulder given her physical exam and pain. She returns on 1/12/23 for MRI review. She reports her pain to have increased since her last visit. She states it feels as if something is caught inside of her shoulder. Her motion overall has not improved since her initial visit.\par \par She is a type II diabetic. She is unsure of her most recent A1c as well as blood sugar.

## 2023-01-12 NOTE — DISCUSSION/SUMMARY
[de-identified] : The underlying pathophysiology was reviewed with the patient. MRI films were reviewed with the patient. Discussed at length the nature of the patient’s condition. \par \par With regard to the left shoulder, I discussed both operative and nonoperative treatment measures. I told her that if she is bothered enough, surgery is always an option. She deferred as she stated she would first like a cortisone injection as well as physical therapy. I also recommended oral and topical antiinflammatories for pain relief.\par \par The patient wishes to proceed with a cortisone injection today. Under sterile precautions, an injection of 5cc 1% lidocaine without epinephrine and 0.5cc Kenalog 40mg, 0.5cc Dexamethasone was administered into the LEFT posterior subacromial joint space. The patient tolerated the procedure well. Rest and apply ice. \par \par The patient wishes to proceed with physical therapy of the left shoulder (ROM). A script was given.\par \par RX: Adjustable Cane\par \par All questions answered, understanding verbalized. Patient in agreement with plan of care. Follow up in 4 to 6 weeks for reassessment.

## 2023-01-12 NOTE — END OF VISIT
[FreeTextEntry3] : All medical record entries made by the Scribe were at my,  Dr. Teodoro Junior MD., direction and personally dictated by me on 01/12/2023. I have personally reviewed the chart and agree that the record accurately reflects my personal performance of the history, physical exam, assessment and plan.

## 2023-01-12 NOTE — ADDENDUM
[FreeTextEntry1] : I, Stephie Saucedo wrote this note acting as a scribe for Dr. Teodoro Junior on Jan 12, 2023.

## 2023-01-12 NOTE — PHYSICAL EXAM
[de-identified] : Patient is WDWN, alert, and in no acute distress. Breathing is unlabored. She is grossly oriented to person, place, and time.\par \par Left Upper Extremity:\par No focal tenderness on exam.\par No deformities.\par Mild edema, without ecchymosis.\par She cannot actively flex the shoulder, due to pain and injury.\par Passive flexion with pain to 60° \par Strength is greatly diminished.\par Full elbow ROM.\par Full wrist ROM.\par Full digital motion into flexion and extension.\par Sensation is intact along the deltoid and to the digits distally. [de-identified] : EXAM: [id number] - MR SHOULDER LT\par PROCEDURE DATE:  12/26/2022\par IMPRESSION:\par 1.  Moderate supraspinatus tendinosis with superimposed near full-thickness bursal surface tear of the distal insertional fibers with propagation into the adjacent infraspinatus fibers.\par 2.  Moderate infraspinatus tendinosis with long segment interstitial delamination tear.\par 3.  Moderate subscapularis tendinosis with focal globular hypointense deposit distally at the bursal surface compatible with hydroxyapatite deposition.\par 4.  Mild intra-articular long head biceps tendinosis with focal interstitial tear and tenosynovitis.\par 5.  Mild subacromial/subdeltoid bursitis.\par 6.  Findings which may be seen with adhesive capsulitis.\par \par VANITA RAO MD; Attending Radiologist\par This document has been electronically signed. Dec 27 2022 10:07PM\par \par ------------------------------------------------------------------------------------------------------------------------------------------------------------------------------------ \par \par EXAM: XR FOREARM 2 VIEWS LT\par EXAM: XR ELBOW COMP MIN 3V LT\par EXAM: XR SHOULDER COMP MIN 2V LT\par EXAM: XR HUMERUS MIN 2 VIEWS LT\par EXAM: XR WRIST COMP MIN 3 VIEWS LT\par PROCEDURE DATE: 12/13/2022\par IMPRESSION:\par No acute fracture or dislocation.\par Small rounded erosions in the lunate and triquetrum. These are nonspecific but can be seen with gout, ulnar impaction, or intraosseous cyst.\par Joint spaces are maintained with the exception of mild acromioclavicular arthrosis and mild spurring at the coronoid process of the ulna.\par No elbow effusion.\par \par FRANCE BELLA MD; Resident Radiologist\par This document has been electronically signed.\par WILY HADLEY MD; Attending Radiologist\par This document has been electronically signed. Dec 13 2022 7:59PM\par \par ------------------------------------------------------------------------------------------------------------------------------------------------------------------------------------ \par \par EXAM: CT SHOULDER ONLY LT\par PROCEDURE DATE: 12/13/2022\par IMPRESSION:\par 1. No evidence of acute fracture.\par \par 2. Small foci of soft tissue gas anteriorly at the glenohumeral joint, the etiology of which is uncertain. If there is concern concern for infection, joint aspiration should be performed.\par \par 3. Punctate tiny foci of density associated with the biceps tendon sheath/bicipital groove, may represent loose bodies versus sequela of prior injury.\par \par SHLOMIT GOLDBERG-STEIN MD; Attending Radiologist\par This document has been electronically signed. Dec 14 2022 12:53AM\par \par ------------------------------------------------------------------------------------------------------------------------------------------------------------------------------------ \par \par EXAM: XR PELVIS AP ONLY 1-2 VIEWS\par EXAM: XR HIP 1V RT\par EXAM: XR FEMUR 1 VIEW LT\par EXAM: XR ANKLE 2 VIEWS LT\par PROCEDURE DATE: 12/14/2022\par IMPRESSION:\par No acute displaced fracture or dislocation.\par \par ROSAURA URIBE MD; Resident Radiologist\par This document has been electronically signed.\par VANITA CARTER MD; Attending Radiologist\par This document has been electronically signed. Dec 14 2022 5:20AM

## 2023-01-16 ENCOUNTER — EMERGENCY (EMERGENCY)
Facility: HOSPITAL | Age: 53
LOS: 1 days | Discharge: HOME | End: 2023-01-16
Attending: EMERGENCY MEDICINE | Admitting: EMERGENCY MEDICINE
Payer: COMMERCIAL

## 2023-01-16 VITALS
SYSTOLIC BLOOD PRESSURE: 116 MMHG | TEMPERATURE: 98 F | OXYGEN SATURATION: 100 % | RESPIRATION RATE: 18 BRPM | DIASTOLIC BLOOD PRESSURE: 80 MMHG | HEART RATE: 96 BPM

## 2023-01-16 DIAGNOSIS — R22.30 LOCALIZED SWELLING, MASS AND LUMP, UNSPECIFIED UPPER LIMB: Chronic | ICD-10-CM

## 2023-01-16 PROCEDURE — 99285 EMERGENCY DEPT VISIT HI MDM: CPT

## 2023-01-16 NOTE — ED ADULT TRIAGE NOTE - CHIEF COMPLAINT QUOTE
Pt c/o dizziness and vision changes X 3 days and high blood sugar(303). Endorses generalized weakness. Denies chest pain, sob, abd pain, n/v/d, fevers/chills. Strength equal bilaterally, no slurred speech or facial droop. Pmhx: HLD, DM2, CAD, CVA Pt c/o dizziness and vision changes X 3 days and high blood sugar(355). Endorses generalized weakness. Denies chest pain, sob, abd pain, n/v/d, fevers/chills. Strength equal bilaterally, no slurred speech or facial droop. Pmhx: HLD, DM2, CAD, CVA

## 2023-01-16 NOTE — ED ADULT NURSE NOTE - PRIMARY CARE PROVIDER
Plan: Will continue to take oral birth control daily to aid in controlling her acne. Sent the RX of onexton to [facility] and gave contact information Continue Regimen: Junel birth control once daily Detail Level: Zone Initiate Treatment: Onexton once daily to face, apply pea sized amount Unknown

## 2023-01-16 NOTE — ED ADULT NURSE NOTE - CHIEF COMPLAINT QUOTE
Pt c/o dizziness and vision changes X 3 days and high blood sugar(355). Endorses generalized weakness. Denies chest pain, sob, abd pain, n/v/d, fevers/chills. Strength equal bilaterally, no slurred speech or facial droop. Pmhx: HLD, DM2, CAD, CVA

## 2023-01-16 NOTE — ED ADULT NURSE NOTE - OBJECTIVE STATEMENT
Patient received in ED pot 26. A&Ox4 and amb. C/o weakness and dizziness since last Monday as per pt. Also endorses occasional episodes of vision changes, describes as "looks like it's snowing". Placed on bedside cardiac monitor - NSR. Denies CP, SOB, nausea, vomiting, HA, fever or chills. Resp even and unlabored. No acute distress noted. Pt appears laying comfortably in bed. Speaking in full and complete sentences. Bed in lowest position, call bell in reach, wheels locked, safety maintained. Awaiting MD orders.

## 2023-01-16 NOTE — ED ADULT NURSE NOTE - NSIMPLEMENTINTERV_GEN_ALL_ED
Implemented All Fall with Harm Risk Interventions:  [place] to call system. Call bell, personal items and telephone within reach. Instruct patient to call for assistance. Room bathroom lighting operational. Non-slip footwear when patient is off stretcher. Physically safe environment: no spills, clutter or unnecessary equipment. Stretcher in lowest position, wheels locked, appropriate side rails in place. Provide visual cue, wrist band, yellow gown, etc. Monitor gait and stability. Monitor for mental status changes and reorient to person, place, and time. Review medications for side effects contributing to fall risk. Reinforce activity limits and safety measures with patient and family. Provide visual clues: red socks.

## 2023-01-16 NOTE — ED ADULT NURSE NOTE - NSFALLRSKUNASSIST_ED_ALL_ED
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 Patient with worsening leg edema but denies any exertional complaints.\par \par Will perform echocardiogram to reassess LV function and structural heart disease.\par Patient to continue furosemide 40 mg po daily for now.\par Will request most recent labs from PMD's office to review renal/liver function. \par Will continue current medical management of CAD/ischemic cardiomyopathy pending echo results and review of labs from PMD's office.\par \par Blood pressure is adequately controlled. Will continue to monitor on current medical management.\par \par Will continue atorvastatin 20 mg po daily for hyperlipidemia management for now pending review of recent labs.
no

## 2023-01-17 VITALS
OXYGEN SATURATION: 100 % | DIASTOLIC BLOOD PRESSURE: 82 MMHG | TEMPERATURE: 98 F | HEART RATE: 94 BPM | SYSTOLIC BLOOD PRESSURE: 116 MMHG | RESPIRATION RATE: 17 BRPM

## 2023-01-17 LAB
ALBUMIN SERPL ELPH-MCNC: 4.8 G/DL — SIGNIFICANT CHANGE UP (ref 3.3–5)
ALP SERPL-CCNC: 64 U/L — SIGNIFICANT CHANGE UP (ref 40–120)
ALT FLD-CCNC: 24 U/L — SIGNIFICANT CHANGE UP (ref 4–33)
ANION GAP SERPL CALC-SCNC: 15 MMOL/L — HIGH (ref 7–14)
APPEARANCE UR: CLEAR — SIGNIFICANT CHANGE UP
AST SERPL-CCNC: 19 U/L — SIGNIFICANT CHANGE UP (ref 4–32)
B-OH-BUTYR SERPL-SCNC: <0 MMOL/L — SIGNIFICANT CHANGE UP (ref 0–0.4)
BACTERIA # UR AUTO: NEGATIVE — SIGNIFICANT CHANGE UP
BASE EXCESS BLDV CALC-SCNC: 0.8 MMOL/L — SIGNIFICANT CHANGE UP (ref -2–3)
BASE EXCESS BLDV CALC-SCNC: 4.4 MMOL/L — HIGH (ref -2–3)
BASOPHILS # BLD AUTO: 0.05 K/UL — SIGNIFICANT CHANGE UP (ref 0–0.2)
BASOPHILS NFR BLD AUTO: 0.4 % — SIGNIFICANT CHANGE UP (ref 0–2)
BILIRUB SERPL-MCNC: 0.2 MG/DL — SIGNIFICANT CHANGE UP (ref 0.2–1.2)
BILIRUB UR-MCNC: NEGATIVE — SIGNIFICANT CHANGE UP
BLOOD GAS VENOUS COMPREHENSIVE RESULT: SIGNIFICANT CHANGE UP
BLOOD GAS VENOUS COMPREHENSIVE RESULT: SIGNIFICANT CHANGE UP
BUN SERPL-MCNC: 15 MG/DL — SIGNIFICANT CHANGE UP (ref 7–23)
CALCIUM SERPL-MCNC: 9.9 MG/DL — SIGNIFICANT CHANGE UP (ref 8.4–10.5)
CHLORIDE BLDV-SCNC: 95 MMOL/L — LOW (ref 96–108)
CHLORIDE BLDV-SCNC: 99 MMOL/L — SIGNIFICANT CHANGE UP (ref 96–108)
CHLORIDE SERPL-SCNC: 94 MMOL/L — LOW (ref 98–107)
CO2 BLDV-SCNC: 27.3 MMOL/L — HIGH (ref 22–26)
CO2 BLDV-SCNC: 32.9 MMOL/L — HIGH (ref 22–26)
CO2 SERPL-SCNC: 25 MMOL/L — SIGNIFICANT CHANGE UP (ref 22–31)
COLOR SPEC: SIGNIFICANT CHANGE UP
CREAT SERPL-MCNC: 0.66 MG/DL — SIGNIFICANT CHANGE UP (ref 0.5–1.3)
DIFF PNL FLD: NEGATIVE — SIGNIFICANT CHANGE UP
EGFR: 105 ML/MIN/1.73M2 — SIGNIFICANT CHANGE UP
EOSINOPHIL # BLD AUTO: 0.08 K/UL — SIGNIFICANT CHANGE UP (ref 0–0.5)
EOSINOPHIL NFR BLD AUTO: 0.7 % — SIGNIFICANT CHANGE UP (ref 0–6)
EPI CELLS # UR: 2 /HPF — SIGNIFICANT CHANGE UP (ref 0–5)
GAS PNL BLDV: 128 MMOL/L — LOW (ref 136–145)
GAS PNL BLDV: 130 MMOL/L — LOW (ref 136–145)
GLUCOSE BLDV-MCNC: 265 MG/DL — HIGH (ref 70–99)
GLUCOSE BLDV-MCNC: 289 MG/DL — HIGH (ref 70–99)
GLUCOSE SERPL-MCNC: 269 MG/DL — HIGH (ref 70–99)
GLUCOSE UR QL: ABNORMAL
HCO3 BLDV-SCNC: 26 MMOL/L — SIGNIFICANT CHANGE UP (ref 22–29)
HCO3 BLDV-SCNC: 31 MMOL/L — HIGH (ref 22–29)
HCT VFR BLD CALC: 41.9 % — SIGNIFICANT CHANGE UP (ref 34.5–45)
HCT VFR BLDA CALC: 38 % — SIGNIFICANT CHANGE UP (ref 34.5–46.5)
HCT VFR BLDA CALC: 43 % — SIGNIFICANT CHANGE UP (ref 34.5–46.5)
HGB BLD CALC-MCNC: 12.8 G/DL — SIGNIFICANT CHANGE UP (ref 11.5–15.5)
HGB BLD CALC-MCNC: 14.3 G/DL — SIGNIFICANT CHANGE UP (ref 11.5–15.5)
HGB BLD-MCNC: 13.9 G/DL — SIGNIFICANT CHANGE UP (ref 11.5–15.5)
IANC: 6.97 K/UL — SIGNIFICANT CHANGE UP (ref 1.8–7.4)
IMM GRANULOCYTES NFR BLD AUTO: 0.4 % — SIGNIFICANT CHANGE UP (ref 0–0.9)
KETONES UR-MCNC: NEGATIVE — SIGNIFICANT CHANGE UP
LACTATE BLDV-MCNC: 1.8 MMOL/L — SIGNIFICANT CHANGE UP (ref 0.5–2)
LACTATE BLDV-MCNC: 2.2 MMOL/L — HIGH (ref 0.5–2)
LEUKOCYTE ESTERASE UR-ACNC: NEGATIVE — SIGNIFICANT CHANGE UP
LYMPHOCYTES # BLD AUTO: 3.97 K/UL — HIGH (ref 1–3.3)
LYMPHOCYTES # BLD AUTO: 33.2 % — SIGNIFICANT CHANGE UP (ref 13–44)
MCHC RBC-ENTMCNC: 30 PG — SIGNIFICANT CHANGE UP (ref 27–34)
MCHC RBC-ENTMCNC: 33.2 GM/DL — SIGNIFICANT CHANGE UP (ref 32–36)
MCV RBC AUTO: 90.3 FL — SIGNIFICANT CHANGE UP (ref 80–100)
MONOCYTES # BLD AUTO: 0.83 K/UL — SIGNIFICANT CHANGE UP (ref 0–0.9)
MONOCYTES NFR BLD AUTO: 6.9 % — SIGNIFICANT CHANGE UP (ref 2–14)
NEUTROPHILS # BLD AUTO: 6.97 K/UL — SIGNIFICANT CHANGE UP (ref 1.8–7.4)
NEUTROPHILS NFR BLD AUTO: 58.4 % — SIGNIFICANT CHANGE UP (ref 43–77)
NITRITE UR-MCNC: NEGATIVE — SIGNIFICANT CHANGE UP
NRBC # BLD: 0 /100 WBCS — SIGNIFICANT CHANGE UP (ref 0–0)
NRBC # FLD: 0 K/UL — SIGNIFICANT CHANGE UP (ref 0–0)
PCO2 BLDV: 43 MMHG — HIGH (ref 39–42)
PCO2 BLDV: 54 MMHG — HIGH (ref 39–42)
PH BLDV: 7.37 — SIGNIFICANT CHANGE UP (ref 7.32–7.43)
PH BLDV: 7.39 — SIGNIFICANT CHANGE UP (ref 7.32–7.43)
PH UR: 7 — SIGNIFICANT CHANGE UP (ref 5–8)
PLATELET # BLD AUTO: 329 K/UL — SIGNIFICANT CHANGE UP (ref 150–400)
PO2 BLDV: 23 MMHG — SIGNIFICANT CHANGE UP
PO2 BLDV: 52 MMHG — SIGNIFICANT CHANGE UP
POTASSIUM BLDV-SCNC: 5.4 MMOL/L — HIGH (ref 3.5–5.1)
POTASSIUM BLDV-SCNC: 6.8 MMOL/L — CRITICAL HIGH (ref 3.5–5.1)
POTASSIUM SERPL-MCNC: 3.8 MMOL/L — SIGNIFICANT CHANGE UP (ref 3.5–5.3)
POTASSIUM SERPL-SCNC: 3.8 MMOL/L — SIGNIFICANT CHANGE UP (ref 3.5–5.3)
PROT SERPL-MCNC: 8.1 G/DL — SIGNIFICANT CHANGE UP (ref 6–8.3)
PROT UR-MCNC: ABNORMAL
RBC # BLD: 4.64 M/UL — SIGNIFICANT CHANGE UP (ref 3.8–5.2)
RBC # FLD: 12.5 % — SIGNIFICANT CHANGE UP (ref 10.3–14.5)
RBC CASTS # UR COMP ASSIST: 2 /HPF — SIGNIFICANT CHANGE UP (ref 0–4)
SAO2 % BLDV: 32.1 % — SIGNIFICANT CHANGE UP
SAO2 % BLDV: 80 % — SIGNIFICANT CHANGE UP
SODIUM SERPL-SCNC: 134 MMOL/L — LOW (ref 135–145)
SP GR SPEC: 1.03 — SIGNIFICANT CHANGE UP (ref 1.01–1.05)
TROPONIN T, HIGH SENSITIVITY RESULT: <6 NG/L — SIGNIFICANT CHANGE UP
UROBILINOGEN FLD QL: SIGNIFICANT CHANGE UP
WBC # BLD: 11.95 K/UL — HIGH (ref 3.8–10.5)
WBC # FLD AUTO: 11.95 K/UL — HIGH (ref 3.8–10.5)
WBC UR QL: 2 /HPF — SIGNIFICANT CHANGE UP (ref 0–5)

## 2023-01-17 RX ORDER — MECLIZINE HCL 12.5 MG
25 TABLET ORAL ONCE
Refills: 0 | Status: COMPLETED | OUTPATIENT
Start: 2023-01-17 | End: 2023-01-17

## 2023-01-17 RX ORDER — SODIUM CHLORIDE 9 MG/ML
1000 INJECTION INTRAMUSCULAR; INTRAVENOUS; SUBCUTANEOUS ONCE
Refills: 0 | Status: COMPLETED | OUTPATIENT
Start: 2023-01-17 | End: 2023-01-17

## 2023-01-17 RX ADMIN — SODIUM CHLORIDE 1000 MILLILITER(S): 9 INJECTION INTRAMUSCULAR; INTRAVENOUS; SUBCUTANEOUS at 00:48

## 2023-01-17 RX ADMIN — Medication 25 MILLIGRAM(S): at 00:48

## 2023-01-17 NOTE — ED PROVIDER NOTE - PHYSICAL EXAMINATION
Vital signs reviewed.   CONSTITUTIONAL: Well-appearing; well-nourished; in no apparent distress. Non-toxic appearing.   HEAD: Normocephalic, atraumatic.  EYES: PERRL, EOM intact, conjunctiva and sclera WNL. Mild Rt-forte horizontal nystagmus.   ENT: normal nose; no rhinorrhea; normal pharynx   NECK/LYMPH: Supple; non-tender;   CARD: Normal S1, S2; no murmurs  RESP: Normal chest excursion with respiration; breath sounds clear and equal bilaterally; no wheezes, rhonchi, or rales.  ABD/GI: soft, non-distended; non-tender;  EXT/MS: moves all extremities;   SKIN: Normal for age and race; warm; dry; good turgor; no apparent lesions or exudate noted.  NEURO: Awake, alert, oriented x 3, no gross deficits, CN II-XII grossly intact, no motor or sensory deficit noted. No drift. No facial droop. No slurred speech. 5/5 strength UE/LE b/l.   PSYCH: Normal mood; appropriate affect.

## 2023-01-17 NOTE — ED PROVIDER NOTE - OBJECTIVE STATEMENT
Patient is a 52 y.o female with PMHx of DM, chronic back pain  who presents to ED c/o dizziness x 4 days. Pt states on friday she began to feel dizzy, described as intermittent room spinning. Admits to hx of vertigo in past, states sx feel similar except today she felt generalized weakness as well. Has been having elevated FS at home in 300's. Pt not on insulin, takes januvia and glipizide. Notes some epigastric pain but states it is a/w eating and worse at night when she is lying down. Pt takes pepcid daily for sx. Pt denies HA, fevers, chills, cp/sob, URI sx, n/v/d, dysuria, hematuria, polydipsia, polyuria, abd pain, or any other complaints.

## 2023-01-17 NOTE — ED PROVIDER NOTE - NSFOLLOWUPINSTRUCTIONS_ED_ALL_ED_FT
Vertigo      Vertigo is the feeling that you or your surroundings are moving when they are not. This feeling can come and go at any time. Vertigo often goes away on its own. Vertigo can be dangerous if it occurs while you are doing something that could endanger yourself or others, such as driving or operating machinery.    Your health care provider will do tests to try to determine the cause of your vertigo. Tests will also help your health care provider decide how best to treat your condition.      Follow these instructions at home:      Eating and drinking       A comparison of three sample cups showing dark yellow, yellow, and pale yellow urine.       A sign showing that a person should not drink beer, wine, or liquor.     •Dehydration can make vertigo worse. Drink enough fluid to keep your urine pale yellow.      • Do not drink alcohol.      Activity     •Return to your normal activities as told by your health care provider. Ask your health care provider what activities are safe for you.      •In the morning, first sit up on the side of the bed. When you feel okay, stand slowly while you hold onto something until you know that your balance is fine.      •Move slowly. Avoid sudden body or head movements or certain positions, as told by your health care provider.      •If you have trouble walking or keeping your balance, try using a cane for stability. If you feel dizzy or unstable, sit down right away.      •Avoid doing any tasks that would cause danger to you or others if vertigo occurs.      •Avoid bending down if you feel dizzy. Place items in your home so that they are easy for you to reach without bending or leaning over.      • Do not drive or use machinery if you feel dizzy.      General instructions     •Take over-the-counter and prescription medicines only as told by your health care provider.      •Keep all follow-up visits. This is important.        Contact a health care provider if:    •Your medicines do not relieve your vertigo or they make it worse.      •Your condition gets worse or you develop new symptoms.      •You have a fever.      •You develop nausea or vomiting, or if nausea gets worse.      •Your family or friends notice any behavioral changes.      •You have numbness or a prickling and tingling sensation in part of your body.        Get help right away if you:    •Are always dizzy or you faint.      •Develop severe headaches.      •Develop a stiff neck.      •Develop sensitivity to light.      •Have difficulty moving or speaking.      •Have weakness in your hands, arms, or legs.      •Have changes in your hearing or vision.      These symptoms may represent a serious problem that is an emergency. Do not wait to see if the symptoms will go away. Get medical help right away. Call your local emergency services (911 in the [place] Do not drive yourself to the hospital.       Summary    •Vertigo is the feeling that you or your surroundings are moving when they are not.      •Your health care provider will do tests to try to determine the cause of your vertigo.      •Follow instructions for home care. You may be told to avoid certain tasks, positions, or movements.      •Contact a health care provider if your medicines do not relieve your symptoms, or if you have a fever, nausea, vomiting, or changes in behavior.      •Get help right away if you have severe headaches or difficulty speaking, or you develop hearing or vision problems.

## 2023-01-17 NOTE — ED ADULT NURSE REASSESSMENT NOTE - NS ED NURSE REASSESS COMMENT FT1
A&OX4 and amb. Resp even and unlabored. No acute distress noted. V/S charted. Safety maintained. Awaiting D/C paperwork.

## 2023-01-17 NOTE — ED PROVIDER NOTE - PATIENT PORTAL LINK FT
You can access the FollowMyHealth Patient Portal offered by [facility] by registering at the following website: [email or web address]. By joining [facility]’s FollowMyHealth portal, you will also be able to view your health information using other applications (apps) compatible with our system.

## 2023-01-17 NOTE — ED PROVIDER NOTE - NS ED ATTENDING STATEMENT MOD
This was a shared visit with the GABI. I reviewed and verified the documentation and independently performed the documented:

## 2023-01-17 NOTE — ED PROVIDER NOTE - ATTENDING APP SHARED VISIT CONTRIBUTION OF CARE
52-year-old female with a past medical history of diabetes and chronic back pain presenting with 4 days of intermittent vertigo.  States that on Friday started to feel room spinning sensations.  Similar episodes in the past with a history of vertigo.  Different today was at the patient felt a little bit weak overall.  States that her fingers been elevated at home despite taking her Januvia and glipizide.  Denies any other complaints at the current time.    Vitals: I have reviewed the patients vital signs  General: nontoxic appearing  HEENT: Atraumatic, normocephalic, airway patent  Eyes: EOMI, tracking appropriately  Neck: no tracheal deviation  Chest/Lungs: no trauma, symmetric chest rise, speaking in complete sentences,  no resp distress  Heart: skin and extremities well perfused, regular rate and rhythm  Neuro: A+Ox3, appears non focal  MSK: no deformities  Skin: no cyanosis, no jaundice   Psych:  Normal mood and affect    52-year-old female with a past medical history of diabetes and chronic back pain with 4 days of intermittent vertigo.  Symptoms are most constant with that of a peripheral vertigo.  There are no red flags for central vertigo.  Minimal symptoms at this time but we will treat symptomatically with meclizine.  We will do some screening labs as well to evaluate for other etiologies for weakness and dizziness.  Laboratory work-up was unremarkable.  Patient felt better after taking meclizine.  Will discharge home at this time.

## 2023-01-17 NOTE — ED PROVIDER NOTE - CLINICAL SUMMARY MEDICAL DECISION MAKING FREE TEXT BOX
Patient is a 52 y.o female with PMHx of DM, chronic back pain  who presents to ED c/o dizziness x 4 days. DDx- vertigo vs dehydration vs hyperglycemia. Plan for labs, UA/UC, IVF, meclizine, ecg, will monitor and reassess. Neuro exam normal. No indication for imaging at this time.

## 2023-01-18 LAB
CULTURE RESULTS: SIGNIFICANT CHANGE UP
SPECIMEN SOURCE: SIGNIFICANT CHANGE UP

## 2023-02-23 ENCOUNTER — APPOINTMENT (OUTPATIENT)
Dept: ORTHOPEDIC SURGERY | Facility: CLINIC | Age: 53
End: 2023-02-23
Payer: COMMERCIAL

## 2023-02-23 VITALS — HEIGHT: 64 IN | WEIGHT: 170 LBS | BODY MASS INDEX: 29.02 KG/M2

## 2023-02-23 DIAGNOSIS — M75.82 OTHER SHOULDER LESIONS, LEFT SHOULDER: ICD-10-CM

## 2023-02-23 DIAGNOSIS — M25.511 PAIN IN RIGHT SHOULDER: ICD-10-CM

## 2023-02-23 PROCEDURE — 99213 OFFICE O/P EST LOW 20 MIN: CPT

## 2023-02-23 NOTE — ADDENDUM
[FreeTextEntry1] : I, Stephie Saucedo wrote this note acting as a scribe for Dr. Teodoro Junior on Feb 23, 2023.

## 2023-02-23 NOTE — END OF VISIT
[FreeTextEntry3] : All medical record entries made by the Scribe were at my,  Dr. Teodoro Junior MD., direction and personally dictated by me on 02/23/2023. I have personally reviewed the chart and agree that the record accurately reflects my personal performance of the history, physical exam, assessment and plan.

## 2023-02-23 NOTE — DISCUSSION/SUMMARY
[de-identified] : The underlying pathophysiology was reviewed with the patient. MRI films were reviewed with the patient. Discussed at length the nature of the patient’s condition. \par \par With regard to the left shoulder, she was again advised to begin a course of physical therapy for ROM and strengthening. She was provided with the appropriate referral. Additionally at her request, she was provided with a referral to PT for her left lower extremity and lumbar spine.\par \par All questions answered, understanding verbalized. Patient in agreement with plan of care. Follow up as needed.

## 2023-02-23 NOTE — PHYSICAL EXAM
[de-identified] : Patient is WDWN, alert, and in no acute distress. Breathing is unlabored. She is grossly oriented to person, place, and time.\par \par Left Upper Extremity:\par No focal tenderness on exam.\par No deformities.\par Mild edema, without ecchymosis.\par She cannot actively flex the shoulder, due to pain and injury.\par Passive flexion with pain to 60° \par Strength is greatly diminished.\par Full elbow ROM.\par Full wrist ROM.\par Full digital motion into flexion and extension.\par Sensation is intact along the deltoid and to the digits distally. [de-identified] : EXAM: [id number] - MR SHOULDER LT\par PROCEDURE DATE:  12/26/2022\par IMPRESSION:\par 1.  Moderate supraspinatus tendinosis with superimposed near full-thickness bursal surface tear of the distal insertional fibers with propagation into the adjacent infraspinatus fibers.\par 2.  Moderate infraspinatus tendinosis with long segment interstitial delamination tear.\par 3.  Moderate subscapularis tendinosis with focal globular hypointense deposit distally at the bursal surface compatible with hydroxyapatite deposition.\par 4.  Mild intra-articular long head biceps tendinosis with focal interstitial tear and tenosynovitis.\par 5.  Mild subacromial/subdeltoid bursitis.\par 6.  Findings which may be seen with adhesive capsulitis.\par \par VANITA RAO MD; Attending Radiologist\par This document has been electronically signed. Dec 27 2022 10:07PM\par \par ------------------------------------------------------------------------------------------------------------------------------------------------------------------------------------ \par \par EXAM: XR FOREARM 2 VIEWS LT\par EXAM: XR ELBOW COMP MIN 3V LT\par EXAM: XR SHOULDER COMP MIN 2V LT\par EXAM: XR HUMERUS MIN 2 VIEWS LT\par EXAM: XR WRIST COMP MIN 3 VIEWS LT\par PROCEDURE DATE: 12/13/2022\par IMPRESSION:\par No acute fracture or dislocation.\par Small rounded erosions in the lunate and triquetrum. These are nonspecific but can be seen with gout, ulnar impaction, or intraosseous cyst.\par Joint spaces are maintained with the exception of mild acromioclavicular arthrosis and mild spurring at the coronoid process of the ulna.\par No elbow effusion.\par \par FRANCE BELLA MD; Resident Radiologist\par This document has been electronically signed.\par WILY HADLEY MD; Attending Radiologist\par This document has been electronically signed. Dec 13 2022 7:59PM\par \par ------------------------------------------------------------------------------------------------------------------------------------------------------------------------------------ \par \par EXAM: CT SHOULDER ONLY LT\par PROCEDURE DATE: 12/13/2022\par IMPRESSION:\par 1. No evidence of acute fracture.\par \par 2. Small foci of soft tissue gas anteriorly at the glenohumeral joint, the etiology of which is uncertain. If there is concern concern for infection, joint aspiration should be performed.\par \par 3. Punctate tiny foci of density associated with the biceps tendon sheath/bicipital groove, may represent loose bodies versus sequela of prior injury.\par \par SHLOMIT GOLDBERG-STEIN MD; Attending Radiologist\par This document has been electronically signed. Dec 14 2022 12:53AM\par \par ------------------------------------------------------------------------------------------------------------------------------------------------------------------------------------ \par \par EXAM: XR PELVIS AP ONLY 1-2 VIEWS\par EXAM: XR HIP 1V RT\par EXAM: XR FEMUR 1 VIEW LT\par EXAM: XR ANKLE 2 VIEWS LT\par PROCEDURE DATE: 12/14/2022\par IMPRESSION:\par No acute displaced fracture or dislocation.\par \par ROSAURA URIBE MD; Resident Radiologist\par This document has been electronically signed.\par VANITA CARTER MD; Attending Radiologist\par This document has been electronically signed. Dec 14 2022 5:20AM

## 2023-02-23 NOTE — HISTORY OF PRESENT ILLNESS
[de-identified] : Pt is a 51 y/o female c/o entire left sided body pain.  She fell down a flight of stairs outside her home on 12/13/22.  She has pain in her entire right side.  She went to [facility] ED where multiple xrays were taken.  All were negative for fracture.  She states that she continues to have severe pain.  She cannot sleep.  The pain is the worse in the left shoulder.  She cannot lift her left arm overhead. She was initially seen in the office on 12/15/22 at which time I referred her for an MRI of her left shoulder given her physical exam and pain. She returns on 1/12/23 for MRI review. She stated it felt as if something is caught inside of her shoulder. She was given a cortisone injection at the left shoulder at the time of her last visit on 1/12/23. She reports some improvement from the cortisone injection. She did not go for PT as she states the facilities she was referred to are too far from her home.\par \par She is a type II diabetic. She is unsure of her most recent A1c as well as blood sugar.

## 2023-04-01 NOTE — PROCEDURE
[FreeTextEntry6] : Anterior Rhinoscopy insufficient to account for symptoms.\par \par After informed verbal consent is obtained, the fiberoptic nasal endoscope #9 is passed via the right nasal cavity.\par The following anatomic sites were directly examined in a sequential fashion:\par The scope was introduced in both  nasal passages between the middle and inferior turbinates to exam the inferior portion of the middle meatus and the fontanelle, as well as the maxillary ostia.  Next, the scope was passed medially and posteriorly to the middle turbinates to examine the sphenoethmoid recess and the superior turbinate region.\par  \par \par   There is [ 0 ]% obstruction of the nasopharynx with adenoid tissue.\par \par A deviated nasal septum was noted causing obstruction.\par The turbinates were congested-bilateral.\par \par Right Side:\par ·               Mucosa: wnl\par ·               Mucous: none\par ·               Polyp: none\par ·               Inferior Turbinate: sl congested\par ·               Middle Turbinate:sl congested\par ·               Superior Turbinate: wnl\par ·               Inferior Meatus:clear\par ·               Middle Meatus: clear\par ·               Super Meatus: clear\par ·               Sphenoethmoidal Recess: wnl\par \par \par \par Left Side:\par ·               Mucosa: wnl\par ·               Mucous:none\par ·               Polyp: none\par ·               Inferior Turbinate: sl congested\par ·               Middle Turbinate: sl congested\par ·               Superior Turbinate:wnl\par ·               Inferior Meatus: clear\par ·               Middle Meatus: clear\par ·               Super Meatus:clear\par ·               Sphenoethmoidal Recess: wnl\par \par  01-Apr-2023 20:03

## 2023-05-23 ENCOUNTER — EMERGENCY (EMERGENCY)
Facility: HOSPITAL | Age: 53
LOS: 1 days | Discharge: HOME | End: 2023-05-23
Attending: STUDENT IN AN ORGANIZED HEALTH CARE EDUCATION/TRAINING PROGRAM | Admitting: STUDENT IN AN ORGANIZED HEALTH CARE EDUCATION/TRAINING PROGRAM
Payer: COMMERCIAL

## 2023-05-23 VITALS
OXYGEN SATURATION: 99 % | RESPIRATION RATE: 16 BRPM | TEMPERATURE: 98 F | DIASTOLIC BLOOD PRESSURE: 84 MMHG | HEART RATE: 89 BPM | SYSTOLIC BLOOD PRESSURE: 126 MMHG

## 2023-05-23 DIAGNOSIS — R22.30 LOCALIZED SWELLING, MASS AND LUMP, UNSPECIFIED UPPER LIMB: Chronic | ICD-10-CM

## 2023-05-23 LAB
ALBUMIN SERPL ELPH-MCNC: 4.9 G/DL — SIGNIFICANT CHANGE UP (ref 3.3–5)
ALP SERPL-CCNC: 57 U/L — SIGNIFICANT CHANGE UP (ref 40–120)
ALT FLD-CCNC: 21 U/L — SIGNIFICANT CHANGE UP (ref 4–33)
ANION GAP SERPL CALC-SCNC: 13 MMOL/L — SIGNIFICANT CHANGE UP (ref 7–14)
AST SERPL-CCNC: 23 U/L — SIGNIFICANT CHANGE UP (ref 4–32)
BASOPHILS # BLD AUTO: 0.04 K/UL — SIGNIFICANT CHANGE UP (ref 0–0.2)
BASOPHILS NFR BLD AUTO: 0.5 % — SIGNIFICANT CHANGE UP (ref 0–2)
BILIRUB SERPL-MCNC: <0.2 MG/DL — SIGNIFICANT CHANGE UP (ref 0.2–1.2)
BUN SERPL-MCNC: 10 MG/DL — SIGNIFICANT CHANGE UP (ref 7–23)
CALCIUM SERPL-MCNC: 10.1 MG/DL — SIGNIFICANT CHANGE UP (ref 8.4–10.5)
CHLORIDE SERPL-SCNC: 99 MMOL/L — SIGNIFICANT CHANGE UP (ref 98–107)
CK SERPL-CCNC: 157 U/L — SIGNIFICANT CHANGE UP (ref 25–170)
CO2 SERPL-SCNC: 26 MMOL/L — SIGNIFICANT CHANGE UP (ref 22–31)
CREAT SERPL-MCNC: 0.62 MG/DL — SIGNIFICANT CHANGE UP (ref 0.5–1.3)
EGFR: 106 ML/MIN/1.73M2 — SIGNIFICANT CHANGE UP
EOSINOPHIL # BLD AUTO: 0.41 K/UL — SIGNIFICANT CHANGE UP (ref 0–0.5)
EOSINOPHIL NFR BLD AUTO: 5.6 % — SIGNIFICANT CHANGE UP (ref 0–6)
GLUCOSE SERPL-MCNC: 70 MG/DL — SIGNIFICANT CHANGE UP (ref 70–99)
HCT VFR BLD CALC: 42.5 % — SIGNIFICANT CHANGE UP (ref 34.5–45)
HGB BLD-MCNC: 13.6 G/DL — SIGNIFICANT CHANGE UP (ref 11.5–15.5)
IANC: 2.73 K/UL — SIGNIFICANT CHANGE UP (ref 1.8–7.4)
IMM GRANULOCYTES NFR BLD AUTO: 0.1 % — SIGNIFICANT CHANGE UP (ref 0–0.9)
LYMPHOCYTES # BLD AUTO: 3.61 K/UL — HIGH (ref 1–3.3)
LYMPHOCYTES # BLD AUTO: 49.3 % — HIGH (ref 13–44)
MCHC RBC-ENTMCNC: 29.4 PG — SIGNIFICANT CHANGE UP (ref 27–34)
MCHC RBC-ENTMCNC: 32 GM/DL — SIGNIFICANT CHANGE UP (ref 32–36)
MCV RBC AUTO: 91.8 FL — SIGNIFICANT CHANGE UP (ref 80–100)
MONOCYTES # BLD AUTO: 0.52 K/UL — SIGNIFICANT CHANGE UP (ref 0–0.9)
MONOCYTES NFR BLD AUTO: 7.1 % — SIGNIFICANT CHANGE UP (ref 2–14)
NEUTROPHILS # BLD AUTO: 2.73 K/UL — SIGNIFICANT CHANGE UP (ref 1.8–7.4)
NEUTROPHILS NFR BLD AUTO: 37.4 % — LOW (ref 43–77)
NRBC # BLD: 0 /100 WBCS — SIGNIFICANT CHANGE UP (ref 0–0)
NRBC # FLD: 0 K/UL — SIGNIFICANT CHANGE UP (ref 0–0)
PLATELET # BLD AUTO: 296 K/UL — SIGNIFICANT CHANGE UP (ref 150–400)
POTASSIUM SERPL-MCNC: 3.9 MMOL/L — SIGNIFICANT CHANGE UP (ref 3.5–5.3)
POTASSIUM SERPL-SCNC: 3.9 MMOL/L — SIGNIFICANT CHANGE UP (ref 3.5–5.3)
PROT SERPL-MCNC: 8 G/DL — SIGNIFICANT CHANGE UP (ref 6–8.3)
RBC # BLD: 4.63 M/UL — SIGNIFICANT CHANGE UP (ref 3.8–5.2)
RBC # FLD: 12.7 % — SIGNIFICANT CHANGE UP (ref 10.3–14.5)
SODIUM SERPL-SCNC: 138 MMOL/L — SIGNIFICANT CHANGE UP (ref 135–145)
WBC # BLD: 7.32 K/UL — SIGNIFICANT CHANGE UP (ref 3.8–10.5)
WBC # FLD AUTO: 7.32 K/UL — SIGNIFICANT CHANGE UP (ref 3.8–10.5)

## 2023-05-23 PROCEDURE — 99284 EMERGENCY DEPT VISIT MOD MDM: CPT

## 2023-05-23 RX ORDER — KETOROLAC TROMETHAMINE 30 MG/ML
15 SYRINGE (ML) INJECTION ONCE
Refills: 0 | Status: DISCONTINUED | OUTPATIENT
Start: 2023-05-23 | End: 2023-05-23

## 2023-05-23 RX ADMIN — Medication 25 MILLIGRAM(S): at 19:46

## 2023-05-23 RX ADMIN — Medication 15 MILLIGRAM(S): at 18:39

## 2023-05-23 NOTE — ED ADULT NURSE NOTE - HISTORY OF COVID-19 VACCINATION
On 10/6/2022, George MOLINA scribed these services personally performed by Dr. Cristine Wolfe MD.     Chief Concern:    Chief Complaint   Patient presents with   • Office Visit   • Follow-up     Patient presents today unaccompanied.    History of Present Illness  Abdi Tinsley Jr. is a very pleasant 79 year old male who presents to the Dermatology clinic, for evaluation of the above chief concern(s).     Patient is here for a follow up visit. Last office visit was 10/8/2021, at which time biopsied ones lesion on the right superior preauricular cheek:  Right superior preauricular cheek, shave biopsy:   -Squamous cell carcinoma in situ.  Patient elected to treat with Efudex BID 3/3/3.    He also treated the cheeks, temples and tops of his ears with Efudex BID x 2 weeks since the last visit.  Reports the areas healed well, but had some trouble healing after the Efudex treatment on the cheek.  Patient reports that he was having some pain after doing the treatment. He had accidentally cut his cheek while shaving, and it had bled a lot. Patient ended up going to the ER and he continued to bleed for a while. They treated the area with silver nitrate and was eventually sent home.    Patient is returning for a FBSE due to history of SCCis and AKs.    Per Pathology report on 9/12/2018:     A: Right ear pinna, biopsy:     - Actinic keratosis         B: Front of right ear, biopsy:     - Actinic keratosis      Sun Protection Habits: none   History of Severe/Blistering Sun Burns: none   Tanning Bed Use:  No    Past Medical History  Reviewed in EPIC, with pertinent problems noted in HPI and below.     Anything patient would like noted from medical record: none     Melanoma: No   Non-melanoma skin cancer:   10/8/2021  • SCC in situ -- Right superior preauricular cheek, s/p Efudex BID 3/3/3    Treated cheeks, temples and superior helices with Efudex BID x 2 weeks Winter [id number].    ALLERGIES:  No Known Allergies    Social  History  Occupation: retired   Household: lives at home with wife     Family History  Family history of skin cancer--No family history of skin cancer     Review of Systems  CONSTITUTIONAL: No recent fevers or recent illness.   HEME/LYMPH: Yes easy bleeding, swollen glands, Is currently on blood thinner medications.   SKIN: No other skin changes, itching or bleeding skin lesions, except as noted above.     Physical Exam, Assessment and Plan  CONSTITUTIONAL: The patient is well-groomed and well-developed.   NEURO: Patient is alert and oriented x3; Normal mood and affect.   SKIN: Examination of the skin included the head (including face, scalp, ears), neck, right upper extremity, left upper extremity, chest, back, abdomen, right lower extremity, left lower extremity, including palms and soles. Examination of the above areas, as well as buttock/groin/genitalia regions, offered and performed, with the exception of Groin and Genital region, which was declined.   Entire exam performed in the presence of a medical assistant chaperone.   Skin exam unremarkable except as noted below.     History of SCCis (see above for details regarding location, type, and treatment)  Description: Scar at site of prior skin cancer on the right superior preauricular cheek  Ddx: No evidence of recurrence with inspection or palpation.  Plan:   - Advised patient to contact clinic if any new or changing lesions are identified. Recommended sun protection, including the use of SPF 50 or greater sunscreen.  - Patient directed to follow-up for any new concerning, symptomatic, bleeding, growing, or changing skin lesions.     Actinic Keratoses  Description: Multiple gritty erythematous papules:  Locations: Right dorsal hand x1; Left preauricular cheek x2; Left superior helix x2; Left lower ear x1  Plan:    - Premalignant nature and treatment options reviewed. Reviewed sun protection and self-examinations.  Reviewed treatment options including  monitoring, cryotherapy, and field treatment (Efudex).  Recommended cryotherapy for treatment.  Discussed expected skin response, healing time, pain/discomfort, crusting, possible blister formation and risk of hypopigmentation and scar.  Patient indicated understanding and elected to proceed.  Lesion(s) treated with a 10-15 second freeze/thaw cycle, 2 cycles per lesion, to 6 total lesion(s).  Patient tolerated well. The patient was instructed to return if the lesions do not heal and resolve.    Seborrheic Keratoses  Description: Scattered stuck-on appearing waxy brown papules on the bilateral upper extremities   Plan:   - Discussed diagnosis.  Reassured of benign nature.  SKs tend to increase in incidence and number with increasing age.  SKs are asymptomatic, but when irritated or traumatized, may become pruritic or painful.  No treatment needed unless bothersome.     - Recommended Amlactin daily as needed.     Cherry Angioma(s)  Description: Scattered bright red to purple vascular papules and macules on trunk and extremities.  Plan:   - Discussed diagnosis.  Reassured of benign nature.  No treatment needed unless bothersome.     Return in about 1 year (around 10/6/2023) for FBSE.    I, Dr. Cristine Wolfe, attest that I saw and examined the patient and agree with the above documentation as scribed.  The documentation recorded by the scribe accurately and completely reflects the service(s) I personally performed and the decisions made by me.     Vaccine status unknown

## 2023-05-23 NOTE — ED PROVIDER NOTE - CLINICAL SUMMARY MEDICAL DECISION MAKING FREE TEXT BOX
53-year-old female past medical history of diabetes and high cholesterol presenting with a chief complaint of pain at the bottom of her feet and pain all over her body.  Patient states that symptoms began 2 weeks ago.  Patient denies fevers, chills, chest pain, shortness of breath, headache, visual changes. VS. WNL. PE. reproducible pain at ball of feet.     Differential is not limited to diabetic neuropathy, musculoskeletal injury, fibromyalgia, rhabdomyolysis, matias neuroma.  Pain basic labs, CK and give patient pain medication.  Dispo is pending on labs and reassessment.

## 2023-05-23 NOTE — ED ADULT TRIAGE NOTE - CHIEF COMPLAINT QUOTE
Pt h/o DM presents for tingling/burning to hands and feet, and cramping pain throughout body for the past 2 weeks.

## 2023-05-23 NOTE — ED ADULT NURSE NOTE - NSFALLUNIVINTERV_ED_ALL_ED
Bed/Stretcher in lowest position, wheels locked, appropriate side rails in place/Call bell, personal items and telephone in reach/Instruct patient to call for assistance before getting out of bed/chair/stretcher/Non-slip footwear applied when patient is off stretcher/[place] to call system/Physically safe environment - no spills, clutter or unnecessary equipment/Purposeful proactive rounding/Room/bathroom lighting operational, light cord in reach

## 2023-05-23 NOTE — ED PROVIDER NOTE - NSFOLLOWUPINSTRUCTIONS_ED_ALL_ED_FT
Please follow up with a neurologist and rheumatologist within the next week.   Return to the ER for any new or concerning symptoms.   You may take 650 mg acetaminophen every eight hours as needed for pain.   Drink plenty of fluids and rest.    Paresthesia  Paresthesia is a burning or prickling feeling. This feeling can happen in any part of the body. It often happens in the hands, arms, legs, or feet. Usually, it is not painful. In most cases, the feeling goes away in a short time and is not a sign of a serious problem. If you have paresthesia that lasts a long time, you need to see your doctor.    Follow these instructions at home:  Nutrition    A plate with examples of foods in a healthy diet.  Eat a healthy diet. This includes:  Eating foods that are high in fiber. These include beans, whole grains, and fresh fruits and vegetables.  Limiting foods that are high in fat and sugar. These include fried or sweet foods.  Alcohol use    A sign showing that a person should not drink alcohol.  Do not drink alcohol if:  Your doctor tells you not to drink.  You are pregnant, may be pregnant, or are planning to become pregnant.  If you drink alcohol:  Limit how much you have to:  0–1 drink a day for women.  0–2 drinks a day for men.  Know how much alcohol is in your drink. In the [place] one drink equals one 12 oz bottle of beer (355 mL), one 5 oz glass of wine (148 mL), or one 1½ oz glass of hard liquor (44 mL).  General instructions    Take over-the-counter and prescription medicines only as told by your doctor.  Do not smoke or use any products that contain nicotine or tobacco. If you need help quitting, ask your doctor.  If you have diabetes, work with your doctor to make sure your blood sugar stays in a healthy range.  If your feet feel numb:  Check for redness, warmth, and swelling every day.  Wear padded socks and comfortable shoes. These help protect your feet.  Keep all follow-up visits.  Contact a doctor if:  You have paresthesia that gets worse or does not go away.  You lose feeling (have numbness) after an injury.  Your burning or prickling feeling gets worse when you walk.  You have pain or cramps.  You feel dizzy or you faint.  You have a rash.  Get help right away if:  You feel weak or have new weakness in an arm or leg.  You have trouble walking or moving.  You have problems speaking, understanding, or seeing.  You feel confused.  You cannot control when you pee (urinate) or poop (have a bowel movement).  These symptoms may be an emergency. Get help right away. Call 911.  Do not wait to see if the symptoms will go away.  Do not drive yourself to the hospital.  Summary  Paresthesia is a burning or prickling feeling. It often happens in the hands, arms, legs, or feet.  In most cases, the feeling goes away in a short time and is not a sign of a serious problem.  If you have paresthesia that lasts a long time, you need to be seen by your doctor.  This information is not intended to replace advice given to you by your health care provider. Make sure you discuss any questions you have with your health care provider.

## 2023-05-23 NOTE — ED PROVIDER NOTE - OBJECTIVE STATEMENT
53-year-old female past medical history of diabetes and high cholesterol presenting with a chief complaint of pain at the bottom of her feet and pain all over her body.  Patient states that symptoms began 2 weeks ago.  Patient states she went to [facility] had lab work done that was unremarkable.  Patient usually gets injections for pain but was not able to get injection.  States that when she gets these her symptoms usually improved.  Patient states that the pain is like a burning sensation with pins-and-needles at the bottom of her foot.  States that she has pain over the right shoulder her upper back and lower back and has been using Glydo cane patches with no symptom relief.  Was giving meloxicam and ibuprofen for symptoms but symptoms have persisted.  Patient states that she seen her PMD and neurologist with no symptom improvement.  Patient denies fevers, chills, chest pain, shortness of breath, headache, visual changes.

## 2023-05-23 NOTE — ED PROVIDER NOTE - PHYSICAL EXAMINATION
GENERAL: Awake, alert, NAD  HEENT: NC/AT, moist mucous membranes, PERRL, EOMI  LUNGS: CTAB, no wheezes or crackles   CARDIAC: RRR, no m/r/g  ABDOMEN: Soft, non tender, non distended, no rebound, no guarding  BACK: No midline spinal tenderness, no CVA tenderness  EXT: No edema, no calf tenderness, 2+ DP pulses bilaterally, no deformities. reproducible pain at ball of feet BL.   NEURO: A&Ox3. Moving all extremities.  SKIN: Warm and dry. No rash.  PSYCH: Normal affect.

## 2023-05-23 NOTE — ED PROVIDER NOTE - ATTENDING CONTRIBUTION TO CARE
53-year-old female past medical history diabetes, hyperlipidemia presents for chronic pain diffusely throughout her back and all 4 extremities has been worse for the past 2 weeks.  Patient reports pain has been present for years but worse for the past 2 weeks unprovoked.  Reports going to [facility] several weeks ago had unremarkable work-up and was discharged.  Patient reports is a burning/pins-and-needles like pain throughout her back extremities and especially on the bottom of her feet.  She has been using lidocaine patches without significant relief.  Also reports taking Motrin 600 mg every 6 hours.  Does report having been on gabapentin 600 mg every 6 hours in the past without significant relief as well.  Has not seen PCP or neurologist recently for her symptoms.  She denies headache, vision or hearing changes, chest pain, shortness of breath, nausea, vomiting.  She denies any dysuria hematuria.  Reports adherence with home medications.  Exam as above, cranial nerves II through XII intact.  Sensation intact in all extremities abdomen back and chest.  Motor intact in all 4 extremities.  Chronic pain worse for 2 weeks.  Description sounds primarily neuropathic.  Patient denies trauma.  Denies any recent illness.  Will check labs to assess for electrolyte abnormality, CPK.  Symptom relief.  Explained with patient that she will need outpatient follow-up for her symptoms ultimately she spent most time.

## 2023-05-23 NOTE — ED PROVIDER NOTE - PATIENT PORTAL LINK FT
You can access the FollowMyHealth Patient Portal offered by [facility] by registering at the following website: [email or web address]. By joining [facility]’s FollowMyHealth portal, you will also be able to view your health information using other applications (apps) compatible with our system.

## 2023-06-21 ENCOUNTER — NON-APPOINTMENT (OUTPATIENT)
Age: 53
End: 2023-06-21

## 2023-07-03 ENCOUNTER — APPOINTMENT (OUTPATIENT)
Dept: OTOLARYNGOLOGY | Facility: CLINIC | Age: 53
End: 2023-07-03
Payer: COMMERCIAL

## 2023-07-03 VITALS
TEMPERATURE: 97.3 F | HEIGHT: 64 IN | DIASTOLIC BLOOD PRESSURE: 84 MMHG | HEART RATE: 90 BPM | WEIGHT: 180 LBS | BODY MASS INDEX: 30.73 KG/M2 | SYSTOLIC BLOOD PRESSURE: 119 MMHG

## 2023-07-03 DIAGNOSIS — M26.609 UNSPECIFIED TEMPOROMANDIBULAR JOINT DISORDER: ICD-10-CM

## 2023-07-03 PROCEDURE — 99213 OFFICE O/P EST LOW 20 MIN: CPT

## 2023-07-04 PROBLEM — M26.609 TMJ DYSFUNCTION: Status: ACTIVE | Noted: 2022-09-13

## 2023-07-04 RX ORDER — SIMVASTATIN 20 MG/1
20 TABLET, FILM COATED ORAL
Qty: 30 | Refills: 0 | Status: ACTIVE | COMMUNITY
Start: 2023-06-26

## 2023-07-04 RX ORDER — FAMOTIDINE 40 MG/1
40 TABLET, FILM COATED ORAL
Qty: 30 | Refills: 0 | Status: ACTIVE | COMMUNITY
Start: 2023-06-26

## 2023-07-04 RX ORDER — GLIPIZIDE 10 MG/1
10 TABLET, FILM COATED, EXTENDED RELEASE ORAL
Qty: 30 | Refills: 0 | Status: ACTIVE | COMMUNITY
Start: 2023-06-26

## 2023-07-04 RX ORDER — BLOOD-GLUCOSE METER
EACH MISCELLANEOUS
Qty: 100 | Refills: 0 | Status: ACTIVE | COMMUNITY
Start: 2023-05-22

## 2023-07-04 RX ORDER — SITAGLIPTIN 100 MG/1
100 TABLET, FILM COATED ORAL
Qty: 30 | Refills: 0 | Status: ACTIVE | COMMUNITY
Start: 2023-06-26

## 2023-07-04 RX ORDER — ISOPROPYL ALCOHOL 0.75 G/1
SWAB TOPICAL
Qty: 100 | Refills: 0 | Status: ACTIVE | COMMUNITY
Start: 2023-05-22

## 2023-07-04 RX ORDER — FLUTICASONE PROPIONATE 50 UG/1
50 SPRAY, METERED NASAL
Qty: 16 | Refills: 0 | Status: ACTIVE | COMMUNITY
Start: 2023-06-26

## 2023-07-04 NOTE — DATA REVIEWED
[de-identified] : Hearing Test performed to evaluate the extent of hearing loss and  to explain pt's symptoms\par today's hearing test was personally reviewed and revealed\par barbyl [de-identified] : see PACS reports\par Mild PNS inflammation\par Brain and bllod vesslas-wnl\par Left scalp cyst

## 2023-07-04 NOTE — HISTORY OF PRESENT ILLNESS
[Ear Fullness] : ear fullness [Dizziness] : dizziness [Eustachian Tube Dysfunction] : eustachian tube dysfunction [Headache] : headache [Facial Pressure] : facial pressure [None] : No risk factors have been identified. [de-identified] : 52 yo\par PAtient complains of left sided ear pain and headache x several weeks. Sometimes pain gets so intense that it radiates to her head. Denies nasal congestion, ear drainage or hearing loss.  [FreeTextEntry1] : \par  [Hearing Loss] : no hearing loss [Allergic Rhinitis] : no allergic rhinitis [Early Onset Hearing Loss] : no early onset hearing loss [Stroke] : no stroke [Hyperthyroidism] : no hyperthyroidism [Sialadenitis] : no sialadenitis [Hodgkin Disease] : no hodgkin disease [Non-Hodgkin Lymphoma] : no non-hodgkin lymphoma [Graves Disease] : no graves disease [Thyroid Cancer] : no thyroid cancer

## 2023-07-04 NOTE — ASSESSMENT
[FreeTextEntry1] : Ms. BLACKBURN 53 year F complains of left sided ear pain with headache x several weeks \par \par Otalgia / Headache secondary to TMJ:\par -soft food diet \par -dental evaluation \par -Advil for pain \par -warm and cold compresses\par \par f/u prn

## 2023-07-04 NOTE — PHYSICAL EXAM
[] : septum deviated to the left [Midline] : trachea located in midline position [Normal] : no rashes [FreeTextEntry2] : scalp cyst-benign

## 2023-07-18 ENCOUNTER — APPOINTMENT (OUTPATIENT)
Dept: OBGYN | Facility: HOSPITAL | Age: 53
End: 2023-07-18

## 2023-07-20 ENCOUNTER — OUTPATIENT (OUTPATIENT)
Dept: OUTPATIENT SERVICES | Facility: HOSPITAL | Age: 53
LOS: 1 days | End: 2023-07-20

## 2023-07-20 ENCOUNTER — RESULT REVIEW (OUTPATIENT)
Age: 53
End: 2023-07-20

## 2023-07-20 ENCOUNTER — APPOINTMENT (OUTPATIENT)
Dept: OBGYN | Facility: HOSPITAL | Age: 53
End: 2023-07-20
Payer: COMMERCIAL

## 2023-07-20 VITALS
DIASTOLIC BLOOD PRESSURE: 82 MMHG | HEART RATE: 86 BPM | WEIGHT: 187 LBS | SYSTOLIC BLOOD PRESSURE: 113 MMHG | TEMPERATURE: 98.3 F | HEIGHT: 64 IN | BODY MASS INDEX: 31.92 KG/M2

## 2023-07-20 DIAGNOSIS — N93.9 ABNORMAL UTERINE AND VAGINAL BLEEDING, UNSPECIFIED: ICD-10-CM

## 2023-07-20 DIAGNOSIS — R22.30 LOCALIZED SWELLING, MASS AND LUMP, UNSPECIFIED UPPER LIMB: Chronic | ICD-10-CM

## 2023-07-20 LAB
T4 FREE SERPL-MCNC: 1 NG/DL — SIGNIFICANT CHANGE UP (ref 0.9–1.8)
TSH SERPL-MCNC: 1.28 UIU/ML — SIGNIFICANT CHANGE UP (ref 0.27–4.2)

## 2023-07-20 PROCEDURE — 99213 OFFICE O/P EST LOW 20 MIN: CPT | Mod: GC

## 2023-07-20 NOTE — PHYSICAL EXAM
[Soft] : soft [Non-tender] : non-tender [Examination Of The Breasts] : a normal appearance [Normal] : normal [No Masses] : no breast masses were palpable

## 2023-07-21 LAB — FSH SERPL-MCNC: 67.6 IU/L — SIGNIFICANT CHANGE UP

## 2023-07-24 DIAGNOSIS — N93.9 ABNORMAL UTERINE AND VAGINAL BLEEDING, UNSPECIFIED: ICD-10-CM

## 2023-07-25 ENCOUNTER — RESULT REVIEW (OUTPATIENT)
Age: 53
End: 2023-07-25

## 2023-07-25 ENCOUNTER — APPOINTMENT (OUTPATIENT)
Dept: MAMMOGRAPHY | Facility: IMAGING CENTER | Age: 53
End: 2023-07-25
Payer: COMMERCIAL

## 2023-07-25 ENCOUNTER — OUTPATIENT (OUTPATIENT)
Dept: OUTPATIENT SERVICES | Facility: HOSPITAL | Age: 53
LOS: 1 days | End: 2023-07-25
Payer: COMMERCIAL

## 2023-07-25 DIAGNOSIS — Z00.8 ENCOUNTER FOR OTHER GENERAL EXAMINATION: ICD-10-CM

## 2023-07-25 DIAGNOSIS — R22.30 LOCALIZED SWELLING, MASS AND LUMP, UNSPECIFIED UPPER LIMB: Chronic | ICD-10-CM

## 2023-07-25 PROCEDURE — 77067 SCR MAMMO BI INCL CAD: CPT

## 2023-07-25 PROCEDURE — 77063 BREAST TOMOSYNTHESIS BI: CPT

## 2023-07-25 PROCEDURE — 77063 BREAST TOMOSYNTHESIS BI: CPT | Mod: 26

## 2023-07-25 PROCEDURE — 77067 SCR MAMMO BI INCL CAD: CPT | Mod: 26

## 2023-07-26 ENCOUNTER — NON-APPOINTMENT (OUTPATIENT)
Age: 53
End: 2023-07-26

## 2023-08-03 ENCOUNTER — RESULT REVIEW (OUTPATIENT)
Age: 53
End: 2023-08-03

## 2023-08-03 ENCOUNTER — APPOINTMENT (OUTPATIENT)
Dept: RHEUMATOLOGY | Facility: CLINIC | Age: 53
End: 2023-08-03
Payer: COMMERCIAL

## 2023-08-03 VITALS
DIASTOLIC BLOOD PRESSURE: 81 MMHG | WEIGHT: 180 LBS | BODY MASS INDEX: 30.73 KG/M2 | HEIGHT: 64 IN | OXYGEN SATURATION: 98 % | SYSTOLIC BLOOD PRESSURE: 123 MMHG | HEART RATE: 103 BPM

## 2023-08-03 DIAGNOSIS — R20.2 PARESTHESIA OF SKIN: ICD-10-CM

## 2023-08-03 DIAGNOSIS — M72.2 PLANTAR FASCIAL FIBROMATOSIS: ICD-10-CM

## 2023-08-03 DIAGNOSIS — G43.901 MIGRAINE, UNSPECIFIED, NOT INTRACTABLE, WITH STATUS MIGRAINOSUS: ICD-10-CM

## 2023-08-03 DIAGNOSIS — R76.8 OTHER SPECIFIED ABNORMAL IMMUNOLOGICAL FINDINGS IN SERUM: ICD-10-CM

## 2023-08-03 PROCEDURE — 99204 OFFICE O/P NEW MOD 45 MIN: CPT

## 2023-08-03 RX ORDER — DICLOFENAC SODIUM 75 MG/1
75 TABLET, DELAYED RELEASE ORAL
Qty: 60 | Refills: 0 | Status: DISCONTINUED | COMMUNITY
Start: 2022-08-09 | End: 2023-08-03

## 2023-08-03 RX ORDER — NAPROXEN 500 MG/1
500 TABLET ORAL
Qty: 60 | Refills: 3 | Status: ACTIVE | COMMUNITY
Start: 2023-08-03 | End: 1900-01-01

## 2023-08-03 RX ORDER — GABAPENTIN 300 MG/1
300 CAPSULE ORAL 3 TIMES DAILY
Qty: 90 | Refills: 1 | Status: ACTIVE | COMMUNITY
Start: 2023-08-03 | End: 1900-01-01

## 2023-08-03 RX ORDER — PREGABALIN 25 MG/1
25 CAPSULE ORAL
Qty: 28 | Refills: 0 | Status: DISCONTINUED | COMMUNITY
Start: 2023-05-31 | End: 2023-08-03

## 2023-08-03 RX ORDER — IBUPROFEN 800 MG/1
800 TABLET, FILM COATED ORAL
Qty: 90 | Refills: 0 | Status: DISCONTINUED | COMMUNITY
Start: 2022-10-25 | End: 2023-08-03

## 2023-08-03 RX ORDER — DICLOFENAC SODIUM 1% 10 MG/G
1 GEL TOPICAL DAILY
Qty: 1 | Refills: 6 | Status: ACTIVE | COMMUNITY
Start: 2023-08-03 | End: 1900-01-01

## 2023-08-03 RX ORDER — ATORVASTATIN CALCIUM 40 MG/1
40 TABLET, FILM COATED ORAL AT BEDTIME
Refills: 0 | Status: DISCONTINUED | COMMUNITY
End: 2023-08-03

## 2023-08-03 RX ORDER — OMEPRAZOLE 20 MG/1
20 TABLET, DELAYED RELEASE ORAL DAILY
Refills: 0 | Status: DISCONTINUED | COMMUNITY
End: 2023-08-03

## 2023-08-03 RX ORDER — ERTUGLIFLOZIN 15 MG/1
15 TABLET, FILM COATED ORAL
Qty: 30 | Refills: 0 | Status: DISCONTINUED | COMMUNITY
Start: 2019-04-10 | End: 2023-08-03

## 2023-08-03 RX ORDER — METHOCARBAMOL 500 MG/1
500 TABLET, FILM COATED ORAL
Qty: 30 | Refills: 0 | Status: DISCONTINUED | COMMUNITY
Start: 2022-06-28 | End: 2023-08-03

## 2023-08-03 RX ORDER — GLIPIZIDE 10 MG/1
10 TABLET ORAL DAILY
Refills: 0 | Status: DISCONTINUED | COMMUNITY
End: 2023-08-03

## 2023-08-03 RX ORDER — CEPHALEXIN 500 MG/1
500 CAPSULE ORAL
Qty: 21 | Refills: 0 | Status: DISCONTINUED | COMMUNITY
Start: 2023-04-12 | End: 2023-08-03

## 2023-08-04 ENCOUNTER — RESULT REVIEW (OUTPATIENT)
Age: 53
End: 2023-08-04

## 2023-08-04 ENCOUNTER — APPOINTMENT (OUTPATIENT)
Dept: MAMMOGRAPHY | Facility: CLINIC | Age: 53
End: 2023-08-04
Payer: COMMERCIAL

## 2023-08-04 ENCOUNTER — APPOINTMENT (OUTPATIENT)
Dept: ULTRASOUND IMAGING | Facility: CLINIC | Age: 53
End: 2023-08-04
Payer: COMMERCIAL

## 2023-08-04 ENCOUNTER — OUTPATIENT (OUTPATIENT)
Dept: OUTPATIENT SERVICES | Facility: HOSPITAL | Age: 53
LOS: 1 days | End: 2023-08-04
Payer: COMMERCIAL

## 2023-08-04 DIAGNOSIS — Z12.31 ENCOUNTER FOR SCREENING MAMMOGRAM FOR MALIGNANT NEOPLASM OF BREAST: ICD-10-CM

## 2023-08-04 DIAGNOSIS — Z00.8 ENCOUNTER FOR OTHER GENERAL EXAMINATION: ICD-10-CM

## 2023-08-04 DIAGNOSIS — R22.30 LOCALIZED SWELLING, MASS AND LUMP, UNSPECIFIED UPPER LIMB: Chronic | ICD-10-CM

## 2023-08-04 PROCEDURE — 77065 DX MAMMO INCL CAD UNI: CPT | Mod: 26,RT

## 2023-08-04 PROCEDURE — 76641 ULTRASOUND BREAST COMPLETE: CPT | Mod: 26,RT

## 2023-08-04 PROCEDURE — G0279: CPT | Mod: 26

## 2023-08-04 PROCEDURE — G0279: CPT

## 2023-08-04 PROCEDURE — 76641 ULTRASOUND BREAST COMPLETE: CPT

## 2023-08-04 PROCEDURE — 77065 DX MAMMO INCL CAD UNI: CPT

## 2023-08-06 PROBLEM — R20.2 PARESTHESIA: Status: ACTIVE | Noted: 2023-08-06

## 2023-08-06 NOTE — PHYSICAL EXAM
[General Appearance - Alert] : alert [General Appearance - In No Acute Distress] : in no acute distress [Neck Appearance] : the appearance of the neck was normal [Neck Cervical Mass (___cm)] : no neck mass was observed [Jugular Venous Distention Increased] : there was no jugular-venous distention [Thyroid Diffuse Enlargement] : the thyroid was not enlarged [Thyroid Nodule] : there were no palpable thyroid nodules [Auscultation Breath Sounds / Voice Sounds] : lungs were clear to auscultation bilaterally [Heart Rate And Rhythm] : heart rate was normal and rhythm regular [Heart Sounds] : normal S1 and S2 [Heart Sounds Gallop] : no gallops [Murmurs] : no murmurs [Heart Sounds Pericardial Friction Rub] : no pericardial rub [Full Pulse] : the pedal pulses are present [Edema] : there was no peripheral edema [Bowel Sounds] : normal bowel sounds [Abdomen Soft] : soft [Abdomen Tenderness] : non-tender [Abdomen Mass (___ Cm)] : no abdominal mass palpated [Cervical Lymph Nodes Enlarged Posterior Bilaterally] : posterior cervical [Cervical Lymph Nodes Enlarged Anterior Bilaterally] : anterior cervical [No CVA Tenderness] : no ~M costovertebral angle tenderness [Supraclavicular Lymph Nodes Enlarged Bilaterally] : supraclavicular [No Spinal Tenderness] : no spinal tenderness [Abnormal Walk] : normal gait [Musculoskeletal - Swelling] : no joint swelling seen [FreeTextEntry1] : right shoudler with decreased ROM and pain on movement - decreased ROM over neck and pain on movement over the lumbar spine - no active synovitis - all other joints with full ROM [Skin Color & Pigmentation] : normal skin color and pigmentation [] : no rash [Skin Turgor] : normal skin turgor [Oriented To Time, Place, And Person] : oriented to person, place, and time [Impaired Insight] : insight and judgment were intact [Affect] : the affect was normal

## 2023-08-06 NOTE — ASSESSMENT
[FreeTextEntry1] : patient with a hx of DM2 - with ongoing paresthesia over hands and feet for over 1 year, now worsening that is now constant on gabapentin 300 mg but as needed - not daily. has a hx of lumbar and cervical stneosis as well send for EMG r/o diabetic neuropathy or carpal tunnel  add basic rheum labs - low suspicion for rheumatic disease at this time.  referral to PT for shoulder rotator cuff tear - can use diclofenc gel as needed start gabapentin daily - avoid NSAIDS - naproxen given to take as needed when pain is severe patient has a hx of gastric ulcers.  patient to call in 1 week to discuss results and next steps   f/u 8-10 weeks

## 2023-08-06 NOTE — HISTORY OF PRESENT ILLNESS
[FreeTextEntry1] : patient with a hx of DM2 - with ongoing paresthesia over hands and feet for over 1 year, now worsening that is now constant on gabapentin 300 mg but as needed - not daily. hx of migraines - takes ibuprofen as needed low back pain - after falling 4 times -  MRi done in 2018 as follow L1/L2: No disc degeneration or height loss. No disc bulge or herniation. No spinal canal or foraminal stenosis. L2/L3: No disc degeneration or height loss. There is a tiny central disc protrusion without significant spinal canal stenosis. No neural foraminal narrowing. L3/L4: There is no disc degeneration or height loss. Mild disc bulge abuts the ventral thecal sac without significant spinal canal stenosis. There is mild to moderate degenerative arthrosis of the bilateral facet joints. No neural foraminal stenosis. L4/L5: No disc degeneration or height loss. Mild disc bulge abuts the ventral thecal sac without significant spinal canal stenosis. There is moderate degenerative arthrosis of the bilateral facet joints. There is minimal bilateral neural foraminal narrowing. L5/S1: No disc degeneration or height loss. No disc bulge or herniation. No spinal canal or foraminal stenosis. Small right nerve root sleeve cyst.  has a near full thickness tear of the supraspinatus - seen by ortho - patient has defer srugery at this time - has completed PT  Denies any fevers, chill, rashes, weight loss,fatigue,  hair loss,, dry eyes or mouth, mouth sores, Raynaud's. chest pain or SOB, GI or , numbness/tingling Pregnancy losses

## 2023-08-07 ENCOUNTER — NON-APPOINTMENT (OUTPATIENT)
Age: 53
End: 2023-08-07

## 2023-08-09 LAB
CCP AB SER IA-ACNC: <8 UNITS
ENA RNP AB SER IA-ACNC: <0.2 AL
ENA SM AB SER IA-ACNC: <0.2 AL
ENA SS-A AB SER IA-ACNC: <0.2 AL
ENA SS-B AB SER IA-ACNC: <0.2 AL
RF+CCP IGG SER-IMP: NEGATIVE
RHEUMATOID FACT SER QL: <10 IU/ML

## 2023-08-15 ENCOUNTER — APPOINTMENT (OUTPATIENT)
Dept: PAIN MANAGEMENT | Facility: CLINIC | Age: 53
End: 2023-08-15
Payer: COMMERCIAL

## 2023-08-15 VITALS
WEIGHT: 180 LBS | DIASTOLIC BLOOD PRESSURE: 82 MMHG | HEIGHT: 64 IN | SYSTOLIC BLOOD PRESSURE: 116 MMHG | HEART RATE: 88 BPM | BODY MASS INDEX: 30.73 KG/M2

## 2023-08-15 PROCEDURE — 99204 OFFICE O/P NEW MOD 45 MIN: CPT

## 2023-08-15 RX ORDER — NAPROXEN 500 MG/1
500 TABLET ORAL
Qty: 28 | Refills: 0 | Status: DISCONTINUED | COMMUNITY
Start: 2023-05-30 | End: 2023-08-15

## 2023-08-15 NOTE — HISTORY OF PRESENT ILLNESS
[FreeTextEntry1] : Pt is here with headaches / migraines for 4-5 years.  Headache occurs at least 2x/ week - can last upto 24 hours.  The pain is frontal and top of head .  Feels pulsatile. + dizziness, +sensitive to light and sound , + nausea and vomiting  denies numbness, tingling or weakness during migraine .  + difficulty with memory.   Has taken TYlenol or Motrin - no relief .  Has been to ED   Sleep - very poor 4-5 hours Caffeine - decaff only Water -" I drink a lot"  Family hx of migraine - daughter  Concussion - denies MRI - none.  Denies smoking , denies ETOH  PMH: DM,GI ulcer

## 2023-08-15 NOTE — PHYSICAL EXAM
[General Appearance - Alert] : alert [General Appearance - In No Acute Distress] : in no acute distress [General Appearance - Well Nourished] : well nourished [General Appearance - Well Developed] : well developed [General Appearance - Well-Appearing] : healthy appearing [Oriented To Time, Place, And Person] : oriented to person, place, and time [Impaired Insight] : insight and judgment were intact [Affect] : the affect was normal [Mood] : the mood was normal [Memory Recent] : recent memory was not impaired [Memory Remote] : remote memory was not impaired [Cranial Nerves Facial (VII)] : face symmetrical [Cranial Nerves Accessory (XI - Cranial And Spinal)] : head turning and shoulder shrug symmetric [Cranial Nerves Hypoglossal (XII)] : there was no tongue deviation with protrusion [Motor Strength] : muscle strength was normal in all four extremities [Involuntary Movements] : no involuntary movements were seen [No Muscle Atrophy] : normal bulk in all four extremities [Paresis Pronator Drift Right-Sided] : no pronator drift on the right [Paresis Pronator Drift Left-Sided] : no pronator drift on the left [Motor Strength Upper Extremities Bilaterally] : strength was normal in both upper extremities [Motor Strength Lower Extremities Bilaterally] : strength was normal in both lower extremities [Coordination - Dysmetria Impaired Finger-to-Nose Bilateral] : not present [2+] : Patella left 2+ [Sclera] : the sclera and conjunctiva were normal [PERRL With Normal Accommodation] : pupils were equal in size, round, reactive to light, with normal accommodation [Extraocular Movements] : extraocular movements were intact [] : no respiratory distress [Edema] : there was no peripheral edema

## 2023-08-17 ENCOUNTER — NON-APPOINTMENT (OUTPATIENT)
Age: 53
End: 2023-08-17

## 2023-08-17 NOTE — HISTORY OF PRESENT ILLNESS
[Patient reported PAP Smear was normal] : Patient reported PAP Smear was normal [FreeTextEntry1] : 52yo  perimenopausal woman returning to clinic to discuss treatment options for abnormal uterine bleeding. She has a history of heavy and painful periods. She was seen with our clinic in mid-, she reveived a TVUS which showed 2 submucosal fibroids. She also had an EMB done with showed lower uterine segment polyp fragments mixed with benign endocervical tissue, however endometrium was not properly sampled. She declined to repeat an EMB at that time, and was counseled to consider surgical vs medical management and return to clinic after she makes a decision on a treatment option. \par \par Today, she is presenting with continued painful periods and heavy bleeding. She has not made a final decision on treatment option. Over the last 6 months she has also began having hot flashes, and her monthly period now arrives every 1-2 months. She desires counseling on next steps to manage her bleeding and pain.

## 2023-08-17 NOTE — PLAN
[FreeTextEntry1] : 54yo perimenopausal woman presenting to clinic for discussion of treatment options for AUB. She has 2 submucosal fibroids on TVUS and had an EMB showing lower uterine polyp fragments but improper sampling of the endometrium. At the time, options of hysterectomy vs myomectomy vs medical management were offered to her. Today after discussion, she is willing to go to booking clinic for a D&C/hysteroscopy.  #AUB - sending TSH, fT4, FSH  - does not want another EMB - booking for D&C/hysteroscopy for further evaluation - explained to patient that she needs medical clearance from her PCP  #Health maintenance - Pap smear in 2022, up to date - Breast exam done today, mammogram ordered   Discussed with Toi Greenberg PGY1

## 2023-08-18 ENCOUNTER — OUTPATIENT (OUTPATIENT)
Dept: OUTPATIENT SERVICES | Facility: HOSPITAL | Age: 53
LOS: 1 days | End: 2023-08-18
Payer: COMMERCIAL

## 2023-08-18 ENCOUNTER — APPOINTMENT (OUTPATIENT)
Dept: ULTRASOUND IMAGING | Facility: CLINIC | Age: 53
End: 2023-08-18
Payer: COMMERCIAL

## 2023-08-18 DIAGNOSIS — M72.2 PLANTAR FASCIAL FIBROMATOSIS: ICD-10-CM

## 2023-08-18 DIAGNOSIS — R22.30 LOCALIZED SWELLING, MASS AND LUMP, UNSPECIFIED UPPER LIMB: Chronic | ICD-10-CM

## 2023-08-18 PROCEDURE — 76882 US LMTD JT/FCL EVL NVASC XTR: CPT | Mod: 26,LT

## 2023-08-18 PROCEDURE — 76882 US LMTD JT/FCL EVL NVASC XTR: CPT

## 2023-08-18 NOTE — ED ADULT TRIAGE NOTE - ARRIVAL FROM
[Appropriately responsive] : appropriately responsive [Alert] : alert [No Acute Distress] : no acute distress [No Lymphadenopathy] : no lymphadenopathy [Regular Rate Rhythm] : regular rate rhythm [No Murmurs] : no murmurs [Clear to Auscultation B/L] : clear to auscultation bilaterally [Soft] : soft [Non-tender] : non-tender Home [Non-distended] : non-distended [No HSM] : No HSM [No Lesions] : no lesions [No Mass] : no mass [Oriented x3] : oriented x3 [Examination Of The Breasts] : a normal appearance [No Masses] : no breast masses were palpable [Labia Majora] : normal [Labia Minora] : normal [Normal] : normal [Uterine Adnexae] : normal

## 2023-09-03 ENCOUNTER — APPOINTMENT (OUTPATIENT)
Dept: MRI IMAGING | Facility: IMAGING CENTER | Age: 53
End: 2023-09-03
Payer: COMMERCIAL

## 2023-09-03 ENCOUNTER — OUTPATIENT (OUTPATIENT)
Dept: OUTPATIENT SERVICES | Facility: HOSPITAL | Age: 53
LOS: 1 days | End: 2023-09-03
Payer: COMMERCIAL

## 2023-09-03 DIAGNOSIS — R22.30 LOCALIZED SWELLING, MASS AND LUMP, UNSPECIFIED UPPER LIMB: Chronic | ICD-10-CM

## 2023-09-03 DIAGNOSIS — G43.901 MIGRAINE, UNSPECIFIED, NOT INTRACTABLE, WITH STATUS MIGRAINOSUS: ICD-10-CM

## 2023-09-03 PROCEDURE — 70551 MRI BRAIN STEM W/O DYE: CPT | Mod: 26

## 2023-09-03 PROCEDURE — 70551 MRI BRAIN STEM W/O DYE: CPT

## 2023-09-07 ENCOUNTER — NON-APPOINTMENT (OUTPATIENT)
Age: 53
End: 2023-09-07

## 2023-09-28 NOTE — ED PROVIDER NOTE - PSYCHIATRIC, MLM
[facility] Contact  RUST/Voicemail     Clinical Data: Post-Discharge Outreach     Outreach attempted x 2.  Left message on patient's voicemail, providing [facility]'s 24/7 scheduling and nurse triage phone number [phone] ([phone]) for questions/concerns and/or to schedule an appt with an [facility] provider, if they do not have a PCP.      Plan:  [facility] will do no further outreaches at this time.       Karmen Mcneli  Community Health Worker  [facility], [facility]  [phone]      *Connected Care Resource Team does NOT follow patient ongoing. Referrals are identified based on internal discharge reports and the outreach is to ensure patient has an understanding of their discharge instructions.  
Alert and oriented to person, place, time/situation. normal mood and affect. no apparent risk to self or others.

## 2023-10-28 NOTE — ED PROVIDER NOTE - NSCAREINITIATED _GEN_ER
Anthony Santos(Resident) 9yr F presented with edema, proteinuria, microsocopic hematuria, elevated ASO and low C3. Consistent with diagnosis of PIGN. Treatment is supportive care with diuretics and blood pressure control.

## 2023-11-16 ENCOUNTER — APPOINTMENT (OUTPATIENT)
Dept: PAIN MANAGEMENT | Facility: CLINIC | Age: 53
End: 2023-11-16
Payer: COMMERCIAL

## 2023-11-16 VITALS
WEIGHT: 180 LBS | SYSTOLIC BLOOD PRESSURE: 124 MMHG | BODY MASS INDEX: 30.73 KG/M2 | HEART RATE: 89 BPM | HEIGHT: 64 IN | DIASTOLIC BLOOD PRESSURE: 85 MMHG

## 2023-11-16 PROCEDURE — 99213 OFFICE O/P EST LOW 20 MIN: CPT

## 2023-11-16 RX ORDER — TOPIRAMATE 25 MG/1
25 TABLET, FILM COATED ORAL
Qty: 120 | Refills: 2 | Status: ACTIVE | COMMUNITY
Start: 2023-08-18 | End: 1900-01-01

## 2023-11-20 ENCOUNTER — EMERGENCY (EMERGENCY)
Facility: HOSPITAL | Age: 53
LOS: 1 days | Discharge: HOME | End: 2023-11-20
Attending: EMERGENCY MEDICINE | Admitting: EMERGENCY MEDICINE
Payer: COMMERCIAL

## 2023-11-20 VITALS
SYSTOLIC BLOOD PRESSURE: 138 MMHG | RESPIRATION RATE: 15 BRPM | TEMPERATURE: 98 F | OXYGEN SATURATION: 100 % | HEART RATE: 95 BPM | DIASTOLIC BLOOD PRESSURE: 78 MMHG

## 2023-11-20 DIAGNOSIS — R22.30 LOCALIZED SWELLING, MASS AND LUMP, UNSPECIFIED UPPER LIMB: Chronic | ICD-10-CM

## 2023-11-20 PROCEDURE — 93010 ELECTROCARDIOGRAM REPORT: CPT | Mod: 1L

## 2023-11-20 PROCEDURE — 99284 EMERGENCY DEPT VISIT MOD MDM: CPT | Mod: 25

## 2023-11-20 RX ORDER — LORATADINE 10 MG/1
10 TABLET ORAL ONCE
Refills: 0 | Status: COMPLETED | OUTPATIENT
Start: 2023-11-20 | End: 2023-11-20

## 2023-11-20 RX ORDER — CETIRIZINE HYDROCHLORIDE 10 MG/1
1 TABLET ORAL
Qty: 14 | Refills: 0
Start: 2023-11-20 | End: 2023-12-03

## 2023-11-20 RX ADMIN — LORATADINE 10 MILLIGRAM(S): 10 TABLET ORAL at 06:51

## 2023-11-20 NOTE — ED ADULT TRIAGE NOTE - CHIEF COMPLAINT QUOTE
presents C/O B/L leg swelling. Pt also c/O left eye pain and swelling. No complaints of chest pain, headache, nausea, dizziness, vomiting  SOB, fever, chills verbalized. PHx DM2(no insulin pump)

## 2023-11-20 NOTE — ED ADULT NURSE NOTE - OBJECTIVE STATEMENT
Pt received from triage on stretcher to rm#7, A/Ox4 answers all questions appropriately. C/O B/L leg swelling accompanied with left eye pain. Pt denies chest pain and SOB during initial assessment, breathing is even and unlabored on room air. Abdomin is soft and non distended, non tender to touch. Pt ambulates independently at base line, moves all four extremities on command, skin is intact. Pt resting comfortably at the bedside, no apprant distress noted during initial assessment. Vital signs stable, NKA to medications. PMHx DM. Pending provider orders.

## 2023-11-20 NOTE — ED PROVIDER NOTE - PATIENT PORTAL LINK FT
You can access the FollowMyHealth Patient Portal offered by [facility] by registering at the following website: [email or web address]. By joining [employer]’s FollowMyHealth portal, you will also be able to view your health information using other applications (apps) compatible with our system.

## 2023-11-20 NOTE — ED PROVIDER NOTE - CLINICAL SUMMARY MEDICAL DECISION MAKING FREE TEXT BOX
53-year-old female patient past medical history of diabetes, high cholesterol, CVA who presents to the emergency department for 1 week of worsening proximal leg swelling that is been worsening since onset.  Patient tried to visit her PCP on Friday, but was evaluated by the medical assistant instead who endorsed her to visit the emergency department for evaluation.  Patient endorses that the swelling is associated with heaviness that is worse with ambulation.  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  Denies any chest pain, shortness of breath, nausea, vomiting, diarrhea, abdominal pain, fevers.  Of note, endorses left eyelid itchiness with eye tearing and ear pain.  On exam, found with no pitting edema to distal or proximal bilateral lower extremities.  Patient localizes heaviness towards her proximal thighs.  No signs of cellulitis such as edema, erythema or warmth and patient is denying fever at this time.  Patient's bilateral eyes found with clear conjunctiva and no signs of conjunctivitis at this time.  Patient endorsing more rhinitis, dry cough, sneezing as of recent.  Will give Zyrtec and send some to her pharmacy.  Will evaluate with EKG prior to discharge.  No concerns of a blood clot or an infection at this time.  Patient has been extensively educated on utilizing compression stockings and elevating lower extremities for her symptoms.  Will most likely discharge.

## 2023-11-20 NOTE — ED ADULT NURSE NOTE - NS ED NOTE ABUSE RESPONSE YN
[Healthy Appearing] : healthy appearing [Interactive] : interactive [Normal Appearance] : normal appearance [Well formed] : well formed [Abdomen Soft] : soft [Overweight] : overweight [WNL for age] : within normal limits of age [Normal S1 and S2] : normal S1 and S2 [Clear to Ausculation Bilaterally] : clear to auscultation bilaterally [Normal] : normal [Acanthosis Nigricans___] : no acanthosis nigricans [Murmur] : no murmurs [Mild Diffuse Bilateral Wheezing] : no mild diffuse wheezing [de-identified] : glasses [de-identified] : defer Yes

## 2023-11-20 NOTE — ED ADULT TRIAGE NOTE - SPO2 (%)
Pt was discharged today and is going to [facility].Notified Rehab of discharge. Educated the pt via avs in regards to medication changes,follow up appts,clinical documents. He had no further questions at this time. It was a pleasure taking care of the pt.   
100

## 2023-11-20 NOTE — ED PROVIDER NOTE - ATTENDING CONTRIBUTION TO CARE
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  Patient tried to visit her PCP on Friday, but was evaluated by the medical assistant instead who endorsed her to visit the emergency department for evaluation.  Patient endorses that the swelling is associated with heaviness that is worse with ambulation.  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  Denies any chest pain, shortness of breath, nausea, vomiting, diarrhea, abdominal pain, fevers.  Of note, endorses left eyelid itchiness with eye tearing and ear pain.  Of note, pt has had this in past, reports that she had leg edema in past, was sent to a specialist (does not recall which one or who) and was told nothing to do and was not found to have a cause. Pt reports she was neve given any water pills and has never had a blood clot. At this time no SOB, chest pain, headache.     EXAM: Right shoulder with large lipoma at shoulder, BLE without pitting edema from bilateral hips to feet, normal pulses, skin normal, no open wounds/trauma, normal and equal appearing legs, heart RRR, lungs ctab, no crackles/rales/rhonci/wheezing. Eyes EOMI/PERRL, no injection, no erythema, no swelling,     MDM: 52 yo F with NIDDM, HLD, and previous CVA that has no residual deficits that presents with what she describes as leg swelling and heaviness. Has had this in past but went to a specialist and told nothing wrong. Never had heart issues per pt report, never on any water pills. At this time, no evidence of edema on BLE as pt is describing, no signs of trauma and is ambulatory. Works as a HHA. Eyes EOMI/PERRL, no signs trauma and no erythema or injection. AT this time, explained to pt that we don't have any evidence of her condition on exam, she has no chest pain, SOB or other complaints that would be CHF exacerbation as no history of CHF. No DVT concerns as she has no unilateral edema, no risk factors for DVT, no signs infection, no cancer risk factors. Chart review shows pt has been in/out of our ED in past few years with concerns of swelling in multiple areas of body including neck in past, legs, back. I tried to elucidate if pt use of the word "swelling" correctly but she does state she understands what the word means and is accurate to describe her conditions. AT this time, will provide her loratadine for her eye itchiness and swelling as this may be an allergic reaction. No vision changes reported and no signs of infection in her eyes. She has a PMD that she can f/u with outpt. Likely discharge.

## 2023-11-20 NOTE — ED PROVIDER NOTE - NSFOLLOWUPINSTRUCTIONS_ED_ALL_ED_FT
You were seen in the emergency department for concerns of leg swelling and heaviness.  It is important to use compression stockings at work and to elevate bilateral legs.    Zyrtec was sent to your pharmacy for allergic rhinitis.    You may take Tylenol and/or Motrin for any pain control.    Please follow-up with your primary care doctor for reevaluation and further management.

## 2023-12-29 ENCOUNTER — EMERGENCY (EMERGENCY)
Facility: HOSPITAL | Age: 53
LOS: 1 days | Discharge: HOME | End: 2023-12-29
Admitting: EMERGENCY MEDICINE
Payer: COMMERCIAL

## 2023-12-29 VITALS
HEART RATE: 108 BPM | TEMPERATURE: 100 F | DIASTOLIC BLOOD PRESSURE: 71 MMHG | OXYGEN SATURATION: 98 % | RESPIRATION RATE: 16 BRPM | SYSTOLIC BLOOD PRESSURE: 104 MMHG

## 2023-12-29 DIAGNOSIS — R22.30 LOCALIZED SWELLING, MASS AND LUMP, UNSPECIFIED UPPER LIMB: Chronic | ICD-10-CM

## 2023-12-29 LAB
ALBUMIN SERPL ELPH-MCNC: 4.6 G/DL — SIGNIFICANT CHANGE UP (ref 3.3–5)
ALBUMIN SERPL ELPH-MCNC: 4.6 G/DL — SIGNIFICANT CHANGE UP (ref 3.3–5)
ALP SERPL-CCNC: 65 U/L — SIGNIFICANT CHANGE UP (ref 40–120)
ALP SERPL-CCNC: 65 U/L — SIGNIFICANT CHANGE UP (ref 40–120)
ALT FLD-CCNC: 29 U/L — SIGNIFICANT CHANGE UP (ref 4–33)
ALT FLD-CCNC: 29 U/L — SIGNIFICANT CHANGE UP (ref 4–33)
ANION GAP SERPL CALC-SCNC: 13 MMOL/L — SIGNIFICANT CHANGE UP (ref 7–14)
ANION GAP SERPL CALC-SCNC: 13 MMOL/L — SIGNIFICANT CHANGE UP (ref 7–14)
APTT BLD: 29.2 SEC — SIGNIFICANT CHANGE UP (ref 24.5–35.6)
APTT BLD: 29.2 SEC — SIGNIFICANT CHANGE UP (ref 24.5–35.6)
AST SERPL-CCNC: 42 U/L — HIGH (ref 4–32)
AST SERPL-CCNC: 42 U/L — HIGH (ref 4–32)
BASOPHILS # BLD AUTO: 0.05 K/UL — SIGNIFICANT CHANGE UP (ref 0–0.2)
BASOPHILS # BLD AUTO: 0.05 K/UL — SIGNIFICANT CHANGE UP (ref 0–0.2)
BASOPHILS NFR BLD AUTO: 0.9 % — SIGNIFICANT CHANGE UP (ref 0–2)
BASOPHILS NFR BLD AUTO: 0.9 % — SIGNIFICANT CHANGE UP (ref 0–2)
BILIRUB SERPL-MCNC: 0.3 MG/DL — SIGNIFICANT CHANGE UP (ref 0.2–1.2)
BILIRUB SERPL-MCNC: 0.3 MG/DL — SIGNIFICANT CHANGE UP (ref 0.2–1.2)
BUN SERPL-MCNC: 6 MG/DL — LOW (ref 7–23)
BUN SERPL-MCNC: 6 MG/DL — LOW (ref 7–23)
CALCIUM SERPL-MCNC: 9.2 MG/DL — SIGNIFICANT CHANGE UP (ref 8.4–10.5)
CALCIUM SERPL-MCNC: 9.2 MG/DL — SIGNIFICANT CHANGE UP (ref 8.4–10.5)
CHLORIDE SERPL-SCNC: 100 MMOL/L — SIGNIFICANT CHANGE UP (ref 98–107)
CHLORIDE SERPL-SCNC: 100 MMOL/L — SIGNIFICANT CHANGE UP (ref 98–107)
CO2 SERPL-SCNC: 27 MMOL/L — SIGNIFICANT CHANGE UP (ref 22–31)
CO2 SERPL-SCNC: 27 MMOL/L — SIGNIFICANT CHANGE UP (ref 22–31)
CREAT SERPL-MCNC: 0.71 MG/DL — SIGNIFICANT CHANGE UP (ref 0.5–1.3)
CREAT SERPL-MCNC: 0.71 MG/DL — SIGNIFICANT CHANGE UP (ref 0.5–1.3)
EGFR: 101 ML/MIN/1.73M2 — SIGNIFICANT CHANGE UP
EGFR: 101 ML/MIN/1.73M2 — SIGNIFICANT CHANGE UP
EOSINOPHIL # BLD AUTO: 0.06 K/UL — SIGNIFICANT CHANGE UP (ref 0–0.5)
EOSINOPHIL # BLD AUTO: 0.06 K/UL — SIGNIFICANT CHANGE UP (ref 0–0.5)
EOSINOPHIL NFR BLD AUTO: 1 % — SIGNIFICANT CHANGE UP (ref 0–6)
EOSINOPHIL NFR BLD AUTO: 1 % — SIGNIFICANT CHANGE UP (ref 0–6)
FLUAV AG NPH QL: DETECTED
FLUAV AG NPH QL: DETECTED
FLUBV AG NPH QL: SIGNIFICANT CHANGE UP
FLUBV AG NPH QL: SIGNIFICANT CHANGE UP
GLUCOSE SERPL-MCNC: 154 MG/DL — HIGH (ref 70–99)
GLUCOSE SERPL-MCNC: 154 MG/DL — HIGH (ref 70–99)
HCT VFR BLD CALC: 41.7 % — SIGNIFICANT CHANGE UP (ref 34.5–45)
HCT VFR BLD CALC: 41.7 % — SIGNIFICANT CHANGE UP (ref 34.5–45)
HGB BLD-MCNC: 13.9 G/DL — SIGNIFICANT CHANGE UP (ref 11.5–15.5)
HGB BLD-MCNC: 13.9 G/DL — SIGNIFICANT CHANGE UP (ref 11.5–15.5)
IANC: 3.67 K/UL — SIGNIFICANT CHANGE UP (ref 1.8–7.4)
IANC: 3.67 K/UL — SIGNIFICANT CHANGE UP (ref 1.8–7.4)
IMM GRANULOCYTES NFR BLD AUTO: 0.5 % — SIGNIFICANT CHANGE UP (ref 0–0.9)
IMM GRANULOCYTES NFR BLD AUTO: 0.5 % — SIGNIFICANT CHANGE UP (ref 0–0.9)
INR BLD: 1.05 RATIO — SIGNIFICANT CHANGE UP (ref 0.85–1.18)
INR BLD: 1.05 RATIO — SIGNIFICANT CHANGE UP (ref 0.85–1.18)
LYMPHOCYTES # BLD AUTO: 1.22 K/UL — SIGNIFICANT CHANGE UP (ref 1–3.3)
LYMPHOCYTES # BLD AUTO: 1.22 K/UL — SIGNIFICANT CHANGE UP (ref 1–3.3)
LYMPHOCYTES # BLD AUTO: 20.9 % — SIGNIFICANT CHANGE UP (ref 13–44)
LYMPHOCYTES # BLD AUTO: 20.9 % — SIGNIFICANT CHANGE UP (ref 13–44)
MCHC RBC-ENTMCNC: 30.9 PG — SIGNIFICANT CHANGE UP (ref 27–34)
MCHC RBC-ENTMCNC: 30.9 PG — SIGNIFICANT CHANGE UP (ref 27–34)
MCHC RBC-ENTMCNC: 33.3 GM/DL — SIGNIFICANT CHANGE UP (ref 32–36)
MCHC RBC-ENTMCNC: 33.3 GM/DL — SIGNIFICANT CHANGE UP (ref 32–36)
MCV RBC AUTO: 92.7 FL — SIGNIFICANT CHANGE UP (ref 80–100)
MCV RBC AUTO: 92.7 FL — SIGNIFICANT CHANGE UP (ref 80–100)
MONOCYTES # BLD AUTO: 0.8 K/UL — SIGNIFICANT CHANGE UP (ref 0–0.9)
MONOCYTES # BLD AUTO: 0.8 K/UL — SIGNIFICANT CHANGE UP (ref 0–0.9)
MONOCYTES NFR BLD AUTO: 13.7 % — SIGNIFICANT CHANGE UP (ref 2–14)
MONOCYTES NFR BLD AUTO: 13.7 % — SIGNIFICANT CHANGE UP (ref 2–14)
NEUTROPHILS # BLD AUTO: 3.67 K/UL — SIGNIFICANT CHANGE UP (ref 1.8–7.4)
NEUTROPHILS # BLD AUTO: 3.67 K/UL — SIGNIFICANT CHANGE UP (ref 1.8–7.4)
NEUTROPHILS NFR BLD AUTO: 63 % — SIGNIFICANT CHANGE UP (ref 43–77)
NEUTROPHILS NFR BLD AUTO: 63 % — SIGNIFICANT CHANGE UP (ref 43–77)
NRBC # BLD: 0 /100 WBCS — SIGNIFICANT CHANGE UP (ref 0–0)
NRBC # BLD: 0 /100 WBCS — SIGNIFICANT CHANGE UP (ref 0–0)
NRBC # FLD: 0 K/UL — SIGNIFICANT CHANGE UP (ref 0–0)
NRBC # FLD: 0 K/UL — SIGNIFICANT CHANGE UP (ref 0–0)
PLATELET # BLD AUTO: 257 K/UL — SIGNIFICANT CHANGE UP (ref 150–400)
PLATELET # BLD AUTO: 257 K/UL — SIGNIFICANT CHANGE UP (ref 150–400)
POTASSIUM SERPL-MCNC: 4.3 MMOL/L — SIGNIFICANT CHANGE UP (ref 3.5–5.3)
POTASSIUM SERPL-MCNC: 4.3 MMOL/L — SIGNIFICANT CHANGE UP (ref 3.5–5.3)
POTASSIUM SERPL-SCNC: 4.3 MMOL/L — SIGNIFICANT CHANGE UP (ref 3.5–5.3)
POTASSIUM SERPL-SCNC: 4.3 MMOL/L — SIGNIFICANT CHANGE UP (ref 3.5–5.3)
PROT SERPL-MCNC: 8.1 G/DL — SIGNIFICANT CHANGE UP (ref 6–8.3)
PROT SERPL-MCNC: 8.1 G/DL — SIGNIFICANT CHANGE UP (ref 6–8.3)
PROTHROM AB SERPL-ACNC: 11.8 SEC — SIGNIFICANT CHANGE UP (ref 9.5–13)
PROTHROM AB SERPL-ACNC: 11.8 SEC — SIGNIFICANT CHANGE UP (ref 9.5–13)
RBC # BLD: 4.5 M/UL — SIGNIFICANT CHANGE UP (ref 3.8–5.2)
RBC # BLD: 4.5 M/UL — SIGNIFICANT CHANGE UP (ref 3.8–5.2)
RBC # FLD: 13.2 % — SIGNIFICANT CHANGE UP (ref 10.3–14.5)
RBC # FLD: 13.2 % — SIGNIFICANT CHANGE UP (ref 10.3–14.5)
RSV RNA NPH QL NAA+NON-PROBE: SIGNIFICANT CHANGE UP
RSV RNA NPH QL NAA+NON-PROBE: SIGNIFICANT CHANGE UP
SARS-COV-2 RNA SPEC QL NAA+PROBE: SIGNIFICANT CHANGE UP
SARS-COV-2 RNA SPEC QL NAA+PROBE: SIGNIFICANT CHANGE UP
SODIUM SERPL-SCNC: 140 MMOL/L — SIGNIFICANT CHANGE UP (ref 135–145)
SODIUM SERPL-SCNC: 140 MMOL/L — SIGNIFICANT CHANGE UP (ref 135–145)
WBC # BLD: 5.83 K/UL — SIGNIFICANT CHANGE UP (ref 3.8–10.5)
WBC # BLD: 5.83 K/UL — SIGNIFICANT CHANGE UP (ref 3.8–10.5)
WBC # FLD AUTO: 5.83 K/UL — SIGNIFICANT CHANGE UP (ref 3.8–10.5)
WBC # FLD AUTO: 5.83 K/UL — SIGNIFICANT CHANGE UP (ref 3.8–10.5)

## 2023-12-29 PROCEDURE — 99284 EMERGENCY DEPT VISIT MOD MDM: CPT

## 2023-12-29 PROCEDURE — 93971 EXTREMITY STUDY: CPT | Mod: 26,LT

## 2023-12-29 PROCEDURE — 71046 X-RAY EXAM CHEST 2 VIEWS: CPT | Mod: 26

## 2023-12-29 RX ORDER — METOCLOPRAMIDE HCL 10 MG
10 TABLET ORAL ONCE
Refills: 0 | Status: COMPLETED | OUTPATIENT
Start: 2023-12-29 | End: 2023-12-29

## 2023-12-29 RX ORDER — ACETAMINOPHEN 500 MG
1000 TABLET ORAL ONCE
Refills: 0 | Status: COMPLETED | OUTPATIENT
Start: 2023-12-29 | End: 2023-12-29

## 2023-12-29 RX ORDER — IBUPROFEN 200 MG
1 TABLET ORAL
Qty: 25 | Refills: 0
Start: 2023-12-29

## 2023-12-29 RX ORDER — SODIUM CHLORIDE 9 MG/ML
1000 INJECTION INTRAMUSCULAR; INTRAVENOUS; SUBCUTANEOUS ONCE
Refills: 0 | Status: COMPLETED | OUTPATIENT
Start: 2023-12-29 | End: 2023-12-29

## 2023-12-29 RX ORDER — ACETAMINOPHEN 500 MG
2 TABLET ORAL
Qty: 25 | Refills: 0
Start: 2023-12-29

## 2023-12-29 RX ORDER — TRIAMCINOLONE ACETONIDE 55 MCG
1 AEROSOL, SPRAY (GRAM) NASAL
Qty: 1 | Refills: 0
Start: 2023-12-29

## 2023-12-29 RX ORDER — KETOROLAC TROMETHAMINE 30 MG/ML
30 SYRINGE (ML) INJECTION ONCE
Refills: 0 | Status: DISCONTINUED | OUTPATIENT
Start: 2023-12-29 | End: 2023-12-29

## 2023-12-29 RX ADMIN — Medication 30 MILLIGRAM(S): at 20:27

## 2023-12-29 RX ADMIN — SODIUM CHLORIDE 1000 MILLILITER(S): 9 INJECTION INTRAMUSCULAR; INTRAVENOUS; SUBCUTANEOUS at 20:28

## 2023-12-29 RX ADMIN — Medication 400 MILLIGRAM(S): at 20:27

## 2023-12-29 RX ADMIN — Medication 10 MILLIGRAM(S): at 22:00

## 2023-12-29 NOTE — ED ADULT TRIAGE NOTE - CHIEF COMPLAINT QUOTE
pt c/o cough/ body aches, flu like symptoms with headache, fevers, x 4 days. past med hx hld, dm2 fs= 182

## 2023-12-29 NOTE — ED PROVIDER NOTE - CLINICAL SUMMARY MEDICAL DECISION MAKING FREE TEXT BOX
Patient is a 53-year-old female, past medical history of hypertension, hyperlipidemia, diabetes presenting with a complaint of generalized body pain associated with cough, nasal congestion, subjective fever and chills for few days. Patient is a 53-year-old female, past medical history of hypertension, hyperlipidemia, diabetes presenting with a complaint of generalized body pain associated with cough, nasal congestion, subjective fever and chills for few days. On presentation patient nontoxic-appearing, symptoms likely due to viral syndrome.  Unilateral left leg swelling concerning for possible DVT.  Plan for routine labs including viral swab, chest x-ray, Doppler study, will treat with IV fluids, Tylenol, Toradol.

## 2023-12-29 NOTE — ED PROVIDER NOTE - NSFOLLOWUPINSTRUCTIONS_ED_ALL_ED_FT
INFLUENZA - General Information    Influenza    WHAT YOU NEED TO KNOW:    What is influenza? Influenza (the flu) is an infection caused by the influenza virus. The virus spreads through direct contact with someone who has the flu. For example, a person with the virus on his or her hands can spread it by shaking hands with someone. You may be able to spread the flu to others for 1 week or longer after signs or symptoms appear.    What increases my risk for the flu?    Living with or caring for someone who has the flu    Living in a nursing home or long-term care facility    Living in close quarters with others    A medical condition such as diabetes, cancer, heart disease, or lung disease    Pregnancy    Age older than 50 years    A weak immune system caused by HIV, AIDS, an organ transplant, or another condition    Traveling to places where other people have the flu  What are the signs and symptoms of the flu?    Fever and chills    Headaches, body aches, and muscle or joint pain    Cough, runny nose, and sore throat    Loss of appetite, nausea, vomiting, or diarrhea    Tiredness    Trouble breathing  How is the flu diagnosed? Your healthcare provider will examine you and ask if you have other health conditions. Tell your provider if you have traveled recently or been around anyone who is sick. Tell your provider if you are pregnant. A sample of fluid may be collected from your nose or throat to be tested for the flu virus.    How is the flu treated? Most people get better within a week. You may need any of the following:    Acetaminophen decreases pain and fever. It is available without a doctor's order. Ask how much to take and how often to take it. Follow directions. Read the labels of all other medicines you are using to see if they also contain acetaminophen, or ask your doctor or pharmacist. Acetaminophen can cause liver damage if not taken correctly.    NSAIDs, such as ibuprofen, help decrease swelling, pain, and fever. This medicine is available with or without a doctor's order. NSAIDs can cause stomach bleeding or kidney problems in certain people. If you take blood thinner medicine, always ask your healthcare provider if NSAIDs are safe for you. Always read the medicine label and follow directions.    Antivirals help fight a viral infection.  How can I manage my symptoms?    Rest as much as you can to help you recover.    Drink liquids as directed to help prevent dehydration. Ask how much liquid to drink each day and which liquids are best for you.  What can I do to prevent the spread of germs?      Wash your hands often. Wash your hands several times each day. Wash after you use the bathroom, change a child's diaper, and before you prepare or eat food. Use soap and water every time. Rub your soapy hands together, lacing your fingers. Wash the front and back of your hands, and in between your fingers. Use the fingers of one hand to scrub under the fingernails of the other hand. Wash for at least 20 seconds. Rinse with warm, running water for several seconds. Then dry your hands with a clean towel or paper towel. Use hand  that contains alcohol if soap and water are not available. Do not touch your eyes, nose, or mouth without washing your hands first.  Handwashing      Cover a sneeze or cough. Use a tissue that covers your mouth and nose. Throw the tissue away in a trash can right away. Use the bend of your arm if a tissue is not available. Wash your hands well with soap and water or use a hand .    Stay away from others while you are sick. Avoid crowds as much as possible.    Ask about vaccines you may need. Talk to your healthcare provider about your vaccine history. Your provider can tell you which vaccines you need, and when to get them.  Get the influenza (flu) vaccine as soon as recommended. The vaccine is usually available starting in September or October. Flu viruses change, so it is important to get a flu vaccine every year.    Get the pneumonia vaccine if recommended. This vaccine is usually recommended every 5 years. Your provider will tell you when to get this vaccine, if needed.  Call your local emergency number (911 in the [place]) if:    You have trouble breathing, and your lips look purple or blue.    You have a seizure.    You have new pain or pressure in your chest.  When should I seek immediate care?    You are dizzy, or you are urinating less or not at all.    You have a headache with a stiff neck, and you feel tired or confused.    You have new pain or pressure in your chest.    Your symptoms, such as shortness of breath, vomiting, or diarrhea, get worse.    Your symptoms, such as fever and coughing, seem to get better, but then get worse.  When should I call my doctor?    You have new muscle pain or weakness.    You have questions or concerns about your condition or care.  CARE AGREEMENT:    You have the right to help plan your care. Learn about your health condition and how it may be treated. Discuss treatment options with your healthcare providers to decide what care you want to receive. You always have the right to refuse treatment.

## 2023-12-29 NOTE — ED PROVIDER NOTE - PATIENT PORTAL LINK FT
You can access the FollowMyHealth Patient Portal offered by [facility] by registering at the following website: [email or web address]. By joining [employer]’s FollowMyHealth portal, you will also be able to view your health information using other applications (apps) compatible with our system. You can access the FollowMyHealth Patient Portal offered by [facility] by registering at the following website: [email or web address]. By joining [employer]’s FollowMyHealth portal, you will also be able to view your health information using other applications (apps) compatible with our system.

## 2023-12-29 NOTE — ED PROVIDER NOTE - OBJECTIVE STATEMENT
Patient is a 53-year-old female, past medical history of hypertension, hyperlipidemia, diabetes presenting with a complaint of generalized body pain associated with cough, nasal congestion, subjective fever and chills for few days.  Patient states that she was evaluated at the rapid COVID test performed which was negative.  Patient also endorsing unilateral left leg swelling denies recent travel, prolonged immobility/recent surgery/use of hormonal therapy prior to the onset of symptoms.  She is additionally denying chest pain, shortness of breath, palpitations, hematemesis, hemoptysis,

## 2023-12-29 NOTE — ED PROVIDER NOTE - MUSCULOSKELETAL, MLM
left lower extremity. larger than right. no redness, induration or acute signs of infection. FROM of all joints of the LLE intact. no sensory deficits. pulses present. Spine appears normal, range of motion is not limited, no muscle or joint tenderness

## 2023-12-29 NOTE — ED ADULT NURSE NOTE - OBJECTIVE STATEMENT
pt aox4, ambulatory with steady gait comes in for ongoing flu like symptoms included chills, fatigue, cough, cognestions and headache since Monday 12/25. pt was seen by PMD today and advised to come to ED for further eval. pt also complaining of left leg pain and swelling. pedal pulses present bilaterally. no obvious swelling noted on exam. respirations even and unlabored, no wheezing noted. right ac20g inserted using aseptic technique, +blood return and flushed well. pt medicated as per MD orders. stretcher in lowest position. pending imaging tests and dispo.

## 2024-02-15 NOTE — ED PROVIDER NOTE - CHIEF COMPLAINT
The patient is a 52y Female complaining of multiple medical complaints.
Simple: Patient demonstrates quick and easy understanding

## 2024-02-20 ENCOUNTER — APPOINTMENT (OUTPATIENT)
Dept: PAIN MANAGEMENT | Facility: CLINIC | Age: 54
End: 2024-02-20

## 2024-03-07 ENCOUNTER — APPOINTMENT (OUTPATIENT)
Dept: PULMONOLOGY | Facility: CLINIC | Age: 54
End: 2024-03-07
Payer: MEDICAID

## 2024-03-07 VITALS
OXYGEN SATURATION: 98 % | BODY MASS INDEX: 32.61 KG/M2 | HEIGHT: 64 IN | RESPIRATION RATE: 15 BRPM | WEIGHT: 191 LBS | TEMPERATURE: 97.6 F | SYSTOLIC BLOOD PRESSURE: 103 MMHG | DIASTOLIC BLOOD PRESSURE: 72 MMHG | HEART RATE: 86 BPM

## 2024-03-07 DIAGNOSIS — F51.04 PSYCHOPHYSIOLOGIC INSOMNIA: ICD-10-CM

## 2024-03-07 DIAGNOSIS — R06.83 SNORING: ICD-10-CM

## 2024-03-07 PROCEDURE — 99204 OFFICE O/P NEW MOD 45 MIN: CPT

## 2024-03-07 RX ORDER — ESZOPICLONE 1 MG/1
1 TABLET, FILM COATED ORAL AT BEDTIME
Qty: 15 | Refills: 0 | Status: ACTIVE | COMMUNITY
Start: 2024-03-07 | End: 1900-01-01

## 2024-03-07 NOTE — ASSESSMENT
[FreeTextEntry1] : This is a 54 year old female with a significant past medical history of chronic migrane who comes in for a sleep evaluation.  1. Insomnia: The patient has difficulty initiating sleep, with a delayed sleep onset time around midnight and a total sleep duration of approximately 6.5 hours. She has been experiencing these symptoms for two years, which may be contributing to her daytime fatigue and occasional dizziness. The use of Tylenol PM has not been effective, and recent weight gain may be a contributing factor to her sleep disturbance.   2. Weight Gain: The patient reports a significant weight gain of 20 pounds within the last year, which may be associated with her sleep issues and overall health.   3. Headaches: The patient occasionally wakes with headaches, which could be related to her poor sleep quality.  - Discontinue Tylenol PM and avoid using the bed for activities other than sleep to break the association of the bed with wakefulness.  - Implement sleep restriction therapy: instruct the patient to stay up until her usual sleep onset time (around midnight) and then attempt to sleep, gradually adjusting the time earlier in 15-minute increments weekly.  - Establish a relaxing bedtime routine, including drinking non-caffeinated tea like chamomile an hour before bed and engaging in monotonous or relaxing activities outside of the bedroom.  - Prescribe a sleeping aid, Lunesta (eszopiclone) 1 mg, with instructions to take it around 11:30 PM to 12 AM, but not to use it every night. A total of 15 pills will be prescribed to avoid dependency.  - Provide handouts on sleep hygiene and sleep restriction therapy for the patient to read and implement.  - Schedule a follow-up appointment in one month to evaluate the effectiveness of the medication and behavioral techniques.  The patient will follow up in one month to assess the response to the medication and behavioral interventions for her insomnia.

## 2024-03-07 NOTE — HISTORY OF PRESENT ILLNESS
[Never] : never [Snoring] : snoring [Awakes Unrefreshed] : awakes unrefreshed [Awakes with Headache] : awakes with headache [Recent  Weight Gain] : recent weight gain [DIS] : difficulty initiating sleep [Hypersomnolence] : hypersomnolence [TextBox_4] : This is a 54 year old female with a significant past medical history of chronic migraine who comes in for a sleep evaluation.  The patient presents with complaints of insomnia, stating she has difficulty falling asleep, often not feeling sleepy until around 11:45 PM to 12 AM despite going to bed at 9 or 10 PM. She reports taking Tylenol PM at 7 PM but does not find it effective in inducing sleep. The patient wakes up at 6:30 AM feeling tired and occasionally dizzy, suggesting inadequate rest. She sometimes wakes up in the middle of the night but does not have trouble falling back asleep. The patient denies experiencing dry mouth upon waking but reports occasional headaches. She has been experiencing these sleep issues for approximately two years. The patient also mentions recent weight gain, approximately 20 pounds over the last year, and denies snoring or observed apneas during sleep. She has been taking Tylenol PM or Motrin nightly for an unspecified duration. 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 She has not fallen asleep at work but feels the urge to nap, which she resists due to her job demands. The patient completed a sleep questionnaire with a score of 11. She has not tried any remedies other than over-the-counter medications and green tea before bed, which she has been advised against due to its caffeine content. [Awakes with Dry Mouth] : does not awaken with dry mouth [DMS] : does not have difficulty maintaining sleep [Unusual Sleep Behavior] : no unusual sleep behavior [Lower Extremity Discomfort] : does not have lower extremity discomfort [Irresistible urge to move legs] : does not have irresistible urge to move legs [TextBox_77] : 10pm [Late day/ Evening symptoms] : does not have late day/ evening symptoms [TextBox_89] : 0-1 [TextBox_79] : 6:30am [TextBox_81] :  [ESS] : 11

## 2024-03-07 NOTE — REVIEW OF SYSTEMS
[Recent Wt Gain (___ Lbs)] : ~T recent [unfilled] lb weight gain [EDS] : EDS [Nocturia] : nocturia [Negative] : Endocrine

## 2024-03-07 NOTE — PHYSICAL EXAM
[No Acute Distress] : no acute distress [Well Nourished] : well nourished [No Deformities] : no deformities [Normal Appearance] : normal appearance [No Neck Mass] : no neck mass [Normal Rate/Rhythm] : normal rate/rhythm [Normal S1, S2] : normal s1, s2 [No Murmurs] : no murmurs [No Resp Distress] : no resp distress [No Clubbing] : no clubbing [Clear to Auscultation Bilaterally] : clear to auscultation bilaterally [No Cyanosis] : no cyanosis [No Edema] : no edema [No Focal Deficits] : no focal deficits [Oriented x3] : oriented x3 [Normal Mood] : normal mood [Normal Affect] : normal affect [Normal Oropharynx] : normal oropharynx [II] : Mallampati Class: II [2+] : Right Tonsil: 2+ [Normal Gait] : normal gait

## 2024-03-08 NOTE — ED PROVIDER NOTE - ENMT, MLM
Addended by: CANTU, CLAUDIA on: 3/8/2024 10:02 AM     Modules accepted: Orders     Airway patent, Nasal mucosa clear. Mouth with normal mucosa. Throat has no vesicles, no oropharyngeal exudates and uvula is midline.

## 2024-03-11 NOTE — ED ADULT TRIAGE NOTE - GLASGOW COMA SCALE: SCORE, MLM
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

## 2024-03-25 NOTE — ED ADULT NURSE REASSESSMENT NOTE - NEURO SENSATION
sensory intact
sensory intact
Bed/Stretcher in lowest position, wheels locked, appropriate side rails in place/Call bell, personal items and telephone in reach/Instruct patient to call for assistance before getting out of bed/chair/stretcher/Non-slip footwear applied when patient is off stretcher/[place] to call system/Physically safe environment - no spills, clutter or unnecessary equipment/Purposeful proactive rounding/Room/bathroom lighting operational, light cord in reach

## 2024-04-16 ENCOUNTER — APPOINTMENT (OUTPATIENT)
Dept: PULMONOLOGY | Facility: CLINIC | Age: 54
End: 2024-04-16

## 2024-05-16 ENCOUNTER — EMERGENCY (EMERGENCY)
Facility: HOSPITAL | Age: 54
LOS: 1 days | Discharge: HOME | End: 2024-05-16
Attending: EMERGENCY MEDICINE | Admitting: EMERGENCY MEDICINE
Payer: MEDICAID

## 2024-05-16 VITALS
RESPIRATION RATE: 16 BRPM | TEMPERATURE: 98 F | SYSTOLIC BLOOD PRESSURE: 141 MMHG | OXYGEN SATURATION: 100 % | HEART RATE: 85 BPM | DIASTOLIC BLOOD PRESSURE: 91 MMHG

## 2024-05-16 VITALS
DIASTOLIC BLOOD PRESSURE: 75 MMHG | TEMPERATURE: 100 F | HEART RATE: 90 BPM | OXYGEN SATURATION: 100 % | SYSTOLIC BLOOD PRESSURE: 121 MMHG | RESPIRATION RATE: 18 BRPM

## 2024-05-16 DIAGNOSIS — R22.30 LOCALIZED SWELLING, MASS AND LUMP, UNSPECIFIED UPPER LIMB: Chronic | ICD-10-CM

## 2024-05-16 LAB
ALBUMIN SERPL ELPH-MCNC: 4.6 G/DL — SIGNIFICANT CHANGE UP (ref 3.3–5)
ALP SERPL-CCNC: 75 U/L — SIGNIFICANT CHANGE UP (ref 40–120)
ALT FLD-CCNC: 23 U/L — SIGNIFICANT CHANGE UP (ref 4–33)
ANION GAP SERPL CALC-SCNC: 13 MMOL/L — SIGNIFICANT CHANGE UP (ref 7–14)
APTT BLD: 32.4 SEC — SIGNIFICANT CHANGE UP (ref 24.5–35.6)
AST SERPL-CCNC: 19 U/L — SIGNIFICANT CHANGE UP (ref 4–32)
BASOPHILS # BLD AUTO: 0.05 K/UL — SIGNIFICANT CHANGE UP (ref 0–0.2)
BASOPHILS NFR BLD AUTO: 0.7 % — SIGNIFICANT CHANGE UP (ref 0–2)
BILIRUB SERPL-MCNC: 0.2 MG/DL — SIGNIFICANT CHANGE UP (ref 0.2–1.2)
BLD GP AB SCN SERPL QL: NEGATIVE — SIGNIFICANT CHANGE UP
BLOOD GAS VENOUS COMPREHENSIVE RESULT: SIGNIFICANT CHANGE UP
BUN SERPL-MCNC: 11 MG/DL — SIGNIFICANT CHANGE UP (ref 7–23)
CALCIUM SERPL-MCNC: 9.7 MG/DL — SIGNIFICANT CHANGE UP (ref 8.4–10.5)
CHLORIDE SERPL-SCNC: 100 MMOL/L — SIGNIFICANT CHANGE UP (ref 98–107)
CO2 SERPL-SCNC: 27 MMOL/L — SIGNIFICANT CHANGE UP (ref 22–31)
CREAT SERPL-MCNC: 0.64 MG/DL — SIGNIFICANT CHANGE UP (ref 0.5–1.3)
EGFR: 104 ML/MIN/1.73M2 — SIGNIFICANT CHANGE UP
EOSINOPHIL # BLD AUTO: 0.47 K/UL — SIGNIFICANT CHANGE UP (ref 0–0.5)
EOSINOPHIL NFR BLD AUTO: 6.7 % — HIGH (ref 0–6)
FLUAV AG NPH QL: SIGNIFICANT CHANGE UP
FLUBV AG NPH QL: SIGNIFICANT CHANGE UP
GLUCOSE SERPL-MCNC: 171 MG/DL — HIGH (ref 70–99)
HCT VFR BLD CALC: 37.9 % — SIGNIFICANT CHANGE UP (ref 34.5–45)
HGB BLD-MCNC: 13.5 G/DL — SIGNIFICANT CHANGE UP (ref 11.5–15.5)
IANC: 2.7 K/UL — SIGNIFICANT CHANGE UP (ref 1.8–7.4)
IMM GRANULOCYTES NFR BLD AUTO: 0.3 % — SIGNIFICANT CHANGE UP (ref 0–0.9)
INR BLD: 0.95 RATIO — SIGNIFICANT CHANGE UP (ref 0.85–1.18)
LYMPHOCYTES # BLD AUTO: 3.34 K/UL — HIGH (ref 1–3.3)
LYMPHOCYTES # BLD AUTO: 47.4 % — HIGH (ref 13–44)
MCHC RBC-ENTMCNC: 31.7 PG — SIGNIFICANT CHANGE UP (ref 27–34)
MCHC RBC-ENTMCNC: 35.6 GM/DL — SIGNIFICANT CHANGE UP (ref 32–36)
MCV RBC AUTO: 89 FL — SIGNIFICANT CHANGE UP (ref 80–100)
MONOCYTES # BLD AUTO: 0.46 K/UL — SIGNIFICANT CHANGE UP (ref 0–0.9)
MONOCYTES NFR BLD AUTO: 6.5 % — SIGNIFICANT CHANGE UP (ref 2–14)
NEUTROPHILS # BLD AUTO: 2.7 K/UL — SIGNIFICANT CHANGE UP (ref 1.8–7.4)
NEUTROPHILS NFR BLD AUTO: 38.4 % — LOW (ref 43–77)
NRBC # BLD: 0 /100 WBCS — SIGNIFICANT CHANGE UP (ref 0–0)
NRBC # FLD: 0 K/UL — SIGNIFICANT CHANGE UP (ref 0–0)
NT-PROBNP SERPL-SCNC: <36 PG/ML — SIGNIFICANT CHANGE UP
PLATELET # BLD AUTO: 258 K/UL — SIGNIFICANT CHANGE UP (ref 150–400)
POTASSIUM SERPL-MCNC: 4.1 MMOL/L — SIGNIFICANT CHANGE UP (ref 3.5–5.3)
POTASSIUM SERPL-SCNC: 4.1 MMOL/L — SIGNIFICANT CHANGE UP (ref 3.5–5.3)
PROT SERPL-MCNC: 7.9 G/DL — SIGNIFICANT CHANGE UP (ref 6–8.3)
PROTHROM AB SERPL-ACNC: 10.8 SEC — SIGNIFICANT CHANGE UP (ref 9.5–13)
RBC # BLD: 4.26 M/UL — SIGNIFICANT CHANGE UP (ref 3.8–5.2)
RBC # FLD: 12.2 % — SIGNIFICANT CHANGE UP (ref 10.3–14.5)
RH IG SCN BLD-IMP: POSITIVE — SIGNIFICANT CHANGE UP
RSV RNA NPH QL NAA+NON-PROBE: SIGNIFICANT CHANGE UP
SARS-COV-2 RNA SPEC QL NAA+PROBE: SIGNIFICANT CHANGE UP
SODIUM SERPL-SCNC: 140 MMOL/L — SIGNIFICANT CHANGE UP (ref 135–145)
TROPONIN T, HIGH SENSITIVITY RESULT: 6 NG/L — SIGNIFICANT CHANGE UP
WBC # BLD: 7.04 K/UL — SIGNIFICANT CHANGE UP (ref 3.8–10.5)
WBC # FLD AUTO: 7.04 K/UL — SIGNIFICANT CHANGE UP (ref 3.8–10.5)

## 2024-05-16 PROCEDURE — 74177 CT ABD & PELVIS W/CONTRAST: CPT | Mod: 26,MC

## 2024-05-16 PROCEDURE — 71046 X-RAY EXAM CHEST 2 VIEWS: CPT | Mod: 26

## 2024-05-16 PROCEDURE — 93010 ELECTROCARDIOGRAM REPORT: CPT

## 2024-05-16 PROCEDURE — 99285 EMERGENCY DEPT VISIT HI MDM: CPT

## 2024-05-16 RX ORDER — KETOROLAC TROMETHAMINE 30 MG/ML
15 SYRINGE (ML) INJECTION ONCE
Refills: 0 | Status: DISCONTINUED | OUTPATIENT
Start: 2024-05-16 | End: 2024-05-17

## 2024-05-16 RX ORDER — ACETAMINOPHEN 500 MG
1000 TABLET ORAL ONCE
Refills: 0 | Status: COMPLETED | OUTPATIENT
Start: 2024-05-16 | End: 2024-05-16

## 2024-05-16 RX ORDER — SODIUM CHLORIDE 9 MG/ML
1000 INJECTION INTRAMUSCULAR; INTRAVENOUS; SUBCUTANEOUS ONCE
Refills: 0 | Status: COMPLETED | OUTPATIENT
Start: 2024-05-16 | End: 2024-05-16

## 2024-05-16 RX ADMIN — SODIUM CHLORIDE 1000 MILLILITER(S): 9 INJECTION INTRAMUSCULAR; INTRAVENOUS; SUBCUTANEOUS at 20:54

## 2024-05-16 RX ADMIN — Medication 400 MILLIGRAM(S): at 20:54

## 2024-05-16 NOTE — ED PROVIDER NOTE - NSFOLLOWUPCLINICSTOKEN_GEN_ALL_ED_FT
[id number]: || ||00\01||False;[id number]: || ||00\01||False;[id number]: || ||00\01||False;[id number]: || ||00\01||False;[id number]: || ||00\01||False;

## 2024-05-16 NOTE — ED PROVIDER NOTE - NSFOLLOWUPINSTRUCTIONS_ED_ALL_ED_FT
Body Pains  Diabetes    - Imaging did not show any acute findings that explain your pain.   - Can continue with mild healthy diet, avoid sugar and high fat foods. Hydrate well    For Pain:  You can take Tylenol 1000mg every 6-8 hours as needed.  You can take Motrin (ibuprofun) 600mg every 6-8 hours as needed.    Follow up with a primary care provider in next week.  Return to the ER for worsening or persistent symptoms, and/or ANY NEW OR CONCERNING SYMPTOMS.

## 2024-05-16 NOTE — ED ADULT NURSE NOTE - IS THE PATIENT ABLE TO BE SCREENED?
Patient:  Radha Vaughan 57 y.o. female     05/01/24      Chiefcomplaint:   Chief Complaint   Patient presents with    Sinusitis    Allergies     Problems and Overview     Allergies are bad right now. Happens every spring  Astelin bid, flonase, claritin    Patient Active Problem List    Diagnosis Date Noted    History of back surgery 07/12/2023     Fusion upper lumbar in the 80s.       Closed displaced fracture of distal epiphysis of right femur with routine healing 03/17/2023    Raynaud's disease 02/02/2022    Chronic sinusitis 02/02/2022     Flonase, Astelin, nasal saline, Xyzal      GIST (gastrointestinal stromal tumor), non-malignant 10/08/2021     GI stromal Schumer noted by FNA in the past.  Underwent multiple EUS and CAT scans.  Was told no surgical intervention was necessary but to follow-up in 1 year this was about 5 or 6 years ago and she has not had any follow-up.  Felt traumatized by the experience of being told there was concern for pancreatic cancer and so has been reluctant to have any follow-up.        Prevention:  Health Maintenance Due   Topic Date Due    Hepatitis B vaccine (1 of 3 - 3-dose series) Never done    HIV screen  Never done    Hepatitis C screen  Never done    Cervical cancer screen  Never done    Colorectal Cancer Screen  Never done    Breast cancer screen  Never done    Shingles vaccine (1 of 2) Never done    COVID-19 Vaccine (3 - 2023-24 season) 09/01/2023    Depression Screen  05/30/2024      Meds prior:  Current Outpatient Medications   Medication Instructions    acetaminophen (TYLENOL) 500 mg, Oral, 2 times daily, 2 tabs    azelastine (ASTELIN) 0.1 % nasal spray 2 sprays, Nasal, 2 TIMES DAILY, instill 2 sprays into each nostril twice a day as directed    Azelastine HCl 137 MCG/SPRAY SOLN instill 2 sprays into each nostril twice a day as directed    diclofenac sodium (VOLTAREN) 4 g, Topical, 4 TIMES DAILY    diclofenac-miSOPROStol (ARTHROTEC 50) 50-0.2 MG per tablet 1 tablet, Oral,  Yes

## 2024-05-16 NOTE — ED PROVIDER NOTE - NSFOLLOWUPCLINICS_GEN_ALL_ED_FT
[facility] [place] [facility]  General Internal Medicine  [address] [place] [address]  Phone: [phone]  Fax:     [facility]  Internal Medicine  [address] [place] [address]  Phone: [phone]  Fax: [phone]    [facility]  Internal Medicine  [address] [place] [address]  Phone: [phone]  Fax: [phone]    [facility]  Internal Medicine  [address] [place] [address]  Phone: [phone]  Fax:     [facility]  Internal Medicine  [address] [place] [id number]  Phone: [phone]  Fax:

## 2024-05-16 NOTE — ED ADULT NURSE NOTE - OBJECTIVE STATEMENT
Pt is A&O x 4, ambulatory w/o assistance, shows no signs of acute distress. Presents to the ED w/ increasing generalized body pain and chest pain x 2 weeks (L>R). Pt also endorsing generalized fever/chills and headaches as well. Denies recent sick contact. Also denies n/v/d, dizziness/blurry vision, or SOB. VSS upon arrival. Respirations even and unlabored. Right AC 20 gauge IV placed and labs drawn. Medications administered. Pending lab results, XR results, and CT.

## 2024-05-16 NOTE — ED PROVIDER NOTE - ADDITIONAL NOTES AND INSTRUCTIONS:
To Whom it May Concern - Patient seen and evaluated in Emergency Room. Please excuse the above individual for medical care and recovery until date above. Thank you for you care. - Margarita Rodriguez MD.

## 2024-05-16 NOTE — ED PROVIDER NOTE - CLINICAL SUMMARY MEDICAL DECISION MAKING FREE TEXT BOX
Patient is a 54-year-old female past medical history hypertension, hyperlipidemia, diabetes presenting for body aches. With vital signs currently afebrile orally, within normal limits and stable.  Presenting with bodyaches, found of right lower quadrant tenderness on exam without peritoneal findings.  Differential includes but not limited to viral syndrome, versus muscular pain versus appendicitis versus less likely colitis or menstrual changes, evaluate labs, CT, give meds and reassess.

## 2024-05-16 NOTE — ED PROVIDER NOTE - PATIENT PORTAL LINK FT
You can access the FollowMyHealth Patient Portal offered by [facility] by registering at the following website: [email or web address]. By joining [employer]’s FollowMyHealth portal, you will also be able to view your health information using other applications (apps) compatible with our system.

## 2024-05-16 NOTE — ED PROVIDER NOTE - OBJECTIVE STATEMENT
Patient is a 54-year-old female past medical history hypertension, hyperlipidemia, diabetes presenting for body aches.  Patient states she has had diffuse bodyaches to her back, chest, bilateral lower extremities for approximately 1 week.  Not worse with exertion, not associated with shortness of breath and not pleuritic.  States that her left lower extremity has more pain in the right lower extremity.  No swelling.  Has had decreased appetite, without nausea or vomiting.  No stool changes or urinary complaints including dysuria.  No fever measured, endorses chills.  No cough, sore throat, recent travel, sick contacts.

## 2024-05-16 NOTE — ED PROVIDER NOTE - PHYSICAL EXAMINATION
CONSTITUTIONAL: awake; in no acute distress.   SKIN: warm, dry, no rash  HEAD: Normocephalic; atraumatic.  EYES: no conjunctival injection. no scleral icterus  ENT: No nasal discharge; airway clear.  NECK: Supple; non tender to midline. No meningismus  CARD: S1, S2 normal; no murmurs, gallops, or rubs. Regular rate and rhythm.   RESP: No wheezes, rales or rhonchi. Good air movement bilaterally.   ABD: soft +RLQ tenderness without rebound, no guarding, no distention, no rigidity. no cva tenderness  EXT: Ambulates independently.  No cyanosis or edema.   NEURO: Alert, oriented, grossly unremarkable  PSYCH: Cooperative, appropriate.

## 2024-05-16 NOTE — ED PROVIDER NOTE - PROGRESS NOTE DETAILS
Michael PGY3: Patient endorsed to me at sign out. Seen and assessed at bedside - still has body aches requesting 'strong pain meds' to go home with and glipizide. explained percocet not med choice for whole body pains. Recommended over-the-counter med and pcp

## 2024-05-16 NOTE — ED PROVIDER NOTE - ATTENDING CONTRIBUTION TO CARE
Attending note:   After face to face evaluation of this patient, I concur with above noted hx, pe, and care plan for this patient.  Dean: 54-year-old female with history of hypertension, hyperlipidemia and type 2 diabetes.  Patient presents to ED with 4 to 5 days of diffuse bodyaches.  Patient now noticed abdominal pain and slight come to the ED.  Patient is also having some chest pain and back pain and pain in the lower extremities.  Patient denies any shortness of breath or cough.  Patient has had some decreased appetite but no diarrhea or vomiting.  Patient denies any urinary complaints.  Patient denies any travel or sick contacts.  On exam patient is tender in the right lower quadrant.  Patient had low-grade temp initially but no tachycardia and blood pressure stable.  Patient is in no significant distress.  Lungs are clear and heart is regular rate and rhythm.  Oropharynx is normal.  There is no significant CVA tenderness.  Given right lower quadrant tenderness will get CT abdomen although symptoms may be viral in nature.  Will give symptomatic relief and reassess.

## 2024-05-17 LAB
APPEARANCE UR: CLEAR — SIGNIFICANT CHANGE UP
BACTERIA # UR AUTO: NEGATIVE /HPF — SIGNIFICANT CHANGE UP
BILIRUB UR-MCNC: NEGATIVE — SIGNIFICANT CHANGE UP
CAST: 0 /LPF — SIGNIFICANT CHANGE UP (ref 0–4)
COLOR SPEC: YELLOW — SIGNIFICANT CHANGE UP
DIFF PNL FLD: NEGATIVE — SIGNIFICANT CHANGE UP
GLUCOSE UR QL: 250 MG/DL
KETONES UR-MCNC: NEGATIVE MG/DL — SIGNIFICANT CHANGE UP
LEUKOCYTE ESTERASE UR-ACNC: NEGATIVE — SIGNIFICANT CHANGE UP
NITRITE UR-MCNC: NEGATIVE — SIGNIFICANT CHANGE UP
PH UR: 7 — SIGNIFICANT CHANGE UP (ref 5–8)
PROT UR-MCNC: NEGATIVE MG/DL — SIGNIFICANT CHANGE UP
RBC CASTS # UR COMP ASSIST: 1 /HPF — SIGNIFICANT CHANGE UP (ref 0–4)
SP GR SPEC: 1.02 — SIGNIFICANT CHANGE UP (ref 1–1.03)
SQUAMOUS # UR AUTO: 1 /HPF — SIGNIFICANT CHANGE UP (ref 0–5)
UROBILINOGEN FLD QL: 0.2 MG/DL — SIGNIFICANT CHANGE UP (ref 0.2–1)
WBC UR QL: 2 /HPF — SIGNIFICANT CHANGE UP (ref 0–5)

## 2024-05-17 PROCEDURE — 93970 EXTREMITY STUDY: CPT | Mod: 26

## 2024-05-17 RX ADMIN — Medication 15 MILLIGRAM(S): at 01:27

## 2024-05-18 LAB
CULTURE RESULTS: SIGNIFICANT CHANGE UP
SPECIMEN SOURCE: SIGNIFICANT CHANGE UP

## 2024-06-26 ENCOUNTER — APPOINTMENT (OUTPATIENT)
Dept: MAMMOGRAPHY | Facility: IMAGING CENTER | Age: 54
End: 2024-06-26
Payer: MEDICAID

## 2024-06-26 ENCOUNTER — NON-APPOINTMENT (OUTPATIENT)
Age: 54
End: 2024-06-26

## 2024-06-26 ENCOUNTER — RESULT REVIEW (OUTPATIENT)
Age: 54
End: 2024-06-26

## 2024-06-26 ENCOUNTER — APPOINTMENT (OUTPATIENT)
Dept: ULTRASOUND IMAGING | Facility: IMAGING CENTER | Age: 54
End: 2024-06-26
Payer: MEDICAID

## 2024-06-26 ENCOUNTER — OUTPATIENT (OUTPATIENT)
Dept: OUTPATIENT SERVICES | Facility: HOSPITAL | Age: 54
LOS: 1 days | End: 2024-06-26
Payer: MEDICAID

## 2024-06-26 DIAGNOSIS — Z00.8 ENCOUNTER FOR OTHER GENERAL EXAMINATION: ICD-10-CM

## 2024-06-26 DIAGNOSIS — R22.30 LOCALIZED SWELLING, MASS AND LUMP, UNSPECIFIED UPPER LIMB: Chronic | ICD-10-CM

## 2024-06-26 PROCEDURE — G0279: CPT | Mod: 26

## 2024-06-26 PROCEDURE — 77066 DX MAMMO INCL CAD BI: CPT

## 2024-06-26 PROCEDURE — 77066 DX MAMMO INCL CAD BI: CPT | Mod: 26

## 2024-06-26 PROCEDURE — 76642 ULTRASOUND BREAST LIMITED: CPT

## 2024-06-26 PROCEDURE — 76642 ULTRASOUND BREAST LIMITED: CPT | Mod: 26,RT

## 2024-06-26 PROCEDURE — G0279: CPT

## 2024-06-27 ENCOUNTER — NON-APPOINTMENT (OUTPATIENT)
Age: 54
End: 2024-06-27

## 2024-08-01 NOTE — ED ADULT NURSE NOTE - FINAL NURSING ELECTRONIC SIGNATURE
Evan Richey, thank you for referring EJ for comprehensive HCM evaluation. I included my note with complete details. Kenzie - can we set him up for mavacamten initiation and visit with me in about 6 weeks after initiation and after CPET? Carito - can we do CPET for exercise prescription and safety with HCM. Vashti - can we set him up with genetics? Thanks all!  07-Oct-2017 05:15

## 2024-09-08 ENCOUNTER — NON-APPOINTMENT (OUTPATIENT)
Age: 54
End: 2024-09-08

## 2024-09-09 ENCOUNTER — APPOINTMENT (OUTPATIENT)
Dept: ORTHOPEDIC SURGERY | Facility: CLINIC | Age: 54
End: 2024-09-09
Payer: MEDICAID

## 2024-09-09 VITALS
DIASTOLIC BLOOD PRESSURE: 71 MMHG | HEART RATE: 94 BPM | SYSTOLIC BLOOD PRESSURE: 107 MMHG | WEIGHT: 180 LBS | OXYGEN SATURATION: 99 % | BODY MASS INDEX: 28.93 KG/M2 | HEIGHT: 66 IN

## 2024-09-09 DIAGNOSIS — M51.36 OTHER INTERVERTEBRAL DISC DEGENERATION, LUMBAR REGION: ICD-10-CM

## 2024-09-09 DIAGNOSIS — M47.812 SPONDYLOSIS W/OUT MYELOPATHY OR RADICULOPATHY, CERVICAL REGION: ICD-10-CM

## 2024-09-09 PROCEDURE — 72100 X-RAY EXAM L-S SPINE 2/3 VWS: CPT

## 2024-09-09 PROCEDURE — 72040 X-RAY EXAM NECK SPINE 2-3 VW: CPT

## 2024-09-09 RX ORDER — DICLOFENAC POTASSIUM 25 MG/1
25 TABLET, COATED ORAL
Qty: 60 | Refills: 0 | Status: ACTIVE | COMMUNITY
Start: 2024-09-09 | End: 1900-01-01

## 2024-09-09 NOTE — PHYSICAL EXAM
[Normal] : Gait: normal [De Leon's Sign] : negative De Leon's sign [Pronator Drift] : negative pronator drift [SLR] : negative straight leg raise [de-identified] : 5 out of 5 motor strength, sensation is intact and symmetrical full range of motion flexion extension and rotation, no palpatory tenderness full range of motion of hips knees shoulders and elbows (all four extremities), no atrophy, negative straight leg raise, no pathological reflexes, no swelling, normal ambulation, no apparent distress skin is intact, no palpable lymph nodes, no upper or lower extremity instability, alert and oriented x3 and normal mood. Normal finger-to nose test. No upper motor neuron findings. Very large lipoma over right shoulder [de-identified] : XR AP Lat Cervical 09/09/2024 -C4-5 disc degenerative disease- reviewed with patient.    XR AP Lat Lumbar 09/09/2024 -mild lumbar disc degenerative disease- reviewed with patient.

## 2024-09-09 NOTE — ADDENDUM
[FreeTextEntry1] :  Discussed all options. All options discussed including rest, medicine, home exercise, acupuncture, Chiropractic care, Physical Therapy, Pain management, and last resort surgery. All questions were answered, all alternatives discussed, and the patient is in complete agreement with the treatment plan which the patient contributed to and discussed with me through the shared decision-making process. Follow-up appointment as instructed. Any issues and the patient will call or come in sooner.

## 2024-09-09 NOTE — DISCUSSION/SUMMARY
[de-identified] : C4-5 disc degenerative disease. Mild lumbar disc degenerative disease. Lipoma right shoulder- seen on MRI 2016 and Dr. Martinez. Discussed all options. Referred to Dr. Fuentes or Dr. Crooks for right shoulder lipoma. Diclofenac PRN. Referral for physical therapy. If no better in 2-3 weeks, will obtain MRI. All options discussed including rest, medicine, home exercise, acupuncture, Chiropractic care, Physical Therapy, Pain management, and last resort surgery. All questions were answered, all alternatives discussed, and the patient is in complete agreement with the treatment plan which the patient contributed to and discussed with me through the shared decision-making process. Follow-up appointment as instructed. Any issues and the patient will call or come in sooner.

## 2024-09-09 NOTE — HISTORY OF PRESENT ILLNESS
[Stable] : stable [de-identified] : 54 year old female presents for evaluation of neck pain x several weeks and lower back pain x1 year. She denies any neck injuries, but notes that the back pain started after a fall down the stairs last year. She states that the pain radiates to the bilateral trapezial region. She also has pain radiating from the lower back down the legs posteriorly to the calves. Denies numbness/tingling. Bending aggravates the pain.  Prior lipoma removal x 2 right shoulder. She went to the ED last week due to the pain and was prescribed flexeril but no relief. Had also tried ibuprofen.  Has not tried PT or chiropractic care. Denies KAT. PMHx: DM, HLD She works as a CNA.  No fever chills sweats nausea vomiting no bowel or bladder dysfunction, no recent weight loss or gain no night pain. This history is in addition to the intake form that I personally reviewed.

## 2024-10-16 ENCOUNTER — RX RENEWAL (OUTPATIENT)
Age: 54
End: 2024-10-16

## 2024-10-21 ENCOUNTER — RX RENEWAL (OUTPATIENT)
Age: 54
End: 2024-10-21

## 2024-11-05 ENCOUNTER — RX RENEWAL (OUTPATIENT)
Age: 54
End: 2024-11-05

## 2024-12-05 ENCOUNTER — RX RENEWAL (OUTPATIENT)
Age: 54
End: 2024-12-05

## 2024-12-13 ENCOUNTER — RX RENEWAL (OUTPATIENT)
Age: 54
End: 2024-12-13

## 2025-01-02 NOTE — ED PROVIDER NOTE - CARDIOVASCULAR NEGATIVE STATEMENT, MLM
Randell Zuluaga  has been [facility] by mother for concerns of  Chief Complaint   Patient presents with    Cold Sores     Since Monday afternoon    Fever     Tactile starting Monday as well    Cough     X1 week       Patient awake, alert, pink, and interactive with staff.  Patient cooperative with triage assessment, Cold sore lesions to inside of cheeks and around lips. No cough heard on assessment, no WOB.  Mother reports pt also has irritated runny nose.    Patient medicated at home with motrin at 0000.      Patient medicated in triage with tylenol per protocol for pain.      Patient taken to yellow 40.  Patient's NPO status until seen and cleared by ERP explained by this RN.  RN made aware that patient is in room.    BP (!) 107/74   Pulse 115   Temp 36.9 °C (98.4 °F) (Temporal)   Resp 24   Wt 31.7 kg (69 lb 14.2 oz)   SpO2 97%       Appropriate PPE was worn during triage.    
no chest pain and no edema.

## 2025-01-08 ENCOUNTER — RX RENEWAL (OUTPATIENT)
Age: 55
End: 2025-01-08

## 2025-01-19 ENCOUNTER — EMERGENCY (EMERGENCY)
Facility: HOSPITAL | Age: 55
LOS: 1 days | Discharge: HOME | End: 2025-01-19
Attending: EMERGENCY MEDICINE | Admitting: EMERGENCY MEDICINE
Payer: MEDICAID

## 2025-01-19 VITALS
RESPIRATION RATE: 18 BRPM | SYSTOLIC BLOOD PRESSURE: 133 MMHG | OXYGEN SATURATION: 100 % | TEMPERATURE: 98 F | HEART RATE: 80 BPM | DIASTOLIC BLOOD PRESSURE: 75 MMHG

## 2025-01-19 VITALS
HEART RATE: 79 BPM | DIASTOLIC BLOOD PRESSURE: 81 MMHG | WEIGHT: 190.04 LBS | OXYGEN SATURATION: 97 % | SYSTOLIC BLOOD PRESSURE: 122 MMHG | RESPIRATION RATE: 17 BRPM | TEMPERATURE: 98 F

## 2025-01-19 DIAGNOSIS — R22.30 LOCALIZED SWELLING, MASS AND LUMP, UNSPECIFIED UPPER LIMB: Chronic | ICD-10-CM

## 2025-01-19 PROCEDURE — 99284 EMERGENCY DEPT VISIT MOD MDM: CPT

## 2025-01-19 RX ORDER — ACETAMINOPHEN 80 MG/.8ML
1000 SOLUTION/ DROPS ORAL ONCE
Refills: 0 | Status: COMPLETED | OUTPATIENT
Start: 2025-01-19 | End: 2025-01-19

## 2025-01-19 RX ORDER — IBUPROFEN 200 MG
600 TABLET ORAL ONCE
Refills: 0 | Status: COMPLETED | OUTPATIENT
Start: 2025-01-19 | End: 2025-01-19

## 2025-01-19 RX ORDER — METOCLOPRAMIDE 10 MG/1
10 TABLET ORAL ONCE
Refills: 0 | Status: COMPLETED | OUTPATIENT
Start: 2025-01-19 | End: 2025-01-19

## 2025-01-19 RX ADMIN — Medication 600 MILLIGRAM(S): at 14:40

## 2025-01-19 RX ADMIN — ACETAMINOPHEN 1000 MILLIGRAM(S): 80 SOLUTION/ DROPS ORAL at 13:26

## 2025-01-19 RX ADMIN — METOCLOPRAMIDE 10 MILLIGRAM(S): 10 TABLET ORAL at 13:26

## 2025-01-19 NOTE — ED PROVIDER NOTE - PROGRESS NOTE DETAILS
Examined patient, ordered Tylenol and reglan Patient has been adamant to nurse that she wants to leave, reassessment she is NAD normal work of breathing

## 2025-01-19 NOTE — ED PROVIDER NOTE - PATIENT PORTAL LINK FT
You can access the FollowMyHealth Patient Portal offered by [facility] by registering at the following website: [email or web address]. By joining [employer]’s FollowMyHealth portal, you will also be able to view your health information using other applications (apps) compatible with our system.

## 2025-01-19 NOTE — ED PROVIDER NOTE - NSFOLLOWUPINSTRUCTIONS_ED_ALL_ED_FT
Viral Respiratory Infection    A viral respiratory infection is an illness that affects parts of the body used for breathing, like the lungs, nose, and throat. It is caused by a germ called a virus. Symptoms can include runny nose, coughing, sneezing, fatigue, body aches, sore throat, fever, or headache. Over the counter medicine can be used to manage the symptoms but the infection typically goes away on its own in 5 to 10 days.     SEEK IMMEDIATE MEDICAL CARE IF YOU HAVE ANY OF THE FOLLOWING SYMPTOMS: shortness of breath, chest pain, fever over 10 days, or lightheadedness/dizziness.     SEE YOUR PRIMARY CARE DOCTOR

## 2025-01-19 NOTE — ED PROVIDER NOTE - CLINICAL SUMMARY MEDICAL DECISION MAKING FREE TEXT BOX
54-year-old female, presents with a two-week history of persistent headache and body aches.  PE: NAD, MMM, lungs CTA, heart no MRG, no abdominal tenderness, no CVA tenderness, no LE edema   ddx: Viral URI, physiological headache, migraine  Plan: Reglan Tylenol 54-year-old female, presents with a two-week history of persistent headache and body aches.  PE: NAD, MMM, lungs CTA, heart no MRG, no abdominal tenderness, no CVA tenderness, no LE edema   ddx: Viral URI, physiological headache, migraine  Plan: Reglan Tylenol    Henry: Patient is a 54-year-old who presents to the emergency department with a week plus of headache and bodyaches.  Patient with some nausea and no cough.  Patient with benign exam nonfocal.  Would treat as viral illness will get basic labs to ensure no renal failure.

## 2025-01-19 NOTE — ED PROVIDER NOTE - ATTENDING CONTRIBUTION TO CARE
I performed a history and physical exam of the patient and discussed their management with the resident and /or advanced care provider. I reviewed the resident and /or ACP's note and agree with the documented findings and plan of care. My medical decision making and observations are found above.  nl neuro exam, lungs clear, abd soft

## 2025-01-19 NOTE — ED ADULT TRIAGE NOTE - CHIEF COMPLAINT QUOTE
pt c/o headache and body aches x 1 week. went to PMD and had a viral panel done yesterday, does not have the result.

## 2025-01-19 NOTE — ED PROVIDER NOTE - OBJECTIVE STATEMENT
Ms. Bourgeois, a 54-year-old female, states that she has been experiencing headaches for the past two weeks. The headaches are getting worse and there is an associated sensation of body aches and throat discomfort. The discomfort seems not to respond to the use of Tylenol or Motrin. She has not noticed any symptoms of nausea, vomiting, or fever but has chills. There is no evidence of urinary tract infections such as burning sensations during urination.  - Past Medical History : The patient has a medical history of fibroids, DM, HLD, recurrent Lipoma   - Past Surgical History : The patient underwent a shoulder surgery in 2000 to remove a Lipoma which seems to be reoccurring now.  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

## 2025-01-19 NOTE — ED PROVIDER NOTE - PHYSICAL EXAMINATION
Vital signs reviewed.  CONSTITUTIONAL: breathing, NAD   HEAD: Normocephalic; atraumatic  EYES: EOMI, PERRL, no conjunctival injection, no scleral icterus  MOUTH/THROAT:  MMM  NECK: Trachea midline, no JVD  CV: Normal S1, S2; no audible murmurs  RESP: normal work of breathing, breath sounds b/l  ABD: soft, non-distended; non-tender to palpation   : Deferred  MSK/EXT: no LE edema, no limited ROM  SKIN: No rashes on exposed skin surfaces  NEURO: Moves all extremities spontaneously with no focal deficits, speech is appropriate  PSYCH: calm

## 2025-01-19 NOTE — ED ADULT NURSE NOTE - OBJECTIVE STATEMENT
pt. received to int. with c/o headaches x2 weeks a/w intermittent blurry vision and body aches. NAD noted at this time, well appearing, respirations even and unlabored on RA. 22g IV placed in the R AC. no labs drawn. due medications given. comfort measures provided. safety precautions maintained.

## 2025-02-26 ENCOUNTER — EMERGENCY (EMERGENCY)
Facility: HOSPITAL | Age: 55
LOS: 1 days | Discharge: HOME | End: 2025-02-26
Attending: STUDENT IN AN ORGANIZED HEALTH CARE EDUCATION/TRAINING PROGRAM | Admitting: STUDENT IN AN ORGANIZED HEALTH CARE EDUCATION/TRAINING PROGRAM
Payer: MEDICAID

## 2025-02-26 VITALS
DIASTOLIC BLOOD PRESSURE: 72 MMHG | SYSTOLIC BLOOD PRESSURE: 113 MMHG | OXYGEN SATURATION: 99 % | HEIGHT: 64 IN | HEART RATE: 93 BPM | WEIGHT: 186.07 LBS | RESPIRATION RATE: 982 BRPM | TEMPERATURE: 98 F

## 2025-02-26 DIAGNOSIS — R22.30 LOCALIZED SWELLING, MASS AND LUMP, UNSPECIFIED UPPER LIMB: Chronic | ICD-10-CM

## 2025-02-26 LAB
ADD ON TEST-SPECIMEN IN LAB: SIGNIFICANT CHANGE UP
ALBUMIN SERPL ELPH-MCNC: 4.4 G/DL — SIGNIFICANT CHANGE UP (ref 3.3–5)
ALP SERPL-CCNC: 80 U/L — SIGNIFICANT CHANGE UP (ref 40–120)
ALT FLD-CCNC: 19 U/L — SIGNIFICANT CHANGE UP (ref 4–33)
ANION GAP SERPL CALC-SCNC: 11 MMOL/L — SIGNIFICANT CHANGE UP (ref 7–14)
APPEARANCE UR: CLEAR — SIGNIFICANT CHANGE UP
AST SERPL-CCNC: 23 U/L — SIGNIFICANT CHANGE UP (ref 4–32)
BASOPHILS # BLD AUTO: 0.06 K/UL — SIGNIFICANT CHANGE UP (ref 0–0.2)
BASOPHILS NFR BLD AUTO: 0.7 % — SIGNIFICANT CHANGE UP (ref 0–2)
BILIRUB SERPL-MCNC: <0.2 MG/DL — SIGNIFICANT CHANGE UP (ref 0.2–1.2)
BILIRUB UR-MCNC: NEGATIVE — SIGNIFICANT CHANGE UP
BUN SERPL-MCNC: 13 MG/DL — SIGNIFICANT CHANGE UP (ref 7–23)
CALCIUM SERPL-MCNC: 9.4 MG/DL — SIGNIFICANT CHANGE UP (ref 8.4–10.5)
CHLORIDE SERPL-SCNC: 101 MMOL/L — SIGNIFICANT CHANGE UP (ref 98–107)
CO2 SERPL-SCNC: 25 MMOL/L — SIGNIFICANT CHANGE UP (ref 22–31)
COLOR SPEC: YELLOW — SIGNIFICANT CHANGE UP
CREAT SERPL-MCNC: 0.9 MG/DL — SIGNIFICANT CHANGE UP (ref 0.5–1.3)
DIFF PNL FLD: NEGATIVE — SIGNIFICANT CHANGE UP
EGFR: 75 ML/MIN/1.73M2 — SIGNIFICANT CHANGE UP
EGFR: 75 ML/MIN/1.73M2 — SIGNIFICANT CHANGE UP
EOSINOPHIL # BLD AUTO: 0.42 K/UL — SIGNIFICANT CHANGE UP (ref 0–0.5)
EOSINOPHIL NFR BLD AUTO: 5 % — SIGNIFICANT CHANGE UP (ref 0–6)
FLUAV AG NPH QL: SIGNIFICANT CHANGE UP
FLUBV AG NPH QL: SIGNIFICANT CHANGE UP
GLUCOSE SERPL-MCNC: 188 MG/DL — HIGH (ref 70–99)
GLUCOSE UR QL: 250 MG/DL
HCT VFR BLD CALC: 39.8 % — SIGNIFICANT CHANGE UP (ref 34.5–45)
HGB BLD-MCNC: 13.5 G/DL — SIGNIFICANT CHANGE UP (ref 11.5–15.5)
IANC: 3.28 K/UL — SIGNIFICANT CHANGE UP (ref 1.8–7.4)
IMM GRANULOCYTES NFR BLD AUTO: 0.1 % — SIGNIFICANT CHANGE UP (ref 0–0.9)
KETONES UR-MCNC: NEGATIVE MG/DL — SIGNIFICANT CHANGE UP
LEUKOCYTE ESTERASE UR-ACNC: ABNORMAL
LYMPHOCYTES # BLD AUTO: 4.11 K/UL — HIGH (ref 1–3.3)
LYMPHOCYTES # BLD AUTO: 48.6 % — HIGH (ref 13–44)
MCHC RBC-ENTMCNC: 31.5 PG — SIGNIFICANT CHANGE UP (ref 27–34)
MCHC RBC-ENTMCNC: 33.9 G/DL — SIGNIFICANT CHANGE UP (ref 32–36)
MCV RBC AUTO: 93 FL — SIGNIFICANT CHANGE UP (ref 80–100)
MONOCYTES # BLD AUTO: 0.57 K/UL — SIGNIFICANT CHANGE UP (ref 0–0.9)
MONOCYTES NFR BLD AUTO: 6.7 % — SIGNIFICANT CHANGE UP (ref 2–14)
NEUTROPHILS # BLD AUTO: 3.28 K/UL — SIGNIFICANT CHANGE UP (ref 1.8–7.4)
NEUTROPHILS NFR BLD AUTO: 38.9 % — LOW (ref 43–77)
NITRITE UR-MCNC: NEGATIVE — SIGNIFICANT CHANGE UP
NRBC # BLD AUTO: 0 K/UL — SIGNIFICANT CHANGE UP (ref 0–0)
NRBC # FLD: 0 K/UL — SIGNIFICANT CHANGE UP (ref 0–0)
NRBC BLD AUTO-RTO: 0 /100 WBCS — SIGNIFICANT CHANGE UP (ref 0–0)
PH UR: 6 — SIGNIFICANT CHANGE UP (ref 5–8)
PLATELET # BLD AUTO: 258 K/UL — SIGNIFICANT CHANGE UP (ref 150–400)
POTASSIUM SERPL-MCNC: 4.2 MMOL/L — SIGNIFICANT CHANGE UP (ref 3.5–5.3)
POTASSIUM SERPL-SCNC: 4.2 MMOL/L — SIGNIFICANT CHANGE UP (ref 3.5–5.3)
PROT SERPL-MCNC: 7.7 G/DL — SIGNIFICANT CHANGE UP (ref 6–8.3)
PROT UR-MCNC: NEGATIVE MG/DL — SIGNIFICANT CHANGE UP
RBC # BLD: 4.28 M/UL — SIGNIFICANT CHANGE UP (ref 3.8–5.2)
RBC # FLD: 12.1 % — SIGNIFICANT CHANGE UP (ref 10.3–14.5)
RSV RNA NPH QL NAA+NON-PROBE: SIGNIFICANT CHANGE UP
SARS-COV-2 RNA SPEC QL NAA+PROBE: SIGNIFICANT CHANGE UP
SODIUM SERPL-SCNC: 137 MMOL/L — SIGNIFICANT CHANGE UP (ref 135–145)
SP GR SPEC: 1.02 — SIGNIFICANT CHANGE UP (ref 1–1.03)
TSH SERPL-MCNC: 2.14 UIU/ML — SIGNIFICANT CHANGE UP (ref 0.27–4.2)
UROBILINOGEN FLD QL: 0.2 MG/DL — SIGNIFICANT CHANGE UP (ref 0.2–1)
WBC # BLD: 8.45 K/UL — SIGNIFICANT CHANGE UP (ref 3.8–10.5)
WBC # FLD AUTO: 8.45 K/UL — SIGNIFICANT CHANGE UP (ref 3.8–10.5)

## 2025-02-26 PROCEDURE — 99285 EMERGENCY DEPT VISIT HI MDM: CPT

## 2025-02-26 PROCEDURE — 93010 ELECTROCARDIOGRAM REPORT: CPT

## 2025-02-26 PROCEDURE — 71046 X-RAY EXAM CHEST 2 VIEWS: CPT | Mod: 26

## 2025-02-26 PROCEDURE — 70450 CT HEAD/BRAIN W/O DYE: CPT | Mod: 26

## 2025-02-26 RX ORDER — METOCLOPRAMIDE HCL 10 MG
10 TABLET ORAL ONCE
Refills: 0 | Status: COMPLETED | OUTPATIENT
Start: 2025-02-26 | End: 2025-02-26

## 2025-02-26 RX ORDER — ACETAMINOPHEN 500 MG/5ML
1000 LIQUID (ML) ORAL ONCE
Refills: 0 | Status: COMPLETED | OUTPATIENT
Start: 2025-02-26 | End: 2025-02-26

## 2025-02-26 RX ADMIN — Medication 400 MILLIGRAM(S): at 21:50

## 2025-02-26 RX ADMIN — Medication 1000 MILLILITER(S): at 21:50

## 2025-02-26 RX ADMIN — Medication 10 MILLIGRAM(S): at 21:50

## 2025-02-27 VITALS
SYSTOLIC BLOOD PRESSURE: 96 MMHG | OXYGEN SATURATION: 100 % | DIASTOLIC BLOOD PRESSURE: 67 MMHG | HEART RATE: 82 BPM | RESPIRATION RATE: 20 BRPM | TEMPERATURE: 98 F

## 2025-02-27 RX ORDER — KETOROLAC TROMETHAMINE 30 MG/ML
15 INJECTION, SOLUTION INTRAMUSCULAR; INTRAVENOUS ONCE
Refills: 0 | Status: DISCONTINUED | OUTPATIENT
Start: 2025-02-27 | End: 2025-02-27

## 2025-02-27 RX ADMIN — KETOROLAC TROMETHAMINE 15 MILLIGRAM(S): 30 INJECTION, SOLUTION INTRAMUSCULAR; INTRAVENOUS at 01:19

## 2025-02-27 NOTE — ED ADULT NURSE NOTE - NSFALLRSKINDICATORS_ED_ALL_ED
[facility] Surgical Specialist  [phone]  no

## 2025-02-28 LAB
CULTURE RESULTS: SIGNIFICANT CHANGE UP
SPECIMEN SOURCE: SIGNIFICANT CHANGE UP

## 2025-03-10 NOTE — ED CDU PROVIDER SUBSEQUENT DAY NOTE - CONDUCTED A DETAILED DISCUSSION WITH PATIENT AND/OR GUARDIAN REGARDING, MDM
PC from pt, stated when he was in on 2/21/25 and during the visit got a nebulizer treatment.    Pt went out and purchased a nebulizer but is needing a Rx for the medication that goes in the nebulizer.     Would like Rx to go to [facility].    Pt also is asking if provider can recommend a good chiropractor    Please advise.    lab results/radiology results

## 2025-03-14 NOTE — ED PROVIDER NOTE - CONSTITUTIONAL, MLM
Patient connected to monitors and defibrillator  normal... Well appearing, well nourished, awake, alert, oriented to person, place, time/situation and in no apparent distress.

## 2025-04-02 NOTE — CONSULT NOTE ADULT - ATTENDING COMMENTS
no
Patient examined, chart reviewed and case was presented on rounds. Agree with above Hx.  On exam patient has 4/5 weakness in the left finger flexors and triceps. There are no long tract signs. The rest of the exam is non-focal. There is restricted range of motion at the left shoulder due to pain.  These findings are consistent with a left C6-7 radiculopathy.  Awaiting MRI head for W/U of headaches and pulsatile tinnitus. If negative can be worked up as outpatient.

## 2025-04-17 ENCOUNTER — EMERGENCY (EMERGENCY)
Facility: HOSPITAL | Age: 55
LOS: 1 days | End: 2025-04-17
Admitting: EMERGENCY MEDICINE
Payer: MEDICAID

## 2025-04-17 VITALS
TEMPERATURE: 98 F | DIASTOLIC BLOOD PRESSURE: 70 MMHG | SYSTOLIC BLOOD PRESSURE: 107 MMHG | HEART RATE: 78 BPM | RESPIRATION RATE: 18 BRPM | OXYGEN SATURATION: 98 %

## 2025-04-17 VITALS
RESPIRATION RATE: 18 BRPM | TEMPERATURE: 98 F | WEIGHT: 181 LBS | OXYGEN SATURATION: 97 % | DIASTOLIC BLOOD PRESSURE: 70 MMHG | HEART RATE: 100 BPM | HEIGHT: 64 IN | SYSTOLIC BLOOD PRESSURE: 107 MMHG

## 2025-04-17 DIAGNOSIS — R22.30 LOCALIZED SWELLING, MASS AND LUMP, UNSPECIFIED UPPER LIMB: Chronic | ICD-10-CM

## 2025-04-17 LAB
APPEARANCE UR: CLEAR — SIGNIFICANT CHANGE UP
BILIRUB UR-MCNC: NEGATIVE — SIGNIFICANT CHANGE UP
COLOR SPEC: YELLOW — SIGNIFICANT CHANGE UP
DIFF PNL FLD: NEGATIVE — SIGNIFICANT CHANGE UP
GLUCOSE UR QL: >=1000 MG/DL
KETONES UR-MCNC: NEGATIVE MG/DL — SIGNIFICANT CHANGE UP
LEUKOCYTE ESTERASE UR-ACNC: NEGATIVE — SIGNIFICANT CHANGE UP
NITRITE UR-MCNC: NEGATIVE — SIGNIFICANT CHANGE UP
PH UR: 6 — SIGNIFICANT CHANGE UP (ref 5–8)
PROT UR-MCNC: NEGATIVE MG/DL — SIGNIFICANT CHANGE UP
SP GR SPEC: 1.03 — HIGH (ref 1–1.03)
UROBILINOGEN FLD QL: 0.2 MG/DL — SIGNIFICANT CHANGE UP (ref 0.2–1)

## 2025-04-17 PROCEDURE — 99284 EMERGENCY DEPT VISIT MOD MDM: CPT

## 2025-04-17 PROCEDURE — 76830 TRANSVAGINAL US NON-OB: CPT | Mod: 26

## 2025-04-17 RX ORDER — KETOROLAC TROMETHAMINE 30 MG/ML
30 INJECTION, SOLUTION INTRAMUSCULAR; INTRAVENOUS ONCE
Refills: 0 | Status: DISCONTINUED | OUTPATIENT
Start: 2025-04-17 | End: 2025-04-17

## 2025-04-17 RX ORDER — OXYCODONE HYDROCHLORIDE AND ACETAMINOPHEN 10; 325 MG/1; MG/1
1 TABLET ORAL
Qty: 9 | Refills: 0
Start: 2025-04-17

## 2025-04-17 RX ORDER — IBUPROFEN 200 MG
1 TABLET ORAL
Qty: 18 | Refills: 0
Start: 2025-04-17

## 2025-04-17 RX ADMIN — KETOROLAC TROMETHAMINE 30 MILLIGRAM(S): 30 INJECTION, SOLUTION INTRAMUSCULAR; INTRAVENOUS at 20:00

## 2025-04-17 NOTE — ED PROVIDER NOTE - CLINICAL SUMMARY MEDICAL DECISION MAKING FREE TEXT BOX
patient is a 55-year-old female, history of diabetes, hyperlipidemia, uterine fibroids presenting to the ED with a complaint of intermittent lower pelvic pain x 3 weeks. On presentation patient well-appearing, vital stable, on exam, suprapubic tenderness.  Symptoms possibly due to UTI versus pain secondary to uterine fibroids.   Breast exam unremarkable. plan for UA, TVUS Patient will be advised to follow-up with outpatient provider.

## 2025-04-17 NOTE — ED ADULT NURSE NOTE - NS_SISCREENINGSR_GEN_ALL_ED
Assumed care at [address]. Pt is A&Ox 2, very confused and forgetful at times. On Ra, . NSR on tele, St at times. Receives lactulose. Abdomen distended due to gross ascites.    Paracentesis ordered however INR levels are increased, tbd when paracent and activity tolerance for standing, transferring and ambulating w/ or w/o assistive devices  - Assist with transfers and ambulation using safe patient handling equipment as needed  - Ensure adequate protection for wounds/incisions during mobilization  - O Negative

## 2025-04-17 NOTE — ED ADULT NURSE NOTE - NS ED NOTE ABUSE RESPONSE YN
Airway patent, normal appearing mouth, nose, throat, neck supple with full range of motion, no cervical adenopathy. Yes

## 2025-04-17 NOTE — ED PROVIDER NOTE - NSFOLLOWUPINSTRUCTIONS_ED_ALL_ED_FT
PELVIC PAIN IN WOMEN - Ambulatory Care    Pelvic Pain in Women    AMBULATORY CARE:    Pelvic pain may happen on one or both sides of your pelvis. Pelvic pain may occur with certain body positions or activities, such as when you have sex or a bowel movement. It may worsen during your monthly period or after you sit or stand for a long time. Chronic pelvic pain is pain that continues for longer than 6 months.    Call 911 for any of the following:    You have severe chest pain and sudden trouble breathing.    Seek care immediately if:    You have heavy or unusual vaginal bleeding, and you feel lightheaded or faint.    Contact your healthcare provider if:    You have pelvic pain that does not go away after you take pain medicine.    You develop new symptoms or your symptoms are worse than before.    You have questions or concerns about your condition or care.  Medicines: You may need any of the following:    Pain medicine may be given in pills or creams to relieve your pain.    Hormones may be given if your pain gets worse with your menstrual cycle.    Antibiotics may be given if your pain is caused by infection.    Take your medicine as directed. Contact your healthcare provider if you think your medicine is not helping or if you have side effects. Tell your provider if you are allergic to any medicine. Keep a list of the medicines, vitamins, and herbs you take. Include the amounts, and when and why you take them. Bring the list or the pill bottles to follow-up visits. Carry your medicine list with you in case of an emergency.  Self-care:    Keep a pain diary. Write down when your pain happens, how severe it is, and any other symptoms you have with your pain. A diary will help you keep track of pain cycles. It may also help your healthcare provider find out what is causing your pain.    Learn ways to relax. Deep breathing, meditation, and relaxation techniques can help decrease your pain. When you are tense, your pain may increase.    Change the foods you eat if you have irritable bowel syndrome. Ask your healthcare provider about the best foods for you.  Follow up with your doctor as directed: Write down your questions so you remember to ask them during your visits.

## 2025-04-17 NOTE — ED PROVIDER NOTE - PATIENT PORTAL LINK FT
You can access the FollowMyHealth Patient Portal offered by [facility] by registering at the following website: [email or web address]. By joining [email or web address]’s FollowMyHealth portal, you will also be able to view your health information using other applications (apps) compatible with our system.

## 2025-04-17 NOTE — ED PROVIDER NOTE - SKIN, MLM
breast: tenderness to upper chest wall. breast exam unremarkable. Skin normal color for race, warm, dry and intact. No evidence of rash.

## 2025-04-17 NOTE — ED PROVIDER NOTE - OBJECTIVE STATEMENT
patient is a 55-year-old female, history of diabetes, hyperlipidemia, uterine fibroids presenting to the ED with a complaint of intermittent lower pelvic pain x 3 weeks.  Pain is a pressure sensation felt during urination however there is no dysuria, vaginal discharge lower abdominal pain, fever, chills.  Patient states that she was treated by her GYN with Levaquin for possible UTI however symptoms have continued to persist.  Patient also reporting bilateral breast pain without skin changes, masses, skin changes, nipple retraction.

## 2025-04-17 NOTE — ED ADULT NURSE NOTE - OBJECTIVE STATEMENT
Pt A&OX4. Ambulatory independently. Hx of HTN & HDL. Pt c/o of bilateral breast pain and pelvic pain x 1 week. Pt stated that her breast are swollen and painful to touch. Pt stated that her nipples are sensitive. denies hematuria or any urinary symptoms. Last menstrual period was a year ago. Pt endorsing hot flashes that are uncomfortable. Denies dizziness, headache, blurred vision, N/V/D. Heart sounds S1 and S2 heard upon auscultation. +2 pulses palpated in all extremities. Capillary refill less than 3 seconds. Breathing even and unlabored. Clear breath sounds. Abdomen soft, non tender, non distended. Normoactive bowel sounds heard in all four quadrants. Medicated per eMAR. Safety and comfort measures in place.

## 2025-05-02 ENCOUNTER — NON-APPOINTMENT (OUTPATIENT)
Age: 55
End: 2025-05-02

## 2025-05-02 ENCOUNTER — OUTPATIENT (OUTPATIENT)
Dept: OUTPATIENT SERVICES | Facility: HOSPITAL | Age: 55
LOS: 1 days | End: 2025-05-02

## 2025-05-02 ENCOUNTER — APPOINTMENT (OUTPATIENT)
Dept: DERMATOLOGY | Facility: CLINIC | Age: 55
End: 2025-05-02

## 2025-05-02 VITALS
WEIGHT: 190 LBS | BODY MASS INDEX: 30.53 KG/M2 | OXYGEN SATURATION: 98 % | RESPIRATION RATE: 16 BRPM | HEIGHT: 66 IN | SYSTOLIC BLOOD PRESSURE: 104 MMHG | TEMPERATURE: 97.5 F | HEART RATE: 75 BPM | DIASTOLIC BLOOD PRESSURE: 71 MMHG

## 2025-05-02 DIAGNOSIS — R22.30 LOCALIZED SWELLING, MASS AND LUMP, UNSPECIFIED UPPER LIMB: Chronic | ICD-10-CM

## 2025-05-02 DIAGNOSIS — L70.0 ACNE VULGARIS: ICD-10-CM

## 2025-05-02 DIAGNOSIS — L81.0 POSTINFLAMMATORY HYPERPIGMENTATION: ICD-10-CM

## 2025-05-02 DIAGNOSIS — D17.21 BENIGN LIPOMATOUS NEOPLASM OF SKIN AND SUBCUTANEOUS TISSUE OF RIGHT ARM: ICD-10-CM

## 2025-05-02 PROCEDURE — 99203 OFFICE O/P NEW LOW 30 MIN: CPT

## 2025-05-02 RX ORDER — TRETINOIN 0.25 MG/G
0.03 CREAM TOPICAL
Qty: 1 | Refills: 4 | Status: ACTIVE | COMMUNITY
Start: 2025-05-02 | End: 1900-01-01

## 2025-05-02 RX ORDER — CLINDAMYCIN PHOSPHATE 10 MG/ML
1 LOTION TOPICAL
Qty: 1 | Refills: 4 | Status: ACTIVE | COMMUNITY
Start: 2025-05-02 | End: 1900-01-01

## 2025-05-02 RX ORDER — DOXYCYCLINE HYCLATE 100 MG/1
100 TABLET ORAL
Qty: 120 | Refills: 0 | Status: ACTIVE | COMMUNITY
Start: 2025-05-02 | End: 1900-01-01

## 2025-05-02 RX ORDER — BENZOYL PEROXIDE 5 G/100G
5 LIQUID TOPICAL
Qty: 1 | Refills: 7 | Status: ACTIVE | COMMUNITY
Start: 2025-05-02 | End: 1900-01-01

## 2025-05-06 ENCOUNTER — NON-APPOINTMENT (OUTPATIENT)
Age: 55
End: 2025-05-06

## 2025-05-13 ENCOUNTER — APPOINTMENT (OUTPATIENT)
Dept: OBGYN | Facility: HOSPITAL | Age: 55
End: 2025-05-13
Payer: MEDICAID

## 2025-05-13 ENCOUNTER — OUTPATIENT (OUTPATIENT)
Dept: OUTPATIENT SERVICES | Facility: HOSPITAL | Age: 55
LOS: 1 days | End: 2025-05-13

## 2025-05-13 VITALS
SYSTOLIC BLOOD PRESSURE: 115 MMHG | BODY MASS INDEX: 31.58 KG/M2 | WEIGHT: 185 LBS | TEMPERATURE: 97.9 F | HEART RATE: 78 BPM | DIASTOLIC BLOOD PRESSURE: 81 MMHG | HEIGHT: 64 IN

## 2025-05-13 DIAGNOSIS — N64.4 MASTODYNIA: ICD-10-CM

## 2025-05-13 DIAGNOSIS — R22.30 LOCALIZED SWELLING, MASS AND LUMP, UNSPECIFIED UPPER LIMB: Chronic | ICD-10-CM

## 2025-05-13 PROCEDURE — 99213 OFFICE O/P EST LOW 20 MIN: CPT

## 2025-05-14 DIAGNOSIS — N64.4 MASTODYNIA: ICD-10-CM

## 2025-05-27 NOTE — ED CDU PROVIDER INITIAL DAY NOTE - CPE EDP CARDIAC NORM
normal... Include Pregnancy/Lactation Warning?: No Eucrisa Pregnancy And Lactation Text: This medication has not been assigned a Pregnancy Risk Category but animal studies failed to show danger with the topical medication. It is unknown if the medication is excreted in breast milk. Niacinamide Counseling: I recommended taking niacin or niacinamide, also know as vitamin B3, twice daily. Recent evidence suggests that taking vitamin B3 (500 mg twice daily) can reduce the risk of actinic keratoses and non-melanoma skin cancers. Side effects of vitamin B3 include flushing and headache. Tazorac Pregnancy And Lactation Text: This medication is not safe during pregnancy. It is unknown if this medication is excreted in breast milk. Topical Sulfur Applications Pregnancy And Lactation Text: This medication is Pregnancy Category C and has an unknown safety profile during pregnancy. It is unknown if this topical medication is excreted in breast milk. Tremfya Pregnancy And Lactation Text: The risk during pregnancy and breastfeeding is uncertain with this medication. Metronidazole Pregnancy And Lactation Text: This medication is Pregnancy Category B and considered safe during pregnancy.  It is also excreted in breast milk. Mirvaso Counseling: Mirvaso is a topical medication which can decrease superficial blood flow where applied. Side effects are uncommon and include stinging, redness and allergic reactions. Acitretin Pregnancy And Lactation Text: This medication is Pregnancy Category X and should not be given to women who are pregnant or may become pregnant in the future. This medication is excreted in breast milk. Otezla Pregnancy And Lactation Text: This medication is Pregnancy Category C and it isn't known if it is safe during pregnancy. It is unknown if it is excreted in breast milk. Dutasteride Male Counseling: Dutasteride Counseling:  I discussed with the patient the risks of use of dutasteride including but not limited to decreased libido, decreased ejaculate volume, and gynecomastia. Women who can become pregnant should not handle medication.  All of the patient's questions and concerns were addressed. Aklief counseling:  Patient advised to apply a pea-sized amount only at bedtime and wait 30 minutes after washing their face before applying.  If too drying, patient may add a non-comedogenic moisturizer.  The most commonly reported side effects including irritation, redness, scaling, dryness, stinging, burning, itching, and increased risk of sunburn.  The patient verbalized understanding of the proper use and possible adverse effects of retinoids.  All of the patient's questions and concerns were addressed. Minocycline Counseling: Patient advised regarding possible photosensitivity and discoloration of the teeth, skin, lips, tongue and gums.  Patient instructed to avoid sunlight, if possible.  When exposed to sunlight, patients should wear protective clothing, sunglasses, and sunscreen.  The patient was instructed to call the office immediately if the following severe adverse effects occur:  hearing changes, easy bruising/bleeding, severe headache, or vision changes.  The patient verbalized understanding of the proper use and possible adverse effects of minocycline.  All of the patient's questions and concerns were addressed. Mirvaso Pregnancy And Lactation Text: This medication has not been assigned a Pregnancy Risk Category. It is unknown if the medication is excreted in breast milk. Bexarotene Counseling:  I discussed with the patient the risks of bexarotene including but not limited to hair loss, dry lips/skin/eyes, liver abnormalities, hyperlipidemia, pancreatitis, depression/suicidal ideation, photosensitivity, drug rash/allergic reactions, hypothyroidism, anemia, leukopenia, infection, cataracts, and teratogenicity.  Patient understands that they will need regular blood tests to check lipid profile, liver function tests, white blood cell count, thyroid function tests and pregnancy test if applicable. Oxybutynin Counseling:  I discussed with the patient the risks of oxybutynin including but not limited to skin rash, drowsiness, dry mouth, difficulty urinating, and blurred vision. Olumiant Pregnancy And Lactation Text: Based on animal studies, Olumiant may cause embryo-fetal harm when administered to pregnant women.  The medication should not be used in pregnancy.  Breastfeeding is not recommended during treatment. Hyrimoz Counseling:  I discussed with the patient the risks of adalimumab including but not limited to myelosuppression, immunosuppression, autoimmune hepatitis, demyelinating diseases, lymphoma, and serious infections.  The patient understands that monitoring is required including a PPD at baseline and must alert us or the primary physician if symptoms of infection or other concerning signs are noted. Terbinafine Counseling: Patient counseling regarding adverse effects of terbinafine including but not limited to headache, diarrhea, rash, upset stomach, liver function test abnormalities, itching, taste/smell disturbance, nausea, abdominal pain, and flatulence.  There is a rare possibility of liver failure that can occur when taking terbinafine.  The patient understands that a baseline LFT and kidney function test may be required. The patient verbalized understanding of the proper use and possible adverse effects of terbinafine.  All of the patient's questions and concerns were addressed. Opioid Counseling: I discussed with the patient the potential side effects of opioids including but not limited to addiction, altered mental status, and depression. I stressed avoiding alcohol, benzodiazepines, muscle relaxants and sleep aids unless specifically okayed by a physician. The patient verbalized understanding of the proper use and possible adverse effects of opioids. All of the patient's questions and concerns were addressed. They were instructed to flush the remaining pills down the toilet if they did not need them for pain. Tetracycline Pregnancy And Lactation Text: This medication is Pregnancy Category D and not consider safe during pregnancy. It is also excreted in breast milk. Dapsone Pregnancy And Lactation Text: This medication is Pregnancy Category C and is not considered safe during pregnancy or breast feeding. Calcipotriene Pregnancy And Lactation Text: The use of this medication during pregnancy or lactation is not recommended as there is insufficient data. Tranexamic Acid Pregnancy And Lactation Text: It is unknown if this medication is safe during pregnancy or breast feeding. Spironolactone Pregnancy And Lactation Text: This medication can cause feminization of the male fetus and should be avoided during pregnancy. The active metabolite is also found in breast milk. Rhofade Counseling: Rhofade is a topical medication which can decrease superficial blood flow where applied. Side effects are uncommon and include stinging, redness and allergic reactions. Cimzia Pregnancy And Lactation Text: This medication crosses the placenta but can be considered safe in certain situations. Cimzia may be excreted in breast milk. Methotrexate Counseling:  Patient counseled regarding adverse effects of methotrexate including but not limited to nausea, vomiting, abnormalities in liver function tests. Patients may develop mouth sores, rash, diarrhea, and abnormalities in blood counts. The patient understands that monitoring is required including LFT's and blood counts.  There is a rare possibility of scarring of the liver and lung problems that can occur when taking methotrexate. Persistent nausea, loss of appetite, pale stools, dark urine, cough, and shortness of breath should be reported immediately. Patient advised to discontinue methotrexate treatment at least three months before attempting to become pregnant.  I discussed the need for folate supplements while taking methotrexate.  These supplements can decrease side effects during methotrexate treatment. The patient verbalized understanding of the proper use and possible adverse effects of methotrexate.  All of the patient's questions and concerns were addressed. Cephalexin Counseling: I counseled the patient regarding use of cephalexin as an antibiotic for prophylactic and/or therapeutic purposes. Cephalexin (commonly prescribed under brand name Keflex) is a cephalosporin antibiotic which is active against numerous classes of bacteria, including most skin bacteria. Side effects may include nausea, diarrhea, gastrointestinal upset, rash, hives, yeast infections, and in rare cases, hepatitis, kidney disease, seizures, fever, confusion, neurologic symptoms, and others. Patients with severe allergies to penicillin medications are cautioned that there is about a 10% incidence of cross-reactivity with cephalosporins. When possible, patients with penicillin allergies should use alternatives to cephalosporins for antibiotic therapy. Albendazole Counseling:  I discussed with the patient the risks of albendazole including but not limited to cytopenia, kidney damage, nausea/vomiting and severe allergy.  The patient understands that this medication is being used in an off-label manner. Skyrizi Counseling: I discussed with the patient the risks of risankizumab-rzaa including but not limited to immunosuppression, and serious infections.  The patient understands that monitoring is required including a PPD at baseline and must alert us or the primary physician if symptoms of infection or other concerning signs are noted. Methotrexate Pregnancy And Lactation Text: This medication is Pregnancy Category X and is known to cause fetal harm. This medication is excreted in breast milk. Cantharidin Counseling:  I discussed with the patient the risks of Cantharidin including but not limited to pain, redness, burning, itching, and blistering. Cosentyx Counseling:  I discussed with the patient the risks of Cosentyx including but not limited to worsening of Crohn's disease, immunosuppression, allergic reactions and infections.  The patient understands that monitoring is required including a PPD at baseline and must alert us or the primary physician if symptoms of infection or other concerning signs are noted. Opioid Pregnancy And Lactation Text: These medications can lead to premature delivery and should be avoided during pregnancy. These medications are also present in breast milk in small amounts. Cephalexin Pregnancy And Lactation Text: This medication is Pregnancy Category B and considered safe during pregnancy.  It is also excreted in breast milk but can be used safely for shorter doses. Albendazole Pregnancy And Lactation Text: This medication is Pregnancy Category C and it isn't known if it is safe during pregnancy. It is also excreted in breast milk. Hydroquinone Counseling:  Patient advised that medication may result in skin irritation, lightening (hypopigmentation), dryness, and burning.  In the event of skin irritation, the patient was advised to reduce the amount of the drug applied or use it less frequently.  Rarely, spots that are treated with hydroquinone can become darker (pseudoochronosis).  Should this occur, patient instructed to stop medication and call the office. The patient verbalized understanding of the proper use and possible adverse effects of hydroquinone.  All of the patient's questions and concerns were addressed. Wartpeel Counseling:  I discussed with the patient the risks of Wartpeel including but not limited to erythema, scaling, itching, weeping, crusting, and pain. Niacinamide Pregnancy And Lactation Text: These medications are considered safe during pregnancy. Xolair Counseling:  Patient informed of potential adverse effects including but not limited to fever, muscle aches, rash and allergic reactions.  The patient verbalized understanding of the proper use and possible adverse effects of Xolair.  All of the patient's questions and concerns were addressed. Ilumya Counseling: I discussed with the patient the risks of tildrakizumab including but not limited to immunosuppression, malignancy, posterior leukoencephalopathy syndrome, and serious infections.  The patient understands that monitoring is required including a PPD at baseline and must alert us or the primary physician if symptoms of infection or other concerning signs are noted. Topical Clindamycin Pregnancy And Lactation Text: This medication is Pregnancy Category B and is considered safe during pregnancy. It is unknown if it is excreted in breast milk. Xolair Pregnancy And Lactation Text: This medication is Pregnancy Category B and is considered safe during pregnancy. This medication is excreted in breast milk. Hyrimoz Pregnancy And Lactation Text: This medication is Pregnancy Category B and is considered safe during pregnancy. It is unknown if this medication is excreted in breast milk. Topical Clindamycin Counseling: Patient counseled that this medication may cause skin irritation or allergic reactions.  In the event of skin irritation, the patient was advised to reduce the amount of the drug applied or use it less frequently.   The patient verbalized understanding of the proper use and possible adverse effects of clindamycin.  All of the patient's questions and concerns were addressed. Fluconazole Counseling:  Patient counseled regarding adverse effects of fluconazole including but not limited to headache, diarrhea, nausea, upset stomach, liver function test abnormalities, taste disturbance, and stomach pain.  There is a rare possibility of liver failure that can occur when taking fluconazole.  The patient understands that monitoring of LFTs and kidney function test may be required, especially at baseline. The patient verbalized understanding of the proper use and possible adverse effects of fluconazole.  All of the patient's questions and concerns were addressed. Cantharidin Pregnancy And Lactation Text: This medication has not been proven safe during pregnancy. It is unknown if this medication is excreted in breast milk. Aklief Pregnancy And Lactation Text: It is unknown if this medication is safe to use during pregnancy.  It is unknown if this medication is excreted in breast milk.  Breastfeeding women should use the topical cream on the smallest area of the skin for the shortest time needed while breastfeeding.  Do not apply to nipple and areola. Bexarotene Pregnancy And Lactation Text: This medication is Pregnancy Category X and should not be given to women who are pregnant or may become pregnant. This medication should not be used if you are breast feeding. Opzelura Counseling:  I discussed with the patient the risks of Opzelura including but not limited to nasopharngitis, bronchitis, ear infection, eosinophila, hives, diarrhea, folliculitis, tonsillitis, and rhinorrhea.  Taken orally, this medication has been linked to serious infections; higher rate of mortality; malignancy and lymphoproliferative disorders; major adverse cardiovascular events; thrombosis; thrombocytopenia, anemia, and neutropenia; and lipid elevations. Azathioprine Counseling:  I discussed with the patient the risks of azathioprine including but not limited to myelosuppression, immunosuppression, hepatotoxicity, lymphoma, and infections.  The patient understands that monitoring is required including baseline LFTs, Creatinine, possible TPMP genotyping and weekly CBCs for the first month and then every 2 weeks thereafter.  The patient verbalized understanding of the proper use and possible adverse effects of azathioprine.  All of the patient's questions and concerns were addressed. Rinvoq Counseling: I discussed with the patient the risks of Rinvoq therapy including but not limited to upper respiratory tract infections, shingles, cold sores, bronchitis, nausea, cough, fever, acne, and headache. Live vaccines should be avoided.  This medication has been linked to serious infections; higher rate of mortality; malignancy and lymphoproliferative disorders; major adverse cardiovascular events; thrombosis; thrombocytopenia, anemia, and neutropenia; lipid elevations; liver enzyme elevations; and gastrointestinal perforations. Terbinafine Pregnancy And Lactation Text: This medication is Pregnancy Category B and is considered safe during pregnancy. It is also excreted in breast milk and breast feeding isn't recommended. Dutasteride Female Counseling: Dutasteride Counseling:  I discussed with the patient the risks of use of dutasteride including but not limited to decreased libido and sexual dysfunction. Explained the teratogenic nature of the medication and stressed the importance of not getting pregnant during treatment. All of the patient's questions and concerns were addressed. Gabapentin Counseling: I discussed with the patient the risks of gabapentin including but not limited to dizziness, somnolence, fatigue and ataxia. Valtrex Counseling: I discussed with the patient the risks of valacyclovir including but not limited to kidney damage, nausea, vomiting and severe allergy.  The patient understands that if the infection seems to be worsening or is not improving, they are to call. 5-Fu Counseling: 5-Fluorouracil Counseling:  I discussed with the patient the risks of 5-fluorouracil including but not limited to erythema, scaling, itching, weeping, crusting, and pain. Wartpeel Pregnancy And Lactation Text: This medication is Pregnancy Category X and contraindicated in pregnancy and in women who may become pregnant. It is unknown if this medication is excreted in breast milk. Valtrex Pregnancy And Lactation Text: this medication is Pregnancy Category B and is considered safe during pregnancy. This medication is not directly found in breast milk but it's metabolite acyclovir is present. Gabapentin Pregnancy And Lactation Text: This medication is Pregnancy Category C and isn't considered safe during pregnancy. It is excreted in breast milk. Prednisone Counseling:  I discussed with the patient the risks of prolonged use of prednisone including but not limited to weight gain, insomnia, osteoporosis, mood changes, diabetes, susceptibility to infection, glaucoma and high blood pressure.  In cases where prednisone use is prolonged, patients should be monitored with blood pressure checks, serum glucose levels and an eye exam.  Additionally, the patient may need to be placed on GI prophylaxis, PCP prophylaxis, and calcium and vitamin D supplementation and/or a bisphosphonate.  The patient verbalized understanding of the proper use and the possible adverse effects of prednisone.  All of the patient's questions and concerns were addressed. Solaraze Counseling:  I discussed with the patient the risks of Solaraze including but not limited to erythema, scaling, itching, weeping, crusting, and pain. Ivermectin Counseling:  Patient instructed to take medication on an empty stomach with a full glass of water.  Patient informed of potential adverse effects including but not limited to nausea, diarrhea, dizziness, itching, and swelling of the extremities or lymph nodes.  The patient verbalized understanding of the proper use and possible adverse effects of ivermectin.  All of the patient's questions and concerns were addressed. Winlevi Pregnancy And Lactation Text: This medication is considered safe during pregnancy and breastfeeding. Spevigo Counseling: I discussed with the patient the risks of Spevigo including but not limited to fatigue, nasuea, vomiting, headache, pruritus, urinary tract infection, an infusion related reactions.  The patient understands that monitoring is required including screening for tuberculosis at baseline and yearly screening thereafter while continuing Spevigo therapy. They should contact us if symptoms of infection or other concerning signs are noted. Nsaids Counseling: NSAID Counseling: I discussed with the patient that NSAIDs should be taken with food. Prolonged use of NSAIDs can result in the development of stomach ulcers.  Patient advised to stop taking NSAIDs if abdominal pain occurs.  The patient verbalized understanding of the proper use and possible adverse effects of NSAIDs.  All of the patient's questions and concerns were addressed. Clindamycin Counseling: I counseled the patient regarding use of clindamycin as an antibiotic for prophylactic and/or therapeutic purposes. Clindamycin is active against numerous classes of bacteria, including skin bacteria. Side effects may include nausea, diarrhea, gastrointestinal upset, rash, hives, yeast infections, and in rare cases, colitis. Topical Ketoconazole Counseling: Patient counseled that this medication may cause skin irritation or allergic reactions.  In the event of skin irritation, the patient was advised to reduce the amount of the drug applied or use it less frequently.   The patient verbalized understanding of the proper use and possible adverse effects of ketoconazole.  All of the patient's questions and concerns were addressed. Imiquimod Counseling:  I discussed with the patient the risks of imiquimod including but not limited to erythema, scaling, itching, weeping, crusting, and pain.  Patient understands that the inflammatory response to imiquimod is variable from person to person and was educated regarded proper titration schedule.  If flu-like symptoms develop, patient knows to discontinue the medication and contact us. Clindamycin Pregnancy And Lactation Text: This medication can be used in pregnancy if certain situations. Clindamycin is also present in breast milk. Cimetidine Counseling:  I discussed with the patient the risks of Cimetidine including but not limited to gynecomastia, headache, diarrhea, nausea, drowsiness, arrhythmias, pancreatitis, skin rashes, psychosis, bone marrow suppression and kidney toxicity. Azithromycin Counseling:  I discussed with the patient the risks of azithromycin including but not limited to GI upset, allergic reaction, drug rash, diarrhea, and yeast infections. Propranolol Counseling:  I discussed with the patient the risks of propranolol including but not limited to low heart rate, low blood pressure, low blood sugar, restlessness and increased cold sensitivity. They should call the office if they experience any of these side effects. Fluconazole Pregnancy And Lactation Text: This medication is Pregnancy Category C and it isn't know if it is safe during pregnancy. It is also excreted in breast milk. Azelaic Acid Counseling: Patient counseled that medicine may cause skin irritation and to avoid applying near the eyes.  In the event of skin irritation, the patient was advised to reduce the amount of the drug applied or use it less frequently.   The patient verbalized understanding of the proper use and possible adverse effects of azelaic acid.  All of the patient's questions and concerns were addressed. Arava Counseling:  Patient counseled regarding adverse effects of Arava including but not limited to nausea, vomiting, abnormalities in liver function tests. Patients may develop mouth sores, rash, diarrhea, and abnormalities in blood counts. The patient understands that monitoring is required including LFTs and blood counts.  There is a rare possibility of scarring of the liver and lung problems that can occur when taking methotrexate. Persistent nausea, loss of appetite, pale stools, dark urine, cough, and shortness of breath should be reported immediately. Patient advised to discontinue Arava treatment and consult with a physician prior to attempting conception. The patient will have to undergo a treatment to eliminate Arava from the body prior to conception. Quinolones Counseling:  I discussed with the patient the risks of fluoroquinolones including but not limited to GI upset, allergic reaction, drug rash, diarrhea, dizziness, photosensitivity, yeast infections, liver function test abnormalities, tendonitis/tendon rupture. Dutasteride Pregnancy And Lactation Text: This medication is absolutely contraindicated in women, especially during pregnancy and breast feeding. Feminization of male fetuses is possible if taking while pregnant. Isotretinoin Counseling: Patient should get monthly blood tests, not donate blood, not drive at night if vision affected, not share medication, and not undergo elective surgery for 6 months after tx completed. Side effects reviewed, pt to contact office should one occur. Opzelura Pregnancy And Lactation Text: There is insufficient data to evaluate drug-associated risk for major birth defects, miscarriage, or other adverse maternal or fetal outcomes.  There is a pregnancy registry that monitors pregnancy outcomes in pregnant persons exposed to the medication during pregnancy.  It is unknown if this medication is excreted in breast milk.  Do not breastfeed during treatment and for about 4 weeks after the last dose. Azathioprine Pregnancy And Lactation Text: This medication is Pregnancy Category D and isn't considered safe during pregnancy. It is unknown if this medication is excreted in breast milk. Rinvoq Pregnancy And Lactation Text: Based on animal studies, Rinvoq may cause embryo-fetal harm when administered to pregnant women.  The medication should not be used in pregnancy.  Breastfeeding is not recommended during treatment and for 6 days after the last dose. Cellcept Counseling:  I discussed with the patient the risks of mycophenolate mofetil including but not limited to infection/immunosuppression, GI upset, hypokalemia, hypercholesterolemia, bone marrow suppression, lymphoproliferative disorders, malignancy, GI ulceration/bleed/perforation, colitis, interstitial lung disease, kidney failure, progressive multifocal leukoencephalopathy, and birth defects.  The patient understands that monitoring is required including a baseline creatinine and regular CBC testing. In addition, patient must alert us immediately if symptoms of infection or other concerning signs are noted. Finasteride Male Counseling: Finasteride Counseling:  I discussed with the patient the risks of use of finasteride including but not limited to decreased libido, decreased ejaculate volume, gynecomastia, and depression. Women should not handle medication.  All of the patient's questions and concerns were addressed. Azelaic Acid Pregnancy And Lactation Text: This medication is considered safe during pregnancy and breast feeding. Picato Counseling:  I discussed with the patient the risks of Picato including but not limited to erythema, scaling, itching, weeping, crusting, and pain. Sotyktu Counseling:  I discussed the most common side effects of Sotyktu including: common cold, sore throat, sinus infections, cold sores, canker sores, folliculitis, and acne.  I also discussed more serious side effects of Sotyktu including but not limited to: serious allergic reactions; increased risk for infections such as TB; cancers such as lymphomas; rhabdomyolysis and elevated CPK; and elevated triglycerides and liver enzymes.  Prednisone Pregnancy And Lactation Text: This medication is Pregnancy Category C and it isn't know if it is safe during pregnancy. This medication is excreted in breast milk. Erivedge Counseling- I discussed with the patient the risks of Erivedge including but not limited to nausea, vomiting, diarrhea, constipation, weight loss, changes in the sense of taste, decreased appetite, muscle spasms, and hair loss.  The patient verbalized understanding of the proper use and possible adverse effects of Erivedge.  All of the patient's questions and concerns were addressed. Infliximab Counseling:  I discussed with the patient the risks of infliximab including but not limited to myelosuppression, immunosuppression, autoimmune hepatitis, demyelinating diseases, lymphoma, and serious infections.  The patient understands that monitoring is required including a PPD at baseline and must alert us or the primary physician if symptoms of infection or other concerning signs are noted. Glycopyrrolate Counseling:  I discussed with the patient the risks of glycopyrrolate including but not limited to skin rash, drowsiness, dry mouth, difficulty urinating, and blurred vision. Nsaids Pregnancy And Lactation Text: These medications are considered safe up to 30 weeks gestation. It is excreted in breast milk. Solaraze Pregnancy And Lactation Text: This medication is Pregnancy Category B and is considered safe. There is some data to suggest avoiding during the third trimester. It is unknown if this medication is excreted in breast milk. Dupixent Counseling: I discussed with the patient the risks of dupilumab including but not limited to eye infection and irritation, cold sores, injection site reactions, worsening of asthma, allergic reactions and increased risk of parasitic infection.  Live vaccines should be avoided while taking dupilumab. Dupilumab will also interact with certain medications such as warfarin and cyclosporine. The patient understands that monitoring is required and they must alert us or the primary physician if symptoms of infection or other concerning signs are noted. VTAMA Counseling: I discussed with the patient that VTAMA is not for use in the eyes, mouth or mouth. They should call the office if they develop any signs of allergic reactions to VTAMA. The patient verbalized understanding of the proper use and possible adverse effects of VTAMA.  All of the patient's questions and concerns were addressed. Spevigo Pregnancy And Lactation Text: The risk during pregnancy and breastfeeding is uncertain with this medication. This medication does cross the placenta. It is unknown if this medication is found in breast milk. Vtama Pregnancy And Lactation Text: It is unknown if this medication can cause problems during pregnancy and breastfeeding. Stelara Counseling:  I discussed with the patient the risks of ustekinumab including but not limited to immunosuppression, malignancy, posterior leukoencephalopathy syndrome, and serious infections.  The patient understands that monitoring is required including a PPD at baseline and must alert us or the primary physician if symptoms of infection or other concerning signs are noted. Dupixent Pregnancy And Lactation Text: This medication likely crosses the placenta but the risk for the fetus is uncertain. This medication is excreted in breast milk. Doxycycline Counseling:  Patient counseled regarding possible photosensitivity and increased risk for sunburn.  Patient instructed to avoid sunlight, if possible.  When exposed to sunlight, patients should wear protective clothing, sunglasses, and sunscreen.  The patient was instructed to call the office immediately if the following severe adverse effects occur:  hearing changes, easy bruising/bleeding, severe headache, or vision changes.  The patient verbalized understanding of the proper use and possible adverse effects of doxycycline.  All of the patient's questions and concerns were addressed. Olanzapine Counseling- I discussed with the patient the common side effects of olanzapine including but are not limited to: lack of energy, dry mouth, increased appetite, sleepiness, tremor, constipation, dizziness, changes in behavior, or restlessness.  Explained that teenagers are more likely to experience headaches, abdominal pain, pain in the arms or legs, tiredness, and sleepiness.  Serious side effects include but are not limited: increased risk of death in elderly patients who are confused, have memory loss, or dementia-related psychosis; hyperglycemia; increased cholesterol and triglycerides; and weight gain. Azithromycin Pregnancy And Lactation Text: This medication is considered safe during pregnancy and is also secreted in breast milk. Imiquimod Pregnancy And Lactation Text: This medication is Pregnancy Category C. It is unknown if this medication is excreted in breast milk. Griseofulvin Counseling:  I discussed with the patient the risks of griseofulvin including but not limited to photosensitivity, cytopenia, liver damage, nausea/vomiting and severe allergy.  The patient understands that this medication is best absorbed when taken with a fatty meal (e.g., ice cream or french fries). Arava Pregnancy And Lactation Text: This medication is Pregnancy Category X and is absolutely contraindicated during pregnancy. It is unknown if it is excreted in breast milk. Winlevi Counseling:  I discussed with the patient the risks of topical clascoterone including but not limited to erythema, scaling, itching, and stinging. Patient voiced their understanding. Propranolol Pregnancy And Lactation Text: This medication is Pregnancy Category C and it isn't known if it is safe during pregnancy. It is excreted in breast milk. Rifampin Counseling: I discussed with the patient the risks of rifampin including but not limited to liver damage, kidney damage, red-orange body fluids, nausea/vomiting and severe allergy. Clofazimine Counseling:  I discussed with the patient the risks of clofazimine including but not limited to skin and eye pigmentation, liver damage, nausea/vomiting, gastrointestinal bleeding and allergy. Topical Metronidazole Counseling: Metronidazole is a topical antibiotic medication. You may experience burning, stinging, redness, or allergic reactions.  Please call our office if you develop any problems from using this medication. SSKI Counseling:  I discussed with the patient the risks of SSKI including but not limited to thyroid abnormalities, metallic taste, GI upset, fever, headache, acne, arthralgias, paraesthesias, lymphadenopathy, easy bleeding, arrhythmias, and allergic reaction. Adbry Counseling: I discussed with the patient the risks of tralokinumab including but not limited to eye infection and irritation, cold sores, injection site reactions, worsening of asthma, allergic reactions and increased risk of parasitic infection.  Live vaccines should be avoided while taking tralokinumab. The patient understands that monitoring is required and they must alert us or the primary physician if symptoms of infection or other concerning signs are noted. Glycopyrrolate Pregnancy And Lactation Text: This medication is Pregnancy Category B and is considered safe during pregnancy. It is unknown if it is excreted breast milk. Finasteride Female Counseling: Finasteride Counseling:  I discussed with the patient the risks of use of finasteride including but not limited to decreased libido and sexual dysfunction. Explained the teratogenic nature of the medication and stressed the importance of not getting pregnant during treatment. All of the patient's questions and concerns were addressed. Sotyktu Pregnancy And Lactation Text: There is insufficient data to evaluate whether or not Sotyktu is safe to use during pregnancy.   It is not known if Sotyktu passes into breast milk and whether or not it is safe to use when breastfeeding.   Drysol Counseling:  I discussed with the patient the risks of drysol/aluminum chloride including but not limited to skin rash, itching, irritation, burning. Soolantra Counseling: I discussed with the patients the risks of topial Soolantra. This is a medicine which decreases the number of mites and inflammation in the skin. You experience burning, stinging, eye irritation or allergic reactions.  Please call our office if you develop any problems from using this medication. Soolantra Pregnancy And Lactation Text: This medication is Pregnancy Category C. This medication is considered safe during breast feeding. Enbrel Counseling:  I discussed with the patient the risks of etanercept including but not limited to myelosuppression, immunosuppression, autoimmune hepatitis, demyelinating diseases, lymphoma, and infections.  The patient understands that monitoring is required including a PPD at baseline and must alert us or the primary physician if symptoms of infection or other concerning signs are noted. Libtayo Counseling- I discussed with the patient the risks of Libtayo including but not limited to nausea, vomiting, diarrhea, and bone or muscle pain.  The patient verbalized understanding of the proper use and possible adverse effects of Libtayo.  All of the patient's questions and concerns were addressed. Olanzapine Pregnancy And Lactation Text: This medication is pregnancy category C.   There are no adequate and well controlled trials with olanzapine in pregnant females.  Olanzapine should be used during pregnancy only if the potential benefit justifies the potential risk to the fetus.   In a study in lactating healthy women, olanzapine was excreted in breast milk.  It is recommended that women taking olanzapine should not breast feed. Doxycycline Pregnancy And Lactation Text: This medication is Pregnancy Category D and not consider safe during pregnancy. It is also excreted in breast milk but is considered safe for shorter treatment courses. Zoryve Counseling:  I discussed with the patient that Zoryve is not for use in the eyes, mouth or vagina. The most commonly reported side effects include diarrhea, headache, insomnia, application site pain, upper respiratory tract infections, and urinary tract infections.  All of the patient's questions and concerns were addressed. Klisyri Counseling:  I discussed with the patient the risks of Klisyri including but not limited to erythema, scaling, itching, weeping, crusting, and pain. Bactrim Counseling:  I discussed with the patient the risks of sulfa antibiotics including but not limited to GI upset, allergic reaction, drug rash, diarrhea, dizziness, photosensitivity, and yeast infections.  Rarely, more serious reactions can occur including but not limited to aplastic anemia, agranulocytosis, methemoglobinemia, blood dyscrasias, liver or kidney failure, lung infiltrates or desquamative/blistering drug rashes. Doxepin Counseling:  Patient advised that the medication is sedating and not to drive a car after taking this medication. Patient informed of potential adverse effects including but not limited to dry mouth, urinary retention, and blurry vision.  The patient verbalized understanding of the proper use and possible adverse effects of doxepin.  All of the patient's questions and concerns were addressed. Griseofulvin Pregnancy And Lactation Text: This medication is Pregnancy Category X and is known to cause serious birth defects. It is unknown if this medication is excreted in breast milk but breast feeding should be avoided. Benzoyl Peroxide Counseling: Patient counseled that medicine may cause skin irritation and bleach clothing.  In the event of skin irritation, the patient was advised to reduce the amount of the drug applied or use it less frequently.   The patient verbalized understanding of the proper use and possible adverse effects of benzoyl peroxide.  All of the patient's questions and concerns were addressed. Cibinqo Counseling: I discussed with the patient the risks of Cibinqo therapy including but not limited to common cold, nausea, headache, cold sores, increased blood CPK levels, dizziness, UTIs, fatigue, acne, and vomitting. Live vaccines should be avoided.  This medication has been linked to serious infections; higher rate of mortality; malignancy and lymphoproliferative disorders; major adverse cardiovascular events; thrombosis; thrombocytopenia and lymphopenia; lipid elevations; and retinal detachment. Isotretinoin Pregnancy And Lactation Text: This medication is Pregnancy Category X and is considered extremely dangerous during pregnancy. It is unknown if it is excreted in breast milk. Doxepin Pregnancy And Lactation Text: This medication is Pregnancy Category C and it isn't known if it is safe during pregnancy. It is also excreted in breast milk and breast feeding isn't recommended. Klisyri Pregnancy And Lactation Text: It is unknown if this medication can harm a developing fetus or if it is excreted in breast milk. Itraconazole Counseling:  I discussed with the patient the risks of itraconazole including but not limited to liver damage, nausea/vomiting, neuropathy, and severe allergy.  The patient understands that this medication is best absorbed when taken with acidic beverages such as non-diet cola or ginger ale.  The patient understands that monitoring is required including baseline LFTs and repeat LFTs at intervals.  The patient understands that they are to contact us or the primary physician if concerning signs are noted. Rifampin Pregnancy And Lactation Text: This medication is Pregnancy Category C and it isn't know if it is safe during pregnancy. It is also excreted in breast milk and should not be used if you are breast feeding. Benzoyl Peroxide Pregnancy And Lactation Text: This medication is Pregnancy Category C. It is unknown if benzoyl peroxide is excreted in breast milk. Cibinqo Pregnancy And Lactation Text: It is unknown if this medication will adversely affect pregnancy or breast feeding.  You should not take this medication if you are currently pregnant or planning a pregnancy or while breastfeeding. Protopic Counseling: Patient may experience a mild burning sensation during topical application. Protopic is not approved in children less than 2 years of age. There have been case reports of hematologic and skin malignancies in patients using topical calcineurin inhibitors although causality is questionable. Cyclophosphamide Counseling:  I discussed with the patient the risks of cyclophosphamide including but not limited to hair loss, hormonal abnormalities, decreased fertility, abdominal pain, diarrhea, nausea and vomiting, bone marrow suppression and infection. The patient understands that monitoring is required while taking this medication. Sski Pregnancy And Lactation Text: This medication is Pregnancy Category D and isn't considered safe during pregnancy. It is excreted in breast milk. Hydroxychloroquine Counseling:  I discussed with the patient that a baseline ophthalmologic exam is needed at the start of therapy and every year thereafter while on therapy. A CBC may also be warranted for monitoring.  The side effects of this medication were discussed with the patient, including but not limited to agranulocytosis, aplastic anemia, seizures, rashes, retinopathy, and liver toxicity. Patient instructed to call the office should any adverse effect occur.  The patient verbalized understanding of the proper use and possible adverse effects of Plaquenil.  All the patient's questions and concerns were addressed. Finasteride Pregnancy And Lactation Text: This medication is absolutely contraindicated during pregnancy. It is unknown if it is excreted in breast milk. Adbry Pregnancy And Lactation Text: It is unknown if this medication will adversely affect pregnancy or breast feeding. Xeljanz Counseling: I discussed with the patient the risks of Xeljanz therapy including increased risk of infection, liver issues, headache, diarrhea, or cold symptoms. Live vaccines should be avoided. They were instructed to call if they have any problems. Siliq Counseling:  I discussed with the patient the risks of Siliq including but not limited to new or worsening depression, suicidal thoughts and behavior, immunosuppression, malignancy, posterior leukoencephalopathy syndrome, and serious infections.  The patient understands that monitoring is required including a PPD at baseline and must alert us or the primary physician if symptoms of infection or other concerning signs are noted. There is also a special program designed to monitor depression which is required with Siliq. Hydroxychloroquine Pregnancy And Lactation Text: This medication has been shown to cause fetal harm but it isn't assigned a Pregnancy Risk Category. There are small amounts excreted in breast milk. Libtayo Pregnancy And Lactation Text: This medication is contraindicated in pregnancy and when breast feeding. Xelronaz Pregnancy And Lactation Text: This medication is Pregnancy Category D and is not considered safe during pregnancy.  The risk during breast feeding is also uncertain. Birth Control Pills Counseling: Birth Control Pill Counseling: I discussed with the patient the potential side effects of OCPs including but not limited to increased risk of stroke, heart attack, thrombophlebitis, deep venous thrombosis, hepatic adenomas, breast changes, GI upset, headaches, and depression.  The patient verbalized understanding of the proper use and possible adverse effects of OCPs. All of the patient's questions and concerns were addressed. Elidel Counseling: Patient may experience a mild burning sensation during topical application. Elidel is not approved in children less than 2 years of age. There have been case reports of hematologic and skin malignancies in patients using topical calcineurin inhibitors although causality is questionable. Topical Retinoid counseling:  Patient advised to apply a pea-sized amount only at bedtime and wait 30 minutes after washing their face before applying.  If too drying, patient may add a non-comedogenic moisturizer. The patient verbalized understanding of the proper use and possible adverse effects of retinoids.  All of the patient's questions and concerns were addressed. Taltz Counseling: I discussed with the patient the risks of ixekizumab including but not limited to immunosuppression, serious infections, worsening of inflammatory bowel disease and drug reactions.  The patient understands that monitoring is required including a PPD at baseline and must alert us or the primary physician if symptoms of infection or other concerning signs are noted. Erythromycin Counseling:  I discussed with the patient the risks of erythromycin including but not limited to GI upset, allergic reaction, drug rash, diarrhea, increase in liver enzymes, and yeast infections. Bactrim Pregnancy And Lactation Text: This medication is Pregnancy Category D and is known to cause fetal risk.  It is also excreted in breast milk. Rituxan Counseling:  I discussed with the patient the risks of Rituxan infusions. Side effects can include infusion reactions, severe drug rashes including mucocutaneous reactions, reactivation of latent hepatitis and other infections and rarely progressive multifocal leukoencephalopathy.  All of the patient's questions and concerns were addressed. Topical Metronidazole Pregnancy And Lactation Text: This medication is Pregnancy Category B and considered safe during pregnancy.  It is also considered safe to use while breastfeeding. Oral Minoxidil Counseling- I discussed with the patient the risks of oral minoxidil including but not limited to shortness of breath, swelling of the feet or ankles, dizziness, lightheadedness, unwanted hair growth and allergic reaction.  The patient verbalized understanding of the proper use and possible adverse effects of oral minoxidil.  All of the patient's questions and concerns were addressed. Humira Counseling:  I discussed with the patient the risks of adalimumab including but not limited to myelosuppression, immunosuppression, autoimmune hepatitis, demyelinating diseases, lymphoma, and serious infections.  The patient understands that monitoring is required including a PPD at baseline and must alert us or the primary physician if symptoms of infection or other concerning signs are noted. High Dose Vitamin A Counseling: Side effects reviewed, pt to contact office should one occur. Oral Minoxidil Pregnancy And Lactation Text: This medication should only be used when clearly needed if you are pregnant, attempting to become pregnant or breast feeding. Minoxidil Counseling: Minoxidil is a topical medication which can increase blood flow where it is applied. It is uncertain how this medication increases hair growth. Side effects are uncommon and include stinging and allergic reactions. Hydroxyzine Counseling: Patient advised that the medication is sedating and not to drive a car after taking this medication.  Patient informed of potential adverse effects including but not limited to dry mouth, urinary retention, and blurry vision.  The patient verbalized understanding of the proper use and possible adverse effects of hydroxyzine.  All of the patient's questions and concerns were addressed. Litfulo Counseling: I discussed with the patient the risks of Litfulo therapy including but not limited to upper respiratory tract infections, shingles, cold sores, and nausea. Live vaccines should be avoided.  This medication has been linked to serious infections; higher rate of mortality; malignancy and lymphoproliferative disorders; major adverse cardiovascular events; thrombosis; gastrointestinal perforations; neutropenia; lymphopenia; anemia; liver enzyme elevations; and lipid elevations. Cyclophosphamide Pregnancy And Lactation Text: This medication is Pregnancy Category D and it isn't considered safe during pregnancy. This medication is excreted in breast milk. Carac Counseling:  I discussed with the patient the risks of Carac including but not limited to erythema, scaling, itching, weeping, crusting, and pain. Colchicine Counseling:  Patient counseled regarding adverse effects including but not limited to stomach upset (nausea, vomiting, stomach pain, or diarrhea).  Patient instructed to limit alcohol consumption while taking this medication.  Colchicine may reduce blood counts especially with prolonged use.  The patient understands that monitoring of kidney function and blood counts may be required, especially at baseline. The patient verbalized understanding of the proper use and possible adverse effects of colchicine.  All of the patient's questions and concerns were addressed. Protopic Pregnancy And Lactation Text: This medication is Pregnancy Category C. It is unknown if this medication is excreted in breast milk when applied topically. Sarecycline Counseling: Patient advised regarding possible photosensitivity and discoloration of the teeth, skin, lips, tongue and gums.  Patient instructed to avoid sunlight, if possible.  When exposed to sunlight, patients should wear protective clothing, sunglasses, and sunscreen.  The patient was instructed to call the office immediately if the following severe adverse effects occur:  hearing changes, easy bruising/bleeding, severe headache, or vision changes.  The patient verbalized understanding of the proper use and possible adverse effects of sarecycline.  All of the patient's questions and concerns were addressed. Bimzelx Counseling:  I discussed with the patient the risks of Bimzelx including but not limited to depression, immunosuppression, allergic reactions and infections.  The patient understands that monitoring is required including a PPD at baseline and must alert us or the primary physician if symptoms of infection or other concerning signs are noted. Thalidomide Counseling: I discussed with the patient the risks of thalidomide including but not limited to birth defects, anxiety, weakness, chest pain, dizziness, cough and severe allergy. Cyclosporine Counseling:  I discussed with the patient the risks of cyclosporine including but not limited to hypertension, gingival hyperplasia,myelosuppression, immunosuppression, liver damage, kidney damage, neurotoxicity, lymphoma, and serious infections. The patient understands that monitoring is required including baseline blood pressure, CBC, CMP, lipid panel and uric acid, and then 1-2 times monthly CMP and blood pressure. Simponi Counseling:  I discussed with the patient the risks of golimumab including but not limited to myelosuppression, immunosuppression, autoimmune hepatitis, demyelinating diseases, lymphoma, and serious infections.  The patient understands that monitoring is required including a PPD at baseline and must alert us or the primary physician if symptoms of infection or other concerning signs are noted. Qbrexza Counseling:  I discussed with the patient the risks of Qbrexza including but not limited to headache, mydriasis, blurred vision, dry eyes, nasal dryness, dry mouth, dry throat, dry skin, urinary hesitation, and constipation.  Local skin reactions including erythema, burning, stinging, and itching can also occur. Birth Control Pills Pregnancy And Lactation Text: This medication should be avoided if pregnant and for the first 30 days post-partum. Bimzelx Pregnancy And Lactation Text: This medication crosses the placenta and the safety is uncertain during pregnancy. It is unknown if this medication is present in breast milk. Rituxan Pregnancy And Lactation Text: This medication is Pregnancy Category C and it isn't know if it is safe during pregnancy. It is unknown if this medication is excreted in breast milk but similar antibodies are known to be excreted. Topical Steroids Counseling: I discussed with the patient that prolonged use of topical steroids can result in the increased appearance of superficial blood vessels (telangiectasias), lightening (hypopigmentation) and thinning of the skin (atrophy).  Patient understands to avoid using high potency steroids in skin folds, the groin or the face.  The patient verbalized understanding of the proper use and possible adverse effects of topical steroids.  All of the patient's questions and concerns were addressed. Odomzo Counseling- I discussed with the patient the risks of Odomzo including but not limited to nausea, vomiting, diarrhea, constipation, weight loss, changes in the sense of taste, decreased appetite, muscle spasms, and hair loss.  The patient verbalized understanding of the proper use and possible adverse effects of Odomzo.  All of the patient's questions and concerns were addressed. Low Dose Naltrexone Counseling- I discussed with the patient the potential risks and side effects of low dose naltrexone including but not limited to: more vivid dreams, headaches, nausea, vomiting, abdominal pain, fatigue, dizziness, and anxiety. Erythromycin Pregnancy And Lactation Text: This medication is Pregnancy Category B and is considered safe during pregnancy. It is also excreted in breast milk. Zyclara Counseling:  I discussed with the patient the risks of imiquimod including but not limited to erythema, scaling, itching, weeping, crusting, and pain.  Patient understands that the inflammatory response to imiquimod is variable from person to person and was educated regarded proper titration schedule.  If flu-like symptoms develop, patient knows to discontinue the medication and contact us. Tremfya Counseling: I discussed with the patient the risks of guselkumab including but not limited to immunosuppression, serious infections, worsening of inflammatory bowel disease and drug reactions.  The patient understands that monitoring is required including a PPD at baseline and must alert us or the primary physician if symptoms of infection or other concerning signs are noted. Eucrisa Counseling: Patient may experience a mild burning sensation during topical application. Eucrisa is not approved in children less than 2 years of age. Tazorac Counseling:  Patient advised that medication is irritating and drying.  Patient may need to apply sparingly and wash off after an hour before eventually leaving it on overnight.  The patient verbalized understanding of the proper use and possible adverse effects of tazorac.  All of the patient's questions and concerns were addressed. Detail Level: Simple High Dose Vitamin A Pregnancy And Lactation Text: High dose vitamin A therapy is contraindicated during pregnancy and breast feeding. Metronidazole Counseling:  I discussed with the patient the risks of metronidazole including but not limited to seizures, nausea/vomiting, a metallic taste in the mouth, nausea/vomiting and severe allergy. Acitretin Counseling:  I discussed with the patient the risks of acitretin including but not limited to hair loss, dry lips/skin/eyes, liver damage, hyperlipidemia, depression/suicidal ideation, photosensitivity.  Serious rare side effects can include but are not limited to pancreatitis, pseudotumor cerebri, bony changes, clot formation/stroke/heart attack.  Patient understands that alcohol is contraindicated since it can result in liver toxicity and significantly prolong the elimination of the drug by many years. Otezla Counseling: The side effects of Otezla were discussed with the patient, including but not limited to worsening or new depression, weight loss, diarrhea, nausea, upper respiratory tract infection, and headache. Patient instructed to call the office should any adverse effect occur.  The patient verbalized understanding of the proper use and possible adverse effects of Otezla.  All the patient's questions and concerns were addressed. Hydroxyzine Pregnancy And Lactation Text: This medication is not safe during pregnancy and should not be taken. It is also excreted in breast milk and breast feeding isn't recommended. Topical Sulfur Applications Counseling: Topical Sulfur Counseling: Patient counseled that this medication may cause skin irritation or allergic reactions.  In the event of skin irritation, the patient was advised to reduce the amount of the drug applied or use it less frequently.   The patient verbalized understanding of the proper use and possible adverse effects of topical sulfur application.  All of the patient's questions and concerns were addressed. Ketoconazole Counseling:   Patient counseled regarding improving absorption with orange juice.  Adverse effects include but are not limited to breast enlargement, headache, diarrhea, nausea, upset stomach, liver function test abnormalities, taste disturbance, and stomach pain.  There is a rare possibility of liver failure that can occur when taking ketoconazole. The patient understands that monitoring of LFTs may be required, especially at baseline. The patient verbalized understanding of the proper use and possible adverse effects of ketoconazole.  All of the patient's questions and concerns were addressed. Litfulo Pregnancy And Lactation Text: Based on animal studies, Lifulo may cause embryo-fetal harm when administered to pregnant women.  The medication should not be used in pregnancy.  Breastfeeding is not recommended during treatment. Dapsone Counseling: I discussed with the patient the risks of dapsone including but not limited to hemolytic anemia, agranulocytosis, rashes, methemoglobinemia, kidney failure, peripheral neuropathy, headaches, GI upset, and liver toxicity.  Patients who start dapsone require monitoring including baseline LFTs and weekly CBCs for the first month, then every month thereafter.  The patient verbalized understanding of the proper use and possible adverse effects of dapsone.  All of the patient's questions and concerns were addressed. Topical Steroids Applications Pregnancy And Lactation Text: Most topical steroids are considered safe to use during pregnancy and lactation.  Any topical steroid applied to the breast or nipple should be washed off before breastfeeding. Ketoconazole Pregnancy And Lactation Text: This medication is Pregnancy Category C and it isn't know if it is safe during pregnancy. It is also excreted in breast milk and breast feeding isn't recommended. Calcipotriene Counseling:  I discussed with the patient the risks of calcipotriene including but not limited to erythema, scaling, itching, and irritation. Olumiant Counseling: I discussed with the patient the risks of Olumiant therapy including but not limited to upper respiratory tract infections, shingles, cold sores, and nausea. Live vaccines should be avoided.  This medication has been linked to serious infections; higher rate of mortality; malignancy and lymphoproliferative disorders; major adverse cardiovascular events; thrombosis; gastrointestinal perforations; neutropenia; lymphopenia; anemia; liver enzyme elevations; and lipid elevations. Qbrexza Pregnancy And Lactation Text: There is no available data on Qbrexza use in pregnant women.  There is no available data on Qbrexza use in lactation. Tetracycline Counseling: Patient counseled regarding possible photosensitivity and increased risk for sunburn.  Patient instructed to avoid sunlight, if possible.  When exposed to sunlight, patients should wear protective clothing, sunglasses, and sunscreen.  The patient was instructed to call the office immediately if the following severe adverse effects occur:  hearing changes, easy bruising/bleeding, severe headache, or vision changes.  The patient verbalized understanding of the proper use and possible adverse effects of tetracycline.  All of the patient's questions and concerns were addressed. Patient understands to avoid pregnancy while on therapy due to potential birth defects. Cimzia Counseling:  I discussed with the patient the risks of Cimzia including but not limited to immunosuppression, allergic reactions and infections.  The patient understands that monitoring is required including a PPD at baseline and must alert us or the primary physician if symptoms of infection or other concerning signs are noted. Spironolactone Counseling: Patient advised regarding risks of diarrhea, abdominal pain, hyperkalemia, birth defects (for female patients), liver toxicity and renal toxicity. The patient may need blood work to monitor liver and kidney function and potassium levels while on therapy. The patient verbalized understanding of the proper use and possible adverse effects of spironolactone.  All of the patient's questions and concerns were addressed. Tranexamic Acid Counseling:  Patient advised of the small risk of bleeding problems with tranexamic acid. They were also instructed to call if they developed any nausea, vomiting or diarrhea. All of the patient's questions and concerns were addressed. Low Dose Naltrexone Pregnancy And Lactation Text: Naltrexone is pregnancy category C.  There have been no adequate and well-controlled studies in pregnant women.  It should be used in pregnancy only if the potential benefit justifies the potential risk to the fetus.   Limited data indicates that naltrexone is minimally excreted into breastmilk.

## 2025-05-29 ENCOUNTER — APPOINTMENT (OUTPATIENT)
Dept: PLASTIC SURGERY | Facility: CLINIC | Age: 55
End: 2025-05-29
Payer: MEDICAID

## 2025-05-29 ENCOUNTER — APPOINTMENT (OUTPATIENT)
Dept: DERMATOLOGY | Facility: CLINIC | Age: 55
End: 2025-05-29

## 2025-05-29 VITALS — BODY MASS INDEX: 31.58 KG/M2 | WEIGHT: 185 LBS | HEIGHT: 64 IN

## 2025-05-29 DIAGNOSIS — D17.21 BENIGN LIPOMATOUS NEOPLASM OF SKIN AND SUBCUTANEOUS TISSUE OF RIGHT ARM: ICD-10-CM

## 2025-05-29 DIAGNOSIS — L81.0 POSTINFLAMMATORY HYPERPIGMENTATION: ICD-10-CM

## 2025-05-29 DIAGNOSIS — L70.0 ACNE VULGARIS: ICD-10-CM

## 2025-05-29 PROCEDURE — 99204 OFFICE O/P NEW MOD 45 MIN: CPT

## 2025-05-29 PROCEDURE — 99203 OFFICE O/P NEW LOW 30 MIN: CPT

## 2025-06-13 ENCOUNTER — APPOINTMENT (OUTPATIENT)
Dept: MAMMOGRAPHY | Facility: CLINIC | Age: 55
End: 2025-06-13
Payer: MEDICAID

## 2025-06-13 ENCOUNTER — RESULT REVIEW (OUTPATIENT)
Age: 55
End: 2025-06-13

## 2025-06-13 ENCOUNTER — APPOINTMENT (OUTPATIENT)
Dept: ULTRASOUND IMAGING | Facility: CLINIC | Age: 55
End: 2025-06-13
Payer: MEDICAID

## 2025-06-13 PROCEDURE — 77066 DX MAMMO INCL CAD BI: CPT

## 2025-06-13 PROCEDURE — G0279: CPT

## 2025-06-27 ENCOUNTER — OUTPATIENT (OUTPATIENT)
Dept: OUTPATIENT SERVICES | Facility: HOSPITAL | Age: 55
LOS: 1 days | End: 2025-06-27
Payer: MEDICAID

## 2025-06-27 ENCOUNTER — APPOINTMENT (OUTPATIENT)
Dept: MRI IMAGING | Facility: IMAGING CENTER | Age: 55
End: 2025-06-27

## 2025-06-27 ENCOUNTER — APPOINTMENT (OUTPATIENT)
Dept: OTOLARYNGOLOGY | Facility: CLINIC | Age: 55
End: 2025-06-27
Payer: MEDICAID

## 2025-06-27 VITALS
BODY MASS INDEX: 30.73 KG/M2 | DIASTOLIC BLOOD PRESSURE: 85 MMHG | WEIGHT: 180 LBS | HEART RATE: 91 BPM | HEIGHT: 64 IN | SYSTOLIC BLOOD PRESSURE: 136 MMHG

## 2025-06-27 DIAGNOSIS — N64.4 MASTODYNIA: ICD-10-CM

## 2025-06-27 PROBLEM — H93.8X2 EAR FULLNESS, LEFT: Status: ACTIVE | Noted: 2025-06-27

## 2025-06-27 PROBLEM — Z86.59 HISTORY OF ANXIETY: Status: RESOLVED | Noted: 2025-06-27 | Resolved: 2025-06-27

## 2025-06-27 PROCEDURE — 92504 EAR MICROSCOPY EXAMINATION: CPT

## 2025-06-27 PROCEDURE — C8908: CPT

## 2025-06-27 PROCEDURE — A9585: CPT

## 2025-06-27 PROCEDURE — 99213 OFFICE O/P EST LOW 20 MIN: CPT | Mod: 25

## 2025-06-27 PROCEDURE — 77049 MRI BREAST C-+ W/CAD BI: CPT | Mod: 26

## 2025-06-27 PROCEDURE — C8937: CPT

## 2025-06-30 PROBLEM — R92.8 ABNORMAL MRI, BREAST: Status: ACTIVE | Noted: 2025-06-30

## 2025-07-09 ENCOUNTER — NON-APPOINTMENT (OUTPATIENT)
Age: 55
End: 2025-07-09

## 2025-07-09 ENCOUNTER — LABORATORY RESULT (OUTPATIENT)
Age: 55
End: 2025-07-09

## 2025-07-09 ENCOUNTER — APPOINTMENT (OUTPATIENT)
Dept: SURGICAL ONCOLOGY | Facility: CLINIC | Age: 55
End: 2025-07-09
Payer: MEDICAID

## 2025-07-09 VITALS
WEIGHT: 180 LBS | HEIGHT: 64 IN | OXYGEN SATURATION: 97 % | HEART RATE: 96 BPM | SYSTOLIC BLOOD PRESSURE: 130 MMHG | BODY MASS INDEX: 30.73 KG/M2 | DIASTOLIC BLOOD PRESSURE: 83 MMHG

## 2025-07-09 PROBLEM — R59.1 LYMPHADENOPATHY: Status: ACTIVE | Noted: 2025-07-09

## 2025-07-09 PROBLEM — R59.0 AXILLARY LYMPHADENOPATHY: Status: ACTIVE | Noted: 2025-07-09

## 2025-07-09 PROCEDURE — 99215 OFFICE O/P EST HI 40 MIN: CPT | Mod: 25

## 2025-07-09 PROCEDURE — 38505 NEEDLE BIOPSY LYMPH NODES: CPT | Mod: RT

## 2025-07-14 ENCOUNTER — RESULT REVIEW (OUTPATIENT)
Age: 55
End: 2025-07-14

## 2025-07-14 ENCOUNTER — APPOINTMENT (OUTPATIENT)
Dept: ULTRASOUND IMAGING | Facility: IMAGING CENTER | Age: 55
End: 2025-07-14
Payer: MEDICAID

## 2025-07-14 ENCOUNTER — OUTPATIENT (OUTPATIENT)
Dept: OUTPATIENT SERVICES | Facility: HOSPITAL | Age: 55
LOS: 1 days | End: 2025-07-14
Payer: MEDICAID

## 2025-07-14 DIAGNOSIS — R59.0 LOCALIZED ENLARGED LYMPH NODES: ICD-10-CM

## 2025-07-14 PROCEDURE — 19083 BX BREAST 1ST LESION US IMAG: CPT | Mod: LT,52

## 2025-07-14 PROCEDURE — 19083 BX BREAST 1ST LESION US IMAG: CPT

## 2025-07-25 ENCOUNTER — OUTPATIENT (OUTPATIENT)
Dept: OUTPATIENT SERVICES | Facility: HOSPITAL | Age: 55
LOS: 1 days | End: 2025-07-25
Payer: MEDICAID

## 2025-07-25 VITALS
RESPIRATION RATE: 18 BRPM | DIASTOLIC BLOOD PRESSURE: 87 MMHG | HEART RATE: 57 BPM | OXYGEN SATURATION: 99 % | TEMPERATURE: 98 F | HEIGHT: 63 IN | SYSTOLIC BLOOD PRESSURE: 127 MMHG | WEIGHT: 184.31 LBS

## 2025-07-25 DIAGNOSIS — R22.30 LOCALIZED SWELLING, MASS AND LUMP, UNSPECIFIED UPPER LIMB: ICD-10-CM

## 2025-07-25 DIAGNOSIS — K21.9 GASTRO-ESOPHAGEAL REFLUX DISEASE WITHOUT ESOPHAGITIS: ICD-10-CM

## 2025-07-25 DIAGNOSIS — R22.30 LOCALIZED SWELLING, MASS AND LUMP, UNSPECIFIED UPPER LIMB: Chronic | ICD-10-CM

## 2025-07-25 DIAGNOSIS — Z01.818 ENCOUNTER FOR OTHER PREPROCEDURAL EXAMINATION: ICD-10-CM

## 2025-07-25 DIAGNOSIS — Z98.890 OTHER SPECIFIED POSTPROCEDURAL STATES: Chronic | ICD-10-CM

## 2025-07-25 DIAGNOSIS — E11.9 TYPE 2 DIABETES MELLITUS WITHOUT COMPLICATIONS: ICD-10-CM

## 2025-07-25 DIAGNOSIS — I10 ESSENTIAL (PRIMARY) HYPERTENSION: ICD-10-CM

## 2025-07-25 PROCEDURE — G0463: CPT

## 2025-07-25 RX ORDER — SODIUM CHLORIDE 9 G/1000ML
1000 INJECTION, SOLUTION INTRAVENOUS
Refills: 0 | Status: DISCONTINUED | OUTPATIENT
Start: 2025-07-29 | End: 2025-08-13

## 2025-07-25 RX ORDER — DEXTROSE 50 % IN WATER 50 %
25 SYRINGE (ML) INTRAVENOUS ONCE
Refills: 0 | Status: DISCONTINUED | OUTPATIENT
Start: 2025-07-29 | End: 2025-08-13

## 2025-07-25 RX ORDER — SODIUM CHLORIDE 9 G/1000ML
1000 INJECTION, SOLUTION INTRAVENOUS
Refills: 0 | Status: DISCONTINUED | OUTPATIENT
Start: 2025-07-29 | End: 2025-07-30

## 2025-07-25 RX ORDER — GLUCAGON 3 MG/1
1 POWDER NASAL ONCE
Refills: 0 | Status: DISCONTINUED | OUTPATIENT
Start: 2025-07-29 | End: 2025-08-13

## 2025-07-28 RX ORDER — SUCRALFATE 1 G
1 TABLET ORAL
Refills: 0 | DISCHARGE

## 2025-07-28 RX ORDER — EMPAGLIFLOZIN 25 MG/1
1 TABLET, FILM COATED ORAL
Refills: 0 | DISCHARGE

## 2025-07-29 ENCOUNTER — TRANSCRIPTION ENCOUNTER (OUTPATIENT)
Age: 55
End: 2025-07-29

## 2025-07-29 ENCOUNTER — OUTPATIENT (OUTPATIENT)
Dept: OUTPATIENT SERVICES | Facility: HOSPITAL | Age: 55
LOS: 1 days | End: 2025-07-29
Payer: MEDICAID

## 2025-07-29 ENCOUNTER — APPOINTMENT (OUTPATIENT)
Dept: SURGICAL ONCOLOGY | Facility: HOSPITAL | Age: 55
End: 2025-07-29

## 2025-07-29 ENCOUNTER — RESULT REVIEW (OUTPATIENT)
Age: 55
End: 2025-07-29

## 2025-07-29 VITALS
HEIGHT: 63 IN | WEIGHT: 184.31 LBS | SYSTOLIC BLOOD PRESSURE: 110 MMHG | HEART RATE: 103 BPM | TEMPERATURE: 99 F | OXYGEN SATURATION: 97 % | DIASTOLIC BLOOD PRESSURE: 74 MMHG | RESPIRATION RATE: 18 BRPM

## 2025-07-29 DIAGNOSIS — R22.30 LOCALIZED SWELLING, MASS AND LUMP, UNSPECIFIED UPPER LIMB: ICD-10-CM

## 2025-07-29 DIAGNOSIS — R22.30 LOCALIZED SWELLING, MASS AND LUMP, UNSPECIFIED UPPER LIMB: Chronic | ICD-10-CM

## 2025-07-29 DIAGNOSIS — D17.9 BENIGN LIPOMATOUS NEOPLASM, UNSPECIFIED: ICD-10-CM

## 2025-07-29 DIAGNOSIS — Z98.890 OTHER SPECIFIED POSTPROCEDURAL STATES: Chronic | ICD-10-CM

## 2025-07-29 LAB
GLUCOSE BLDC GLUCOMTR-MCNC: 132 MG/DL — HIGH (ref 70–99)
GLUCOSE BLDC GLUCOMTR-MCNC: 157 MG/DL — HIGH (ref 70–99)
GLUCOSE BLDC GLUCOMTR-MCNC: 232 MG/DL — HIGH (ref 70–99)

## 2025-07-29 PROCEDURE — 23078 RAD RESCJ TUM SHOULDER 5CM/>: CPT | Mod: RT

## 2025-07-29 PROCEDURE — 88304 TISSUE EXAM BY PATHOLOGIST: CPT | Mod: 26

## 2025-07-29 PROCEDURE — 88291 CYTO/MOLECULAR REPORT: CPT

## 2025-07-29 PROCEDURE — 23078 RAD RESCJ TUM SHOULDER 5CM/>: CPT | Mod: AS,RT

## 2025-07-29 PROCEDURE — 82962 GLUCOSE BLOOD TEST: CPT

## 2025-07-29 DEVICE — AGENT HEMOSTATIC HEMOBLAST 1.65G 10CM: Type: IMPLANTABLE DEVICE | Site: RIGHT | Status: FUNCTIONAL

## 2025-07-29 DEVICE — SURGICEL FIBRILLAR 4 X 4": Type: IMPLANTABLE DEVICE | Site: RIGHT | Status: FUNCTIONAL

## 2025-07-29 RX ORDER — SITAGLIPTIN 100 MG/1
1 TABLET, FILM COATED ORAL
Refills: 0 | DISCHARGE

## 2025-07-29 RX ORDER — ONDANSETRON HCL/PF 4 MG/2 ML
4 VIAL (ML) INJECTION ONCE
Refills: 0 | Status: DISCONTINUED | OUTPATIENT
Start: 2025-07-29 | End: 2025-07-29

## 2025-07-29 RX ORDER — OXYCODONE HYDROCHLORIDE 30 MG/1
10 TABLET ORAL EVERY 6 HOURS
Refills: 0 | Status: DISCONTINUED | OUTPATIENT
Start: 2025-07-29 | End: 2025-07-29

## 2025-07-29 RX ORDER — ONDANSETRON HCL/PF 4 MG/2 ML
4 VIAL (ML) INJECTION EVERY 6 HOURS
Refills: 0 | Status: DISCONTINUED | OUTPATIENT
Start: 2025-07-29 | End: 2025-08-13

## 2025-07-29 RX ORDER — CEFAZOLIN SODIUM IN 0.9 % NACL 3 G/100 ML
2000 INTRAVENOUS SOLUTION, PIGGYBACK (ML) INTRAVENOUS EVERY 8 HOURS
Refills: 0 | Status: COMPLETED | OUTPATIENT
Start: 2025-07-30 | End: 2025-07-30

## 2025-07-29 RX ORDER — LOSARTAN POTASSIUM 100 MG/1
1 TABLET, FILM COATED ORAL
Refills: 0 | DISCHARGE

## 2025-07-29 RX ORDER — ROSUVASTATIN CALCIUM 20 MG/1
1 TABLET, FILM COATED ORAL
Refills: 0 | DISCHARGE

## 2025-07-29 RX ORDER — GLIMEPIRIDE 4 MG/1
1 TABLET ORAL
Refills: 0 | DISCHARGE

## 2025-07-29 RX ORDER — ROSUVASTATIN CALCIUM 20 MG/1
40 TABLET, FILM COATED ORAL AT BEDTIME
Refills: 0 | Status: DISCONTINUED | OUTPATIENT
Start: 2025-07-29 | End: 2025-08-13

## 2025-07-29 RX ORDER — OXYCODONE 5 MG/1
5 TABLET ORAL
Qty: 10 | Refills: 0 | Status: ACTIVE | COMMUNITY
Start: 2025-07-29 | End: 1900-01-01

## 2025-07-29 RX ORDER — OMEPRAZOLE 20 MG/1
1 CAPSULE, DELAYED RELEASE ORAL
Refills: 0 | DISCHARGE

## 2025-07-29 RX ORDER — OXYCODONE HYDROCHLORIDE 30 MG/1
5 TABLET ORAL EVERY 4 HOURS
Refills: 0 | Status: DISCONTINUED | OUTPATIENT
Start: 2025-07-29 | End: 2025-07-30

## 2025-07-29 RX ORDER — SODIUM CHLORIDE 9 G/1000ML
1000 INJECTION, SOLUTION INTRAVENOUS
Refills: 0 | Status: DISCONTINUED | OUTPATIENT
Start: 2025-07-29 | End: 2025-07-29

## 2025-07-29 RX ORDER — ACETAMINOPHEN 500 MG/5ML
975 LIQUID (ML) ORAL EVERY 6 HOURS
Refills: 0 | Status: DISCONTINUED | OUTPATIENT
Start: 2025-07-29 | End: 2025-08-13

## 2025-07-29 RX ORDER — HYDROMORPHONE/SOD CHLOR,ISO/PF 2 MG/10 ML
1 SYRINGE (ML) INJECTION
Refills: 0 | Status: DISCONTINUED | OUTPATIENT
Start: 2025-07-29 | End: 2025-07-29

## 2025-07-29 RX ORDER — CEFADROXIL 500 MG/1
500 CAPSULE ORAL TWICE DAILY
Qty: 14 | Refills: 0 | Status: ACTIVE | COMMUNITY
Start: 2025-07-29 | End: 1900-01-01

## 2025-07-29 RX ORDER — HYDROMORPHONE/SOD CHLOR,ISO/PF 2 MG/10 ML
0.5 SYRINGE (ML) INJECTION
Refills: 0 | Status: DISCONTINUED | OUTPATIENT
Start: 2025-07-29 | End: 2025-07-29

## 2025-07-29 RX ORDER — LOSARTAN POTASSIUM 100 MG/1
25 TABLET, FILM COATED ORAL DAILY
Refills: 0 | Status: DISCONTINUED | OUTPATIENT
Start: 2025-07-29 | End: 2025-08-13

## 2025-07-29 RX ADMIN — OXYCODONE HYDROCHLORIDE 10 MILLIGRAM(S): 30 TABLET ORAL at 20:42

## 2025-07-29 RX ADMIN — Medication 1 LOZENGE: at 22:39

## 2025-07-29 RX ADMIN — SODIUM CHLORIDE 50 MILLILITER(S): 9 INJECTION, SOLUTION INTRAVENOUS at 20:42

## 2025-07-29 RX ADMIN — ROSUVASTATIN CALCIUM 40 MILLIGRAM(S): 20 TABLET, FILM COATED ORAL at 21:36

## 2025-07-29 RX ADMIN — OXYCODONE HYDROCHLORIDE 10 MILLIGRAM(S): 30 TABLET ORAL at 21:30

## 2025-07-30 ENCOUNTER — TRANSCRIPTION ENCOUNTER (OUTPATIENT)
Age: 55
End: 2025-07-30

## 2025-07-30 VITALS
DIASTOLIC BLOOD PRESSURE: 62 MMHG | HEART RATE: 90 BPM | SYSTOLIC BLOOD PRESSURE: 104 MMHG | RESPIRATION RATE: 18 BRPM | OXYGEN SATURATION: 96 % | TEMPERATURE: 99 F

## 2025-07-30 LAB
GLUCOSE BLDC GLUCOMTR-MCNC: 158 MG/DL — HIGH (ref 70–99)
GLUCOSE BLDC GLUCOMTR-MCNC: 180 MG/DL — HIGH (ref 70–99)
GLUCOSE BLDC GLUCOMTR-MCNC: 191 MG/DL — HIGH (ref 70–99)

## 2025-07-30 PROCEDURE — 82962 GLUCOSE BLOOD TEST: CPT

## 2025-07-30 PROCEDURE — C1889: CPT

## 2025-07-30 PROCEDURE — C9399: CPT

## 2025-07-30 PROCEDURE — 88304 TISSUE EXAM BY PATHOLOGIST: CPT

## 2025-07-30 PROCEDURE — 23071 EXC SHOULDER LES SC 3 CM/>: CPT | Mod: RT

## 2025-07-30 RX ORDER — LOSARTAN POTASSIUM 100 MG/1
1 TABLET, FILM COATED ORAL
Qty: 0 | Refills: 0 | DISCHARGE
Start: 2025-07-30

## 2025-07-30 RX ORDER — INSULIN LISPRO 100 U/ML
INJECTION, SOLUTION INTRAVENOUS; SUBCUTANEOUS AT BEDTIME
Refills: 0 | Status: DISCONTINUED | OUTPATIENT
Start: 2025-07-30 | End: 2025-08-13

## 2025-07-30 RX ORDER — INSULIN LISPRO 100 U/ML
INJECTION, SOLUTION INTRAVENOUS; SUBCUTANEOUS
Refills: 0 | Status: DISCONTINUED | OUTPATIENT
Start: 2025-07-30 | End: 2025-08-13

## 2025-07-30 RX ORDER — OXYCODONE HYDROCHLORIDE 30 MG/1
1 TABLET ORAL
Qty: 10 | Refills: 0
Start: 2025-07-30

## 2025-07-30 RX ADMIN — Medication 975 MILLIGRAM(S): at 13:09

## 2025-07-30 RX ADMIN — Medication 975 MILLIGRAM(S): at 01:10

## 2025-07-30 RX ADMIN — OXYCODONE HYDROCHLORIDE 5 MILLIGRAM(S): 30 TABLET ORAL at 09:00

## 2025-07-30 RX ADMIN — Medication 1 LOZENGE: at 17:03

## 2025-07-30 RX ADMIN — SODIUM CHLORIDE 50 MILLILITER(S): 9 INJECTION, SOLUTION INTRAVENOUS at 00:16

## 2025-07-30 RX ADMIN — INSULIN LISPRO 1: 100 INJECTION, SOLUTION INTRAVENOUS; SUBCUTANEOUS at 12:34

## 2025-07-30 RX ADMIN — Medication 975 MILLIGRAM(S): at 12:36

## 2025-07-30 RX ADMIN — LOSARTAN POTASSIUM 25 MILLIGRAM(S): 100 TABLET, FILM COATED ORAL at 06:01

## 2025-07-30 RX ADMIN — Medication 975 MILLIGRAM(S): at 00:16

## 2025-07-30 RX ADMIN — INSULIN LISPRO 1: 100 INJECTION, SOLUTION INTRAVENOUS; SUBCUTANEOUS at 08:25

## 2025-07-30 RX ADMIN — INSULIN LISPRO 1: 100 INJECTION, SOLUTION INTRAVENOUS; SUBCUTANEOUS at 17:05

## 2025-07-30 RX ADMIN — OXYCODONE HYDROCHLORIDE 5 MILLIGRAM(S): 30 TABLET ORAL at 08:25

## 2025-07-30 RX ADMIN — Medication 100 MILLIGRAM(S): at 00:16

## 2025-07-30 RX ADMIN — Medication 975 MILLIGRAM(S): at 06:01

## 2025-07-30 RX ADMIN — Medication 975 MILLIGRAM(S): at 18:07

## 2025-07-30 RX ADMIN — Medication 975 MILLIGRAM(S): at 17:03

## 2025-07-30 RX ADMIN — Medication 40 MILLIGRAM(S): at 06:01

## 2025-07-30 RX ADMIN — Medication 100 MILLIGRAM(S): at 08:25

## 2025-08-01 PROBLEM — K21.9 GASTRO-ESOPHAGEAL REFLUX DISEASE WITHOUT ESOPHAGITIS: Chronic | Status: ACTIVE | Noted: 2025-07-25

## 2025-08-03 ENCOUNTER — EMERGENCY (EMERGENCY)
Facility: HOSPITAL | Age: 55
LOS: 1 days | End: 2025-08-03
Attending: STUDENT IN AN ORGANIZED HEALTH CARE EDUCATION/TRAINING PROGRAM | Admitting: STUDENT IN AN ORGANIZED HEALTH CARE EDUCATION/TRAINING PROGRAM
Payer: MEDICAID

## 2025-08-03 VITALS
SYSTOLIC BLOOD PRESSURE: 104 MMHG | HEIGHT: 63 IN | RESPIRATION RATE: 18 BRPM | WEIGHT: 179.9 LBS | HEART RATE: 92 BPM | OXYGEN SATURATION: 99 % | TEMPERATURE: 98 F | DIASTOLIC BLOOD PRESSURE: 63 MMHG

## 2025-08-03 VITALS
DIASTOLIC BLOOD PRESSURE: 81 MMHG | HEART RATE: 80 BPM | RESPIRATION RATE: 18 BRPM | SYSTOLIC BLOOD PRESSURE: 131 MMHG | TEMPERATURE: 98 F | OXYGEN SATURATION: 98 %

## 2025-08-03 DIAGNOSIS — R22.30 LOCALIZED SWELLING, MASS AND LUMP, UNSPECIFIED UPPER LIMB: Chronic | ICD-10-CM

## 2025-08-03 DIAGNOSIS — Z98.890 OTHER SPECIFIED POSTPROCEDURAL STATES: Chronic | ICD-10-CM

## 2025-08-03 PROCEDURE — 99284 EMERGENCY DEPT VISIT MOD MDM: CPT

## 2025-08-03 RX ORDER — ACETAMINOPHEN 500 MG/5ML
975 LIQUID (ML) ORAL ONCE
Refills: 0 | Status: COMPLETED | OUTPATIENT
Start: 2025-08-03 | End: 2025-08-03

## 2025-08-03 RX ORDER — KETOROLAC TROMETHAMINE 30 MG/ML
30 INJECTION, SOLUTION INTRAMUSCULAR; INTRAVENOUS ONCE
Refills: 0 | Status: DISCONTINUED | OUTPATIENT
Start: 2025-08-03 | End: 2025-08-03

## 2025-08-03 RX ORDER — LIDOCAINE HYDROCHLORIDE 20 MG/ML
2 JELLY TOPICAL ONCE
Refills: 0 | Status: COMPLETED | OUTPATIENT
Start: 2025-08-03 | End: 2025-08-03

## 2025-08-03 RX ORDER — DIAZEPAM 5 MG/1
5 TABLET ORAL ONCE
Refills: 0 | Status: DISCONTINUED | OUTPATIENT
Start: 2025-08-03 | End: 2025-08-03

## 2025-08-03 RX ORDER — DIAZEPAM 5 MG/1
1 TABLET ORAL
Qty: 6 | Refills: 0
Start: 2025-08-03 | End: 2025-08-05

## 2025-08-03 RX ADMIN — DIAZEPAM 5 MILLIGRAM(S): 5 TABLET ORAL at 17:27

## 2025-08-03 RX ADMIN — KETOROLAC TROMETHAMINE 30 MILLIGRAM(S): 30 INJECTION, SOLUTION INTRAMUSCULAR; INTRAVENOUS at 17:27

## 2025-08-03 RX ADMIN — LIDOCAINE HYDROCHLORIDE 2 PATCH: 20 JELLY TOPICAL at 17:27

## 2025-08-03 RX ADMIN — Medication 975 MILLIGRAM(S): at 17:26

## 2025-08-04 ENCOUNTER — EMERGENCY (EMERGENCY)
Facility: HOSPITAL | Age: 55
LOS: 1 days | End: 2025-08-04
Attending: STUDENT IN AN ORGANIZED HEALTH CARE EDUCATION/TRAINING PROGRAM | Admitting: STUDENT IN AN ORGANIZED HEALTH CARE EDUCATION/TRAINING PROGRAM
Payer: MEDICAID

## 2025-08-04 VITALS
RESPIRATION RATE: 16 BRPM | WEIGHT: 179.9 LBS | HEART RATE: 95 BPM | TEMPERATURE: 98 F | DIASTOLIC BLOOD PRESSURE: 80 MMHG | OXYGEN SATURATION: 100 % | SYSTOLIC BLOOD PRESSURE: 119 MMHG | HEIGHT: 63 IN

## 2025-08-04 DIAGNOSIS — R22.30 LOCALIZED SWELLING, MASS AND LUMP, UNSPECIFIED UPPER LIMB: Chronic | ICD-10-CM

## 2025-08-04 DIAGNOSIS — Z98.890 OTHER SPECIFIED POSTPROCEDURAL STATES: Chronic | ICD-10-CM

## 2025-08-04 PROCEDURE — 99284 EMERGENCY DEPT VISIT MOD MDM: CPT | Mod: 25

## 2025-08-05 VITALS
TEMPERATURE: 98 F | HEART RATE: 77 BPM | SYSTOLIC BLOOD PRESSURE: 119 MMHG | DIASTOLIC BLOOD PRESSURE: 72 MMHG | RESPIRATION RATE: 18 BRPM | OXYGEN SATURATION: 97 %

## 2025-08-05 RX ORDER — HYDROMORPHONE/SOD CHLOR,ISO/PF 2 MG/10 ML
0.5 SYRINGE (ML) INJECTION
Qty: 4.5 | Refills: 0
Start: 2025-08-05 | End: 2025-08-07

## 2025-08-05 RX ORDER — KETOROLAC TROMETHAMINE 30 MG/ML
30 INJECTION, SOLUTION INTRAMUSCULAR; INTRAVENOUS ONCE
Refills: 0 | Status: DISCONTINUED | OUTPATIENT
Start: 2025-08-05 | End: 2025-08-05

## 2025-08-05 RX ORDER — HYDROMORPHONE/SOD CHLOR,ISO/PF 2 MG/10 ML
0.5 SYRINGE (ML) INJECTION ONCE
Refills: 0 | Status: DISCONTINUED | OUTPATIENT
Start: 2025-08-05 | End: 2025-08-05

## 2025-08-05 RX ORDER — CYCLOBENZAPRINE HYDROCHLORIDE 15 MG/1
5 CAPSULE, EXTENDED RELEASE ORAL ONCE
Refills: 0 | Status: COMPLETED | OUTPATIENT
Start: 2025-08-05 | End: 2025-08-05

## 2025-08-05 RX ADMIN — Medication 0.5 MILLIGRAM(S): at 02:21

## 2025-08-05 RX ADMIN — CYCLOBENZAPRINE HYDROCHLORIDE 5 MILLIGRAM(S): 15 CAPSULE, EXTENDED RELEASE ORAL at 05:10

## 2025-08-05 RX ADMIN — KETOROLAC TROMETHAMINE 30 MILLIGRAM(S): 30 INJECTION, SOLUTION INTRAMUSCULAR; INTRAVENOUS at 02:21

## 2025-08-07 LAB — SURGICAL PATHOLOGY STUDY: SIGNIFICANT CHANGE UP

## 2025-08-13 ENCOUNTER — APPOINTMENT (OUTPATIENT)
Dept: SURGICAL ONCOLOGY | Facility: CLINIC | Age: 55
End: 2025-08-13

## 2025-08-13 VITALS
HEIGHT: 64 IN | OXYGEN SATURATION: 99 % | BODY MASS INDEX: 30.73 KG/M2 | DIASTOLIC BLOOD PRESSURE: 70 MMHG | WEIGHT: 180 LBS | HEART RATE: 100 BPM | SYSTOLIC BLOOD PRESSURE: 100 MMHG

## 2025-08-14 ENCOUNTER — APPOINTMENT (OUTPATIENT)
Dept: OBGYN | Facility: HOSPITAL | Age: 55
End: 2025-08-14

## 2025-08-14 ENCOUNTER — OUTPATIENT (OUTPATIENT)
Dept: OUTPATIENT SERVICES | Facility: HOSPITAL | Age: 55
LOS: 1 days | End: 2025-08-14

## 2025-08-14 VITALS
BODY MASS INDEX: 31.58 KG/M2 | HEIGHT: 64 IN | HEART RATE: 96 BPM | DIASTOLIC BLOOD PRESSURE: 80 MMHG | SYSTOLIC BLOOD PRESSURE: 120 MMHG | TEMPERATURE: 97.9 F | WEIGHT: 185 LBS

## 2025-08-14 DIAGNOSIS — D21.9 BENIGN NEOPLASM OF CONNECTIVE AND OTHER SOFT TISSUE, UNSPECIFIED: ICD-10-CM

## 2025-08-14 DIAGNOSIS — R22.30 LOCALIZED SWELLING, MASS AND LUMP, UNSPECIFIED UPPER LIMB: Chronic | ICD-10-CM

## 2025-08-14 PROCEDURE — 99213 OFFICE O/P EST LOW 20 MIN: CPT | Mod: GE

## 2025-08-15 DIAGNOSIS — R10.2 PELVIC AND PERINEAL PAIN: ICD-10-CM

## 2025-08-15 DIAGNOSIS — N64.4 MASTODYNIA: ICD-10-CM

## 2025-08-15 LAB — CHROM ANALY INTERPHASE BLD FISH-IMP: SIGNIFICANT CHANGE UP

## 2025-08-20 ENCOUNTER — APPOINTMENT (OUTPATIENT)
Dept: SURGICAL ONCOLOGY | Facility: CLINIC | Age: 55
End: 2025-08-20

## 2025-08-20 VITALS
HEIGHT: 64 IN | DIASTOLIC BLOOD PRESSURE: 80 MMHG | OXYGEN SATURATION: 98 % | SYSTOLIC BLOOD PRESSURE: 120 MMHG | WEIGHT: 185 LBS | HEART RATE: 97 BPM | BODY MASS INDEX: 31.58 KG/M2

## 2025-08-20 DIAGNOSIS — D17.21 BENIGN LIPOMATOUS NEOPLASM OF SKIN AND SUBCUTANEOUS TISSUE OF RIGHT ARM: ICD-10-CM

## 2025-08-20 PROCEDURE — 99024 POSTOP FOLLOW-UP VISIT: CPT

## 2025-08-22 ENCOUNTER — APPOINTMENT (OUTPATIENT)
Dept: DERMATOLOGY | Facility: CLINIC | Age: 55
End: 2025-08-22

## 2025-09-03 ENCOUNTER — APPOINTMENT (OUTPATIENT)
Dept: SURGICAL ONCOLOGY | Facility: CLINIC | Age: 55
End: 2025-09-03

## 2025-09-03 VITALS
OXYGEN SATURATION: 98 % | DIASTOLIC BLOOD PRESSURE: 78 MMHG | HEART RATE: 88 BPM | BODY MASS INDEX: 31.58 KG/M2 | SYSTOLIC BLOOD PRESSURE: 115 MMHG | HEIGHT: 64 IN | WEIGHT: 185 LBS

## 2025-09-03 DIAGNOSIS — D17.21 BENIGN LIPOMATOUS NEOPLASM OF SKIN AND SUBCUTANEOUS TISSUE OF RIGHT ARM: ICD-10-CM

## 2025-09-03 PROCEDURE — 99024 POSTOP FOLLOW-UP VISIT: CPT

## 2025-09-05 ENCOUNTER — APPOINTMENT (OUTPATIENT)
Dept: ULTRASOUND IMAGING | Facility: IMAGING CENTER | Age: 55
End: 2025-09-05
Payer: MEDICAID

## 2025-09-05 ENCOUNTER — OUTPATIENT (OUTPATIENT)
Dept: OUTPATIENT SERVICES | Facility: HOSPITAL | Age: 55
LOS: 1 days | End: 2025-09-05
Payer: MEDICAID

## 2025-09-05 DIAGNOSIS — Z98.890 OTHER SPECIFIED POSTPROCEDURAL STATES: Chronic | ICD-10-CM

## 2025-09-05 DIAGNOSIS — D21.9 BENIGN NEOPLASM OF CONNECTIVE AND OTHER SOFT TISSUE, UNSPECIFIED: ICD-10-CM

## 2025-09-05 DIAGNOSIS — R22.30 LOCALIZED SWELLING, MASS AND LUMP, UNSPECIFIED UPPER LIMB: Chronic | ICD-10-CM

## 2025-09-05 PROCEDURE — 76830 TRANSVAGINAL US NON-OB: CPT | Mod: 26

## 2025-09-05 PROCEDURE — 76830 TRANSVAGINAL US NON-OB: CPT

## (undated) DEVICE — DRSG STERISTRIPS 0.5 X 4"

## (undated) DEVICE — DRAPE TOWEL BLUE 17" X 24"

## (undated) DEVICE — PREP CHLORAPREP HI-LITE ORANGE 26ML

## (undated) DEVICE — POSITIONER FOAM EGG CRATE ULNAR 2PCS (PINK)

## (undated) DEVICE — DRAPE 3/4 SHEET 52X76"

## (undated) DEVICE — SUT MONOCRYL 3-0 27" PS-2 UNDYED

## (undated) DEVICE — ELCTR GROUNDING PAD ADULT COVIDIEN

## (undated) DEVICE — DRAPE 1/2 SHEET 40X57"

## (undated) DEVICE — SUT VICRYL 2-0 27" CT-1

## (undated) DEVICE — ELCTR ROCKER SWITCH PENCIL BLUE 10FT

## (undated) DEVICE — WARMING BLANKET LOWER ADULT

## (undated) DEVICE — SUT POLYSORB 3-0 30" V-20 UNDYED

## (undated) DEVICE — SUT CHROMIC 3-0 27" SH

## (undated) DEVICE — DRAPE INSTRUMENT POUCH 6.75" X 11"

## (undated) DEVICE — DRSG TEGADERM 1.75X1.75"

## (undated) DEVICE — VENODYNE/SCD SLEEVE CALF MEDIUM

## (undated) DEVICE — DRSG CURITY GAUZE SPONGE 4 X 4" 12-PLY

## (undated) DEVICE — DRSG TEGADERM 6 X 8"

## (undated) DEVICE — DRSG OPSITE 13.75 X 4"

## (undated) DEVICE — DRSG STOCKINETTE IMPERVIOUS XL 12 X 48"

## (undated) DEVICE — SUT VICRYL 3-0 27" SH

## (undated) DEVICE — DRAPE SPLIT SHEET 77" X 108"

## (undated) DEVICE — DRSG COBAN 4"

## (undated) DEVICE — DRSG XEROFORM 5 X 9"

## (undated) DEVICE — GLV 7.5 PROTEXIS (BLUE)

## (undated) DEVICE — SOL IRR POUR H2O 500ML

## (undated) DEVICE — DRSG STOCKINETTE IMPERVIOUS MED 8 X 38"

## (undated) DEVICE — SOL IRR POUR NS 0.9% 500ML

## (undated) DEVICE — ELCTR BOVIE TIP BLADE INSULATED 4" EDGE

## (undated) DEVICE — DRSG TELFA 3 X 8

## (undated) DEVICE — DRSG ADAPTIC 3 X 8"

## (undated) DEVICE — SPECIMEN CONTAINER 100ML

## (undated) DEVICE — Device

## (undated) DEVICE — PACK MINOR NO DRAPE

## (undated) DEVICE — MARKING PEN W RULER

## (undated) DEVICE — DRSG KLING 4"

## (undated) DEVICE — DRSG COMBINE 5 X 9"

## (undated) DEVICE — SUT MONOCRYL 4-0 27" PS-2 UNDYED

## (undated) DEVICE — SUT SILK 2-0 18" FS

## (undated) DEVICE — BLADE SCALPEL SAFETYLOCK #15

## (undated) DEVICE — WARMING BLANKET UPPER ADULT

## (undated) DEVICE — STAPLER SKIN VISI-STAT 35 WIDE